# Patient Record
Sex: FEMALE | Race: WHITE | NOT HISPANIC OR LATINO | Employment: OTHER
[De-identification: names, ages, dates, MRNs, and addresses within clinical notes are randomized per-mention and may not be internally consistent; named-entity substitution may affect disease eponyms.]

---

## 2017-02-28 DIAGNOSIS — I10 ESSENTIAL HYPERTENSION WITH GOAL BLOOD PRESSURE LESS THAN 140/90: ICD-10-CM

## 2017-02-28 DIAGNOSIS — N18.30 CKD (CHRONIC KIDNEY DISEASE) STAGE 3, GFR 30-59 ML/MIN (H): ICD-10-CM

## 2017-03-01 RX ORDER — LISINOPRIL 10 MG/1
TABLET ORAL
Qty: 90 TABLET | Refills: 1 | OUTPATIENT
Start: 2017-03-01

## 2017-03-01 NOTE — TELEPHONE ENCOUNTER
PATIENT HAS REFILLS ON FILE - PLEASE CONTACT PHARMACY AND CANCEL REQUEST    lisinopril (PRINIVIL,ZESTRIL) 10 MG tablet      Last Written Prescription Date: 11/15/2016  Last Fill Quantity: 180, # refills: 3  Last Office Visit with G, P or [facility] prescribing provider: 11/15/2016       Potassium   Date Value Ref Range Status   11/15/2016 3.8 3.4 - 5.3 mmol/L Final     Creatinine   Date Value Ref Range Status   11/15/2016 1.14 (H) 0.52 - 1.04 mg/dL Final     BP Readings from Last 3 Encounters:   11/15/16 138/82   11/13/15 (!) 140/93   03/10/15 146/84     BRO ZURITA RN

## 2017-05-27 ENCOUNTER — HEALTH MAINTENANCE LETTER (OUTPATIENT)
Age: 57
End: 2017-05-27

## 2017-10-25 ENCOUNTER — RADIANT APPOINTMENT (OUTPATIENT)
Dept: MAMMOGRAPHY | Facility: CLINIC | Age: 57
End: 2017-10-25
Attending: FAMILY MEDICINE
Payer: COMMERCIAL

## 2017-10-25 DIAGNOSIS — Z12.31 VISIT FOR SCREENING MAMMOGRAM: ICD-10-CM

## 2017-10-25 PROCEDURE — G0202 SCR MAMMO BI INCL CAD: HCPCS

## 2017-11-17 ENCOUNTER — OFFICE VISIT (OUTPATIENT)
Dept: FAMILY MEDICINE | Facility: CLINIC | Age: 57
End: 2017-11-17
Payer: COMMERCIAL

## 2017-11-17 VITALS
RESPIRATION RATE: 12 BRPM | HEART RATE: 70 BPM | BODY MASS INDEX: 28.64 KG/M2 | DIASTOLIC BLOOD PRESSURE: 82 MMHG | TEMPERATURE: 99 F | HEIGHT: 64 IN | SYSTOLIC BLOOD PRESSURE: 136 MMHG | OXYGEN SATURATION: 98 % | WEIGHT: 167.75 LBS

## 2017-11-17 DIAGNOSIS — Z83.719 FAMILY HISTORY OF COLONIC POLYPS: ICD-10-CM

## 2017-11-17 DIAGNOSIS — Z71.89 COUNSELING REGARDING ADVANCED DIRECTIVES: ICD-10-CM

## 2017-11-17 DIAGNOSIS — D12.6 TUBULAR ADENOMA OF COLON: ICD-10-CM

## 2017-11-17 DIAGNOSIS — R73.01 IMPAIRED FASTING GLUCOSE: ICD-10-CM

## 2017-11-17 DIAGNOSIS — I10 ESSENTIAL HYPERTENSION WITH GOAL BLOOD PRESSURE LESS THAN 140/90: ICD-10-CM

## 2017-11-17 DIAGNOSIS — N20.0 CALCULUS OF KIDNEY: ICD-10-CM

## 2017-11-17 DIAGNOSIS — Z11.59 NEED FOR HEPATITIS C SCREENING TEST: ICD-10-CM

## 2017-11-17 DIAGNOSIS — N18.30 CKD (CHRONIC KIDNEY DISEASE) STAGE 3, GFR 30-59 ML/MIN (H): ICD-10-CM

## 2017-11-17 DIAGNOSIS — Z01.419 WELL WOMAN EXAM WITH ROUTINE GYNECOLOGICAL EXAM: Primary | ICD-10-CM

## 2017-11-17 DIAGNOSIS — F33.8 SEASONAL AFFECTIVE DISORDER (H): ICD-10-CM

## 2017-11-17 DIAGNOSIS — Z13.6 ENCOUNTER FOR SCREENING FOR CARDIOVASCULAR DISORDERS: ICD-10-CM

## 2017-11-17 LAB
BASOPHILS # BLD AUTO: 0 10E9/L (ref 0–0.2)
BASOPHILS NFR BLD AUTO: 0.5 %
DIFFERENTIAL METHOD BLD: NORMAL
EOSINOPHIL # BLD AUTO: 0.1 10E9/L (ref 0–0.7)
EOSINOPHIL NFR BLD AUTO: 1.4 %
ERYTHROCYTE [DISTWIDTH] IN BLOOD BY AUTOMATED COUNT: 12.5 % (ref 10–15)
HBA1C MFR BLD: 5.6 % (ref 4.3–6)
HCT VFR BLD AUTO: 43.7 % (ref 35–47)
HCV AB SERPL QL IA: NONREACTIVE
HGB BLD-MCNC: 15 G/DL (ref 11.7–15.7)
LYMPHOCYTES # BLD AUTO: 1.3 10E9/L (ref 0.8–5.3)
LYMPHOCYTES NFR BLD AUTO: 30.6 %
MCH RBC QN AUTO: 31.8 PG (ref 26.5–33)
MCHC RBC AUTO-ENTMCNC: 34.3 G/DL (ref 31.5–36.5)
MCV RBC AUTO: 93 FL (ref 78–100)
MONOCYTES # BLD AUTO: 0.3 10E9/L (ref 0–1.3)
MONOCYTES NFR BLD AUTO: 7.7 %
NEUTROPHILS # BLD AUTO: 2.6 10E9/L (ref 1.6–8.3)
NEUTROPHILS NFR BLD AUTO: 59.8 %
PLATELET # BLD AUTO: 227 10E9/L (ref 150–450)
RBC # BLD AUTO: 4.72 10E12/L (ref 3.8–5.2)
WBC # BLD AUTO: 4.3 10E9/L (ref 4–11)

## 2017-11-17 PROCEDURE — G0145 SCR C/V CYTO,THINLAYER,RESCR: HCPCS | Performed by: FAMILY MEDICINE

## 2017-11-17 PROCEDURE — 36415 COLL VENOUS BLD VENIPUNCTURE: CPT | Performed by: FAMILY MEDICINE

## 2017-11-17 PROCEDURE — 87624 HPV HI-RISK TYP POOLED RSLT: CPT | Performed by: FAMILY MEDICINE

## 2017-11-17 PROCEDURE — 80050 GENERAL HEALTH PANEL: CPT | Performed by: FAMILY MEDICINE

## 2017-11-17 PROCEDURE — 86803 HEPATITIS C AB TEST: CPT | Performed by: FAMILY MEDICINE

## 2017-11-17 PROCEDURE — 80061 LIPID PANEL: CPT | Performed by: FAMILY MEDICINE

## 2017-11-17 PROCEDURE — 83036 HEMOGLOBIN GLYCOSYLATED A1C: CPT | Performed by: FAMILY MEDICINE

## 2017-11-17 PROCEDURE — 82043 UR ALBUMIN QUANTITATIVE: CPT | Performed by: FAMILY MEDICINE

## 2017-11-17 PROCEDURE — 99396 PREV VISIT EST AGE 40-64: CPT | Performed by: FAMILY MEDICINE

## 2017-11-17 RX ORDER — LISINOPRIL 10 MG/1
10 TABLET ORAL 2 TIMES DAILY
Qty: 180 TABLET | Refills: 3 | Status: SHIPPED | OUTPATIENT
Start: 2017-11-17 | End: 2018-11-26

## 2017-11-17 NOTE — LETTER
November 20, 2017      Franchesca Hyatt  [address]  [facility] [address]        Dear ,    We are writing to inform you of your test results.    Results are as follows   -Liver tests (ALT,AST, Alk phos) are normal but gall bladder tests ( total bilirubin ) is  elevated. ADVISE: recheck bilirubin non fasting to assess this further. Can make a lab only apt for this. further recommendations will follow based on this result. I will leave the order in the computer   -Kidney function (GFR) is decreased.  ADVISE: stable to prior  -Sodium is normal.  -Potassium is normal.  -Glucose is slight elevated and may be sign of early diabetes (prediabetes). ADVISE:: low carbohydrate diet, exercise, try to lose weight (if necessary) and recheck glucose in 12 months. (GLU,A1C, DX: prediabetes)  -LDL(bad) cholesterol level is elevated,  A diet high in fat and simple carbohydrates, genetics and being overweight can contribute to this. ADVISE: Exercise, a low fat, low carbohydrate diet and weight control are helpful to improve this.  Rechecking your fasting cholesterol panel in 12 months is recommended (Lipid w/ LDL reflex, DX:hyperlipidemia)  The 10-year ASCVD risk score (Rush DC Jr, et al., 2013) is: 3.4%    Values used to calculate the score:      Age: 56 years      Sex: Female      Is Non- : No      Diabetic: No      Tobacco smoker: No      Systolic Blood Pressure: 136 mmHg      Is BP treated: Yes      HDL Cholesterol: 53 mg/dL      Total Cholesterol: 219 mg/dL  -TSH (thyroid stimulating hormone) level is normal which indicates normal thyroid function..    Resulted Orders   Albumin Random Urine Quantitative with Creat Ratio   Result Value Ref Range    Creatinine Urine 133 mg/dL    Albumin Urine mg/L 27 mg/L    Albumin Urine mg/g Cr 20.08 0 - 25 mg/g Cr   Hemoglobin A1c   Result Value Ref Range    Hemoglobin A1C 5.6 4.3 - 6.0 %   CBC with platelets differential   Result Value Ref Range    WBC  4.3 4.0 - 11.0 10e9/L    RBC Count 4.72 3.8 - 5.2 10e12/L    Hemoglobin 15.0 11.7 - 15.7 g/dL    Hematocrit 43.7 35.0 - 47.0 %    MCV 93 78 - 100 fl    MCH 31.8 26.5 - 33.0 pg    MCHC 34.3 31.5 - 36.5 g/dL    RDW 12.5 10.0 - 15.0 %    Platelet Count 227 150 - 450 10e9/L    Diff Method Automated Method     % Neutrophils 59.8 %    % Lymphocytes 30.6 %    % Monocytes 7.7 %    % Eosinophils 1.4 %    % Basophils 0.5 %    Absolute Neutrophil 2.6 1.6 - 8.3 10e9/L    Absolute Lymphocytes 1.3 0.8 - 5.3 10e9/L    Absolute Monocytes 0.3 0.0 - 1.3 10e9/L    Absolute Eosinophils 0.1 0.0 - 0.7 10e9/L    Absolute Basophils 0.0 0.0 - 0.2 10e9/L   Comprehensive metabolic panel   Result Value Ref Range    Sodium 139 133 - 144 mmol/L    Potassium 3.9 3.4 - 5.3 mmol/L    Chloride 106 94 - 109 mmol/L    Carbon Dioxide 22 20 - 32 mmol/L    Anion Gap 11 3 - 14 mmol/L    Glucose 106 (H) 70 - 99 mg/dL      Comment:      Fasting specimen    Urea Nitrogen 22 7 - 30 mg/dL    Creatinine 1.11 (H) 0.52 - 1.04 mg/dL    GFR Estimate 51 (L) >60 mL/min/1.7m2      Comment:      Non  GFR Calc    GFR Estimate If Black 62 >60 mL/min/1.7m2      Comment:       GFR Calc    Calcium 9.2 8.5 - 10.1 mg/dL    Bilirubin Total 1.9 (H) 0.2 - 1.3 mg/dL    Albumin 4.4 3.4 - 5.0 g/dL    Protein Total 7.6 6.8 - 8.8 g/dL    Alkaline Phosphatase 50 40 - 150 U/L    ALT 24 0 - 50 U/L    AST 16 0 - 45 U/L   Lipid panel reflex to direct LDL Fasting   Result Value Ref Range    Cholesterol 219 (H) <200 mg/dL      Comment:      Desirable:       <200 mg/dl    Triglycerides 84 <150 mg/dL      Comment:      Fasting specimen    HDL Cholesterol 53 >49 mg/dL    LDL Cholesterol Calculated 149 (H) <100 mg/dL      Comment:      Above desirable:  100-129 mg/dl  Borderline High:  130-159 mg/dL  High:             160-189 mg/dL  Very high:       >189 mg/dl      Non HDL Cholesterol 166 (H) <130 mg/dL      Comment:      Above Desirable:  130-159  mg/dl  Borderline high:  160-189 mg/dl  High:             190-219 mg/dl  Very high:       >219 mg/dl     TSH with free T4 reflex   Result Value Ref Range    TSH 1.91 0.40 - 4.00 mU/L   Hepatitis C Screen Reflex to HCV RNA Quant and Genotype   Result Value Ref Range    Hepatitis C Antibody Nonreactive NR^Nonreactive      Comment:      Assay performance characteristics have not been established for newborns,   infants, and children         If you have any questions or concerns, please call the clinic at the number listed above.       Sincerely,        Deidre Mcqueen MD/nr

## 2017-11-17 NOTE — LETTER
December 1, 2017    Franchesca Hyatt  [address]  [facility] [address]    Dear Franchesca,  We are happy to inform you that your PAP smear result from 11/17/17 is normal.  We are now able to do a follow up test on PAP smears. The DNA test is for HPV (Human Papilloma Virus). Cervical cancer is closely linked with certain types of HPV. Your result showed no evidence of high risk HPV.  Therefore we recommend you return in 5 years for your next pap smear and HPV test.  You will still need to return to the clinic every year for an annual exam and other preventive tests.  Please contact the clinic at [phone] with any questions.  Sincerely,    Deidre Mcqueen MD/zara

## 2017-11-17 NOTE — LETTER
November 17, 2017      Franchesca BOO Edmar  [address]  [facility] [address]        Dear ,    We are writing to inform you of your test results.    Results within acceptable limits.  -Normal red blood cell (hgb) levels, normal white blood cell count and normal platelet levels.  -A1C (diabetic test) is normal and indicates that your blood sugar has been in a normal range the last 3 months.  Rest of tests not back yet    Resulted Orders   Hemoglobin A1c   Result Value Ref Range    Hemoglobin A1C 5.6 4.3 - 6.0 %   CBC with platelets differential   Result Value Ref Range    WBC 4.3 4.0 - 11.0 10e9/L    RBC Count 4.72 3.8 - 5.2 10e12/L    Hemoglobin 15.0 11.7 - 15.7 g/dL    Hematocrit 43.7 35.0 - 47.0 %    MCV 93 78 - 100 fl    MCH 31.8 26.5 - 33.0 pg    MCHC 34.3 31.5 - 36.5 g/dL    RDW 12.5 10.0 - 15.0 %    Platelet Count 227 150 - 450 10e9/L    Diff Method Automated Method     % Neutrophils 59.8 %    % Lymphocytes 30.6 %    % Monocytes 7.7 %    % Eosinophils 1.4 %    % Basophils 0.5 %    Absolute Neutrophil 2.6 1.6 - 8.3 10e9/L    Absolute Lymphocytes 1.3 0.8 - 5.3 10e9/L    Absolute Monocytes 0.3 0.0 - 1.3 10e9/L    Absolute Eosinophils 0.1 0.0 - 0.7 10e9/L    Absolute Basophils 0.0 0.0 - 0.2 10e9/L       If you have any questions or concerns, please call the clinic at the number listed above.       Sincerely,        Deidre Mcqueen MD/nr

## 2017-11-17 NOTE — PROGRESS NOTES
Results within acceptable limits.  -Normal red blood cell (hgb) levels, normal white blood cell count and normal platelet levels.  -A1C (diabetic test) is normal and indicates that your blood sugar has been in a normal range the last 3 months.  Rest of tests not back yet

## 2017-11-17 NOTE — MR AVS SNAPSHOT
After Visit Summary   11/17/2017    Franchesca Hyatt    MRN: [id number]           Patient Information     Date Of Birth          1960        Visit Information        Provider Department      11/17/2017 8:20 AM Deidre Mcqueen MD [facility] [place]        Today's Diagnoses     Well woman exam with routine gynecological exam    -  1    Essential hypertension with goal blood pressure less than 140/90        Impaired fasting glucose        CKD (chronic kidney disease) stage 3, GFR 30-59 ml/min        Calculus of kidney        Tubular adenoma of colon        Family history of colonic polyps        Counseling regarding advanced directives        Need for hepatitis C screening test        Encounter for screening for cardiovascular disorders        Seasonal affective disorder (H)          Care Instructions    Blood pressure   Breast exam monthly   Mammogram due every 2 yrs  Pelvic/pap/hpv done  Labs today   Work on advance directives  Recommend flu shot yearly   Colonoscopy due 2017 to 2019 given hx of tubular adenoma in 2014  Continue routine care with Primary Dr Lemus   Preventive Health Recommendations  Female Ages 50 - 64    Yearly exam: See your health care provider every year in order to  o Review health changes.   o Discuss preventive care.    o Review your medicines if your doctor has prescribed any.      Get a Pap test every three years (unless you have an abnormal result and your provider advises testing more often).    If you get Pap tests with HPV test, you only need to test every 5 years, unless you have an abnormal result.     You do not need a Pap test if your uterus was removed (hysterectomy) and you have not had cancer.    You should be tested each year for STDs (sexually transmitted diseases) if you're at risk.     Have a mammogram every 1 to 2 years.    Have a colonoscopy at age 50, or have a yearly FIT test (stool test). These exams screen for colon cancer.      Have a cholesterol  test every 5 years, or more often if advised.    Have a diabetes test (fasting glucose) every three years. If you are at risk for diabetes, you should have this test more often.     If you are at risk for osteoporosis (brittle bone disease), think about having a bone density scan (DEXA).    Shots: Get a flu shot each year. Get a tetanus shot every 10 years.    Nutrition:     Eat at least 5 servings of fruits and vegetables each day.    Eat whole-grain bread, whole-wheat pasta and brown rice instead of white grains and rice.    Talk to your provider about Calcium and Vitamin D.     Lifestyle    Exercise at least 150 minutes a week (30 minutes a day, 5 days a week). This will help you control your weight and prevent disease.    Limit alcohol to one drink per day.    No smoking.     Wear sunscreen to prevent skin cancer.     See your dentist every six months for an exam and cleaning.    See your eye doctor every 1 to 2 years.            Follow-ups after your visit        Who to contact     If you have questions or need follow up information about today's clinic visit or your schedule please contact [facility] directly at [phone].  Normal or non-critical lab and imaging results will be communicated to you by MyChart, letter or phone within 4 business days after the clinic has received the results. If you do not hear from us within 7 days, please contact the clinic through Margin[employer] or phone. If you have a critical or abnormal lab result, we will notify you by phone as soon as possible.  Submit refill requests through N[employer]al or call your pharmacy and they will forward the refill request to us. Please allow 3 business days for your refill to be completed.          Additional Information About Your Visit        My[employer] Information     Nautal lets you send messages to your doctor, view your test results, renew your prescriptions, schedule appointments and more. To sign up, go to [email or web address]  [email or web address] Click on \"Log in\" on the left side of the screen, which will take you to the Welcome page. Then click on \"Sign up Now\" on the right side of the page.     You will be asked to enter the access code listed below, as well as some personal information. Please follow the directions to create your username and password.     Your access code is: [email or web address]  Expires: 2/15/2018  9:06 AM     Your access code will  in 90 days. If you need help or a new code, please call your [place] clinic or [phone].        Care EveryWhere ID     This is your Care EveryWhere ID. This could be used by other organizations to access your [place] medical records  [id number]        Your Vitals Were     Pulse Temperature Respirations Height Pulse Oximetry BMI (Body Mass Index)    70 99  F (37.2  C) (Tympanic) 12 5' 4\" (1.626 m) 98% 28.79 kg/m2       Blood Pressure from Last 3 Encounters:   17 136/82   11/15/16 138/82   11/13/15 (!) 140/93    Weight from Last 3 Encounters:   17 167 lb 12 oz (76.1 kg)   11/15/16 171 lb (77.6 kg)   11/13/15 165 lb 5 oz (75 kg)              We Performed the Following     Albumin Random Urine Quantitative with Creat Ratio     CBC with platelets differential     Comprehensive metabolic panel     Hemoglobin A1c     Hepatitis C Screen Reflex to HCV RNA Quant and Genotype     HPV High Risk Types DNA Cervical     Lipid panel reflex to direct LDL Fasting     Pap imaged thin layer screen with HPV - recommended age 30 - 65 years (select HPV order below)     TSH with free T4 reflex          Today's Medication Changes          These changes are accurate as of: 17  9:06 AM.  If you have any questions, ask your nurse or doctor.               Start taking these medicines.        Dose/Directions    order for DME   Used for:  Seasonal affective disorder (H)   Started by:  Deidre Mcqueen MD        Equipment being ordered: SAD lite box   Quantity:  1 each   Refills:  0            Where to get your " medicines      These medications were sent to [facility] [place], [place] - [address]  [address], [facility] [address]     Phone:  [phone]     lisinopril 10 MG tablet         Some of these will need a paper prescription and others can be bought over the counter.  Ask your nurse if you have questions.     Bring a paper prescription for each of these medications     order for DME                Primary Care Provider Office Phone # Fax #    Monica Lemus [phone]       [address]  [facility] [address]        Equal Access to Services     [facility] : Hadii attila ku hadasho Soomaali, waaxda luqadaha, qaybta kaalmada adeegyada, estrella thomas . So [facility] [phone].    ATENCIÓN: Si habla español, tiene a delvlale disposición servicios gratuitos de asistencia lingüística. Llame al [phone].    We comply with applicable federal civil rights laws and [place] laws. We do not discriminate on the basis of race, color, national origin, age, disability, sex, sexual orientation, or gender identity.            Thank you!     Thank you for choosing [facility]  for your care. Our goal is always to provide you with excellent care. Hearing back from our patients is one way we can continue to improve our services. Please take a few minutes to complete the written survey that you may receive in the mail after your visit with us. Thank you!             Your Updated Medication List - Protect others around you: Learn how to safely use, store and throw away your medicines at www.disposemymeds.org.          This list is accurate as of: 11/17/17  9:06 AM.  Always use your most recent med list.                   Brand Name Dispense Instructions for use Diagnosis    ACETAMINOPHEN PO      Take 500 mg by mouth.        acidophilus Caps      2 caps. daily        calcipotriene 0.005 % cream    DOVONOX    60 g    Apply to affected area bid as  needed    Psoriasis       Digest Enzymes-Anticholinergic Caps      2 capsules daily        FISH OIL      None Entered        hydrOXYzine 25 MG tablet    ATARAX    30 tablet    Take 1/2-2 tabs po HS prn itching    Psoriasis       lisinopril 10 MG tablet    PRINIVIL/ZESTRIL    180 tablet    Take 1 tablet (10 mg) by mouth 2 times daily    Essential hypertension with goal blood pressure less than 140/90, CKD (chronic kidney disease) stage 3, GFR 30-59 ml/min       Magnesium 300 MG Caps      None Entered        MINERAL COMPLEX PO      None Entered        NETTLE LEAF PO           order for DME     1 each    Equipment being ordered: SAD lite box    Seasonal affective disorder (H)       VITAMIN D PO      1 cap daily in the winter

## 2017-11-17 NOTE — LETTER
November 20, 2017      Franchesca Hyatt  [address]  [facility] [address]        Dear ,    We are writing to inform you of your test results.    Results within acceptable limits.  -Microalbumin (urine protein) test is normal.  ADVISE: recheck annually.    Resulted Orders   Albumin Random Urine Quantitative with Creat Ratio   Result Value Ref Range    Creatinine Urine 133 mg/dL    Albumin Urine mg/L 27 mg/L    Albumin Urine mg/g Cr 20.08 0 - 25 mg/g Cr   Hemoglobin A1c   Result Value Ref Range    Hemoglobin A1C 5.6 4.3 - 6.0 %   CBC with platelets differential   Result Value Ref Range    WBC 4.3 4.0 - 11.0 10e9/L    RBC Count 4.72 3.8 - 5.2 10e12/L    Hemoglobin 15.0 11.7 - 15.7 g/dL    Hematocrit 43.7 35.0 - 47.0 %    MCV 93 78 - 100 fl    MCH 31.8 26.5 - 33.0 pg    MCHC 34.3 31.5 - 36.5 g/dL    RDW 12.5 10.0 - 15.0 %    Platelet Count 227 150 - 450 10e9/L    Diff Method Automated Method     % Neutrophils 59.8 %    % Lymphocytes 30.6 %    % Monocytes 7.7 %    % Eosinophils 1.4 %    % Basophils 0.5 %    Absolute Neutrophil 2.6 1.6 - 8.3 10e9/L    Absolute Lymphocytes 1.3 0.8 - 5.3 10e9/L    Absolute Monocytes 0.3 0.0 - 1.3 10e9/L    Absolute Eosinophils 0.1 0.0 - 0.7 10e9/L    Absolute Basophils 0.0 0.0 - 0.2 10e9/L   Comprehensive metabolic panel   Result Value Ref Range    Sodium 139 133 - 144 mmol/L    Potassium 3.9 3.4 - 5.3 mmol/L    Chloride 106 94 - 109 mmol/L    Carbon Dioxide 22 20 - 32 mmol/L    Anion Gap 11 3 - 14 mmol/L    Glucose 106 (H) 70 - 99 mg/dL      Comment:      Fasting specimen    Urea Nitrogen 22 7 - 30 mg/dL    Creatinine 1.11 (H) 0.52 - 1.04 mg/dL    GFR Estimate 51 (L) >60 mL/min/1.7m2      Comment:      Non  GFR Calc    GFR Estimate If Black 62 >60 mL/min/1.7m2      Comment:       GFR Calc    Calcium 9.2 8.5 - 10.1 mg/dL    Bilirubin Total 1.9 (H) 0.2 - 1.3 mg/dL    Albumin 4.4 3.4 - 5.0 g/dL    Protein Total 7.6 6.8 - 8.8 g/dL    Alkaline  Phosphatase 50 40 - 150 U/L    ALT 24 0 - 50 U/L    AST 16 0 - 45 U/L   Lipid panel reflex to direct LDL Fasting   Result Value Ref Range    Cholesterol 219 (H) <200 mg/dL      Comment:      Desirable:       <200 mg/dl    Triglycerides 84 <150 mg/dL      Comment:      Fasting specimen    HDL Cholesterol 53 >49 mg/dL    LDL Cholesterol Calculated 149 (H) <100 mg/dL      Comment:      Above desirable:  100-129 mg/dl  Borderline High:  130-159 mg/dL  High:             160-189 mg/dL  Very high:       >189 mg/dl      Non HDL Cholesterol 166 (H) <130 mg/dL      Comment:      Above Desirable:  130-159 mg/dl  Borderline high:  160-189 mg/dl  High:             190-219 mg/dl  Very high:       >219 mg/dl     TSH with free T4 reflex   Result Value Ref Range    TSH 1.91 0.40 - 4.00 mU/L   Hepatitis C Screen Reflex to HCV RNA Quant and Genotype   Result Value Ref Range    Hepatitis C Antibody Nonreactive NR^Nonreactive      Comment:      Assay performance characteristics have not been established for newborns,   infants, and children         If you have any questions or concerns, please call the clinic at the number listed above.       Sincerely,        Deidre Mcqueen MD/nr

## 2017-11-17 NOTE — LETTER
November 20, 2017      Franchesca Hyatt  [address]  [facility] [address]        Dear ,    We are writing to inform you of your test results.    Results within acceptable limits.  -Hepatitis C antibody screen test shows no signs of a previous hepatitis C infection..    Resulted Orders   Albumin Random Urine Quantitative with Creat Ratio   Result Value Ref Range    Creatinine Urine 133 mg/dL    Albumin Urine mg/L 27 mg/L    Albumin Urine mg/g Cr 20.08 0 - 25 mg/g Cr   Hemoglobin A1c   Result Value Ref Range    Hemoglobin A1C 5.6 4.3 - 6.0 %   CBC with platelets differential   Result Value Ref Range    WBC 4.3 4.0 - 11.0 10e9/L    RBC Count 4.72 3.8 - 5.2 10e12/L    Hemoglobin 15.0 11.7 - 15.7 g/dL    Hematocrit 43.7 35.0 - 47.0 %    MCV 93 78 - 100 fl    MCH 31.8 26.5 - 33.0 pg    MCHC 34.3 31.5 - 36.5 g/dL    RDW 12.5 10.0 - 15.0 %    Platelet Count 227 150 - 450 10e9/L    Diff Method Automated Method     % Neutrophils 59.8 %    % Lymphocytes 30.6 %    % Monocytes 7.7 %    % Eosinophils 1.4 %    % Basophils 0.5 %    Absolute Neutrophil 2.6 1.6 - 8.3 10e9/L    Absolute Lymphocytes 1.3 0.8 - 5.3 10e9/L    Absolute Monocytes 0.3 0.0 - 1.3 10e9/L    Absolute Eosinophils 0.1 0.0 - 0.7 10e9/L    Absolute Basophils 0.0 0.0 - 0.2 10e9/L   Comprehensive metabolic panel   Result Value Ref Range    Sodium 139 133 - 144 mmol/L    Potassium 3.9 3.4 - 5.3 mmol/L    Chloride 106 94 - 109 mmol/L    Carbon Dioxide 22 20 - 32 mmol/L    Anion Gap 11 3 - 14 mmol/L    Glucose 106 (H) 70 - 99 mg/dL      Comment:      Fasting specimen    Urea Nitrogen 22 7 - 30 mg/dL    Creatinine 1.11 (H) 0.52 - 1.04 mg/dL    GFR Estimate 51 (L) >60 mL/min/1.7m2      Comment:      Non  GFR Calc    GFR Estimate If Black 62 >60 mL/min/1.7m2      Comment:       GFR Calc    Calcium 9.2 8.5 - 10.1 mg/dL    Bilirubin Total 1.9 (H) 0.2 - 1.3 mg/dL    Albumin 4.4 3.4 - 5.0 g/dL    Protein Total 7.6 6.8 - 8.8 g/dL     Alkaline Phosphatase 50 40 - 150 U/L    ALT 24 0 - 50 U/L    AST 16 0 - 45 U/L   Lipid panel reflex to direct LDL Fasting   Result Value Ref Range    Cholesterol 219 (H) <200 mg/dL      Comment:      Desirable:       <200 mg/dl    Triglycerides 84 <150 mg/dL      Comment:      Fasting specimen    HDL Cholesterol 53 >49 mg/dL    LDL Cholesterol Calculated 149 (H) <100 mg/dL      Comment:      Above desirable:  100-129 mg/dl  Borderline High:  130-159 mg/dL  High:             160-189 mg/dL  Very high:       >189 mg/dl      Non HDL Cholesterol 166 (H) <130 mg/dL      Comment:      Above Desirable:  130-159 mg/dl  Borderline high:  160-189 mg/dl  High:             190-219 mg/dl  Very high:       >219 mg/dl     TSH with free T4 reflex   Result Value Ref Range    TSH 1.91 0.40 - 4.00 mU/L   Hepatitis C Screen Reflex to HCV RNA Quant and Genotype   Result Value Ref Range    Hepatitis C Antibody Nonreactive NR^Nonreactive      Comment:      Assay performance characteristics have not been established for newborns,   infants, and children         If you have any questions or concerns, please call the clinic at the number listed above.       Sincerely,        Deidre Mcqueen MD/nr

## 2017-11-17 NOTE — PROGRESS NOTES
SUBJECTIVE:   CC: Franchesca Hyatt is an 56 year old woman who presents for preventive health visit.     Healthy Habits:  Answers for HPI/ROS submitted by the patient on 11/17/2017   Annual Exam:  Getting at least 3 servings of Calcium per day:: Yes  Bi-annual eye exam:: Yes  Dental care twice a year:: Yes  Sleep apnea or symptoms of sleep apnea:: None  Diet:: Carbohydrate counting, Gluten-free/reduced, Other  Frequency of exercise:: 4-5 days/week  Taking medications regularly:: Yes  Medication side effects:: None  Additional concerns today:: No  PHQ-2 Score: 1  Duration of exercise:: 15-30 minutes    Fasting hungry, fasting, takes lisinopril twice a day, BP at home   Came down in office in am , used to think white coat syndrome  Fasting brings it up as feeling shaky from being hungry.  Has had home BP machine checked before and reports accurate     BP at home 95 to 103 to 128 / 57 to 85   Twice 143-146 / 89- 96   Has had brought machine in   Home machine at home read higher today for some reason    Yoga    Seasonal allergies to rag wed and a cold in sept , usual 1 to 2 a yr. Has a bit of throat clearing.    Heart burn depending on what she eats.     Prefers to do as much holistically as possible    mammogram 10/2017 normal    Derm skin check normal    Has few hydroxyzine in past itchy phase of menopause, not taking much kolb snot need refill currently    mood always Down in nov. Has SAD     Not sexually active. LMp 2014     Declines flu shot    Hypertension Follow-up      Outpatient blood pressures are being checked at home.  Results are as above.    Low Salt Diet: no added salt    Today's PHQ-2 Score: PHQ-2 ( 1999 Pfizer) 11/17/2017 11/17/2017   Q1: Little interest or pleasure in doing things 1 0   Q2: Feeling down, depressed or hopeless 1 1   PHQ-2 Score 2 1   Q1: Little interest or pleasure in doing things Not at all -   Q2: Feeling down, depressed or hopeless Several days -   PHQ-2 Score 1 -     Abuse: Current  or Past(Physical, Sexual or Emotional)- No  Do you feel safe in your environment - Yes    Social History   Substance Use Topics     Smoking status: Never Smoker     Smokeless tobacco: Never Used     Alcohol use Yes      Comment: rare - one glass per week of wine     The patient does not drink >3 drinks per day nor >7 drinks per week.    Reviewed orders with patient.  Reviewed health maintenance and updated orders accordingly - Yes  Labs reviewed in EPIC  BP Readings from Last 3 Encounters:   11/17/17 136/82   11/15/16 138/82   11/13/15 (!) 140/93    Wt Readings from Last 3 Encounters:   11/17/17 167 lb 12 oz (76.1 kg)   11/15/16 171 lb (77.6 kg)   11/13/15 165 lb 5 oz (75 kg)                  Patient Active Problem List   Diagnosis     Family history of colonic polyps     Calculus of kidney     CKD (chronic kidney disease) stage 3, GFR 30-59 ml/min     Perimenopause     Tubular adenoma of colon     White coat hypertension     Impaired fasting glucose     Essential hypertension with goal blood pressure less than 140/90     Past Surgical History:   Procedure Laterality Date     BIOPSY BREAST      X 2     COLONOSCOPY  6/15/2009    Polyp - Due in 5 years     CYSTOSCOPY, RETROGRADES, MANIPULATE STONE, INSERT STENT, COMBINED  2007     EXCISE PILONIDAL CYST, EXTENSIVE       LASER REVOLIX LITHOTRIPSY URETER(S), INSERT STENT, COMBINED  2007     Sebaceous Cyst Removal  2008    Back       Social History   Substance Use Topics     Smoking status: Never Smoker     Smokeless tobacco: Never Used     Alcohol use Yes      Comment: rare - one glass per week of wine     Family History   Problem Relation Age of Onset     Hypertension Mother      CEREBROVASCULAR DISEASE Mother      at age 50     Allergies Mother      Arthritis Mother      osteo     Depression Mother      Cancer - colorectal Mother      BENIGN polyps 68 (2007)     Hypertension Father      Allergies Father      Depression Father      CEREBROVASCULAR DISEASE Father       HEART DISEASE Father      had heart surgery and a stroke      Cancer - colorectal Maternal Grandmother      at age 50     Alzheimer Disease Maternal Grandmother      diag at age 80     Eye Disorder Maternal Grandmother      cateracts     OSTEOPOROSIS Maternal Grandmother      CANCER Maternal Grandfather      smoker     CANCER Paternal Grandfather      smoker     Hypertension Brother      Depression Sister      Gynecology Sister      cervical cancer at age 30     CANCER Sister      cervical         Current Outpatient Prescriptions   Medication Sig Dispense Refill     lisinopril (PRINIVIL/ZESTRIL) 10 MG tablet Take 1 tablet (10 mg) by mouth 2 times daily 180 tablet 3     order for DME Equipment being ordered: SAD lite box 1 each 0     hydrOXYzine (ATARAX) 25 MG tablet Take 1/2-2 tabs po HS prn itching 30 tablet 6     Nettle, Urtica Dioica, (NETTLE LEAF PO)        calcipotriene (DOVONOX) 0.005 % cream Apply to affected area bid as needed 60 g 3     ACETAMINOPHEN PO Take 500 mg by mouth.       VITAMIN D OR 1 cap daily in the winter       FISH OIL None Entered       MAGNESIUM 300 MG OR CAPS None Entered       MINERAL COMPLEX OR None Entered       DIGEST ENZYMES-ANTICHOLINERGIC OR CAPS 2 capsules daily       ACIDOPHILUS OR CAPS 2 caps. daily  0     Allergies   Allergen Reactions     Seasonal Allergies      Vicodin [Hydrocodone-Acetaminophen]      Recent Labs   Lab Test  11/17/17   0907  11/15/16   0853  11/13/15   0854  11/07/14   0903  11/06/13   1001   03/28/12   1805  11/02/11   0931   10/29/10   1021   A1C  5.6  5.6   --    --    --    --    --    --    --    --    LDL   --   136*  156*  123  138*   < >   --   136*   --   121   HDL   --   46*  58  49*  52   < >   --   57   --   46*   TRIG   --   84  112  162*  99   < >   --   69   --   71   ALT   --    --    --    --   29   --   12   --    --   21   CR   --   1.14*  1.03  1.10*  1.03   < >  1.10*   --    < >  1.08*   GFRESTIMATED   --   49*  56*  52*  56*   < >  53*    --    < >  54*   GFRESTBLACK   --   60*  68  63  68   < >  64   --    < >  66   POTASSIUM   --   3.8  3.8  3.8  4.1   < >  3.5   --    < >  4.3   TSH   --    --    --    --    --    --    --   1.11   --    --     < > = values in this interval not displayed.              Patient over age 50, mutual decision to screen reflected in health maintenance.      Pertinent mammograms are reviewed under the imaging tab.  History of abnormal Pap smear:   NO - age 30-65 PAP every 5 years with negative HPV co-testing recommended  Last 3 Pap Results:   PAP (no units)   Date Value   2013 NIL   2012 ASC-US (A)   10/28/2009 NIL       Reviewed and updated as needed this visit by clinical staffTobacco  Allergies  Meds  Med Hx  Surg Hx  Fam Hx  Soc Hx        Reviewed and updated as needed this visit by Provider        Past Medical History:   Diagnosis Date     Calculus of kidney      CKD (chronic kidney disease) stage 3, GFR 30-59 ml/min      Hypertension goal BP (blood pressure) < 140/90 2012     Seasonal allergic rhinitis     seasonal allergies, cats     Tubular adenoma of colon 10/7/2014    Colonoscopy 10/1/2014, repeat 2019     Urinary tract infection, site not specified  to early     Past history of       Past Surgical History:   Procedure Laterality Date     BIOPSY BREAST      X 2     COLONOSCOPY  6/15/2009    Polyp - Due in 5 years     CYSTOSCOPY, RETROGRADES, MANIPULATE STONE, INSERT STENT, COMBINED       EXCISE PILONIDAL CYST, EXTENSIVE       LASER REVOLIX LITHOTRIPSY URETER(S), INSERT STENT, COMBINED       Sebaceous Cyst Removal      Back     Obstetric History       T0      L0     SAB0   TAB4   Ectopic0   Multiple0   Live Births0       # Outcome Date GA Lbr Jeffery/2nd Weight Sex Delivery Anes PTL Lv   4 TAB            3 TAB            2 TAB            1 TAB                   ROS:  C: NEGATIVE for fever, chills, change in weight  I: NEGATIVE for worrisome rashes, moles or  "lesions  E: NEGATIVE for vision changes or irritation  ENT: NEGATIVE for ear, mouth and throat problems  R: NEGATIVE for significant cough or SOB  B: NEGATIVE for masses, tenderness or discharge  CV: NEGATIVE for chest pain, palpitations or peripheral edema  GI: NEGATIVE for nausea, abdominal pain, heartburn, or change in bowel habits  : NEGATIVE for unusual urinary or vaginal symptoms. No vaginal bleeding.  M: NEGATIVE for significant arthralgias or myalgia  N: NEGATIVE for weakness, dizziness or paresthesias  E: NEGATIVE for temperature intolerance, skin/hair changes  H: NEGATIVE for bleeding problems  P: NEGATIVE for changes in mood or affect     OBJECTIVE:   /82 (BP Location: Right arm, Patient Position: Chair, Cuff Size: Adult Regular)  Pulse 70  Temp 99  F (37.2  C) (Tympanic)  Resp 12  Ht 5' 4\" (1.626 m)  Wt 167 lb 12 oz (76.1 kg)  SpO2 98%  BMI 28.79 kg/m2  EXAM:  GENERAL: healthy, alert and no distress  EYES: Eyes grossly normal to inspection, PERRL and conjunctivae and sclerae normal  HENT: ear canals and TM's normal, nose and mouth without ulcers or lesions  NECK: no adenopathy, no asymmetry, masses, or scars and thyroid normal to palpation  RESP: lungs clear to auscultation - no rales, rhonchi or wheezes  BREAST: normal without masses, tenderness or nipple discharge and no palpable axillary masses or adenopathy  CV: regular rate and rhythm, normal S1 S2, no S3 or S4, no murmur, click or rub, no peripheral edema and peripheral pulses strong  ABDOMEN: soft, non tender, no hepatosplenomegaly, no masses and bowel sounds normal   (female): normal female external genitalia, normal urethral meatus , vaginal mucosa pink, moist, well rugated, vaginal mucosal atrophy and normal cervix, adnexae, and uterus without masses. PAP & HPV done  MS: no gross musculoskeletal defects noted, no edema  SKIN: no suspicious lesions or rashes  NEURO: Normal strength and tone, mentation intact and speech " normal  PSYCH: mentation appears normal, affect normal/bright  LYMPH: no cervical, supraclavicular, axillary, or inguinal adenopathy    ASSESSMENT/PLAN:   1. Well woman exam with routine gynecological exam   in 2015 stressful past few years, also income has dropped, and she's self-employed, Raising step son (has since birth), Will only come in once a year; current health insurance very high deductible, office visits $200 +, Uses complementary medication to treat any illness. Reports normal home BP 96/71 to 126/82, hx of HTN on Lisinopril 10 mg twice a day, white coat hypertension, impaired glucose, CKD 3, hx of tubular adenoma noted in 2014 colonoscopy, FH of colonic polyps, hx of kidney stones , prior lithotripsy , cystoscopy and stents, seasonal allergic rhinitis, prior UTI S, prior breast biopsy, sebaceous cyst removed from back in 08 and prior pilonidal cyst removal, has albuterol prn not sure why, no asthma or COPD hx on chart review,  hx allergic to Vicodin, seen by Dr Lemus PCP 1 yr ago. Noted then BP very elevated when initially roomed (189/100), patient very stressed about heart surgery of her father that day, family stress, recent election, cost of health care for her, normalized when rechecked. PCP thought since significant stress from financial cost of visits, reasonable to have once annual visits for her. [place]  negative. Here for well woman physical  Blood pressure looks ok. Breast exam monthly. Mammogram due every 2 yrs. Pelvic/pap/HPV done. Labs today. Work on advance directives. Recommend flu shot yearly. Colonoscopy due 2017 to 2019 given hx of tubular adenoma in 2014. Plans to do 2019 per her conversation with GI who did colonoscopy. Continue routine care with Primary Dr Lemus   - Lipid panel reflex to direct LDL Fasting  - Hepatitis C Screen Reflex to HCV RNA Quant and Genotype  - Pap imaged thin layer screen with HPV - recommended age 30 - 65 years (select HPV order below)  - HPV High  "Risk Types DNA Cervical    2. Essential hypertension with goal blood pressure less than 140/90  - Albumin Random Urine Quantitative with Creat Ratio  - TSH with free T4 reflex  - lisinopril (PRINIVIL/ZESTRIL) 10 MG tablet; Take 1 tablet (10 mg) by mouth 2 times daily  Dispense: 180 tablet; Refill: 3    3. Impaired fasting glucose  - Hemoglobin A1c  - Comprehensive metabolic panel  - TSH with free T4 reflex    4. CKD (chronic kidney disease) stage 3, GFR 30-59 ml/min  - CBC with platelets differential  - Comprehensive metabolic panel  - lisinopril (PRINIVIL/ZESTRIL) 10 MG tablet; Take 1 tablet (10 mg) by mouth 2 times daily  Dispense: 180 tablet; Refill: 3    5. Calculus of kidney  asymptomatic    6. Tubular adenoma of colon  Colonoscopy 2019    7. Family history of colonic polyps  Colonoscopy as noted    8. Counseling regarding advanced directives  Honoring choices form given    9. Need for hepatitis C screening test  - Hepatitis C Screen Reflex to HCV RNA Quant and Genotype    10. Encounter for screening for cardiovascular disorders  - Lipid panel reflex to direct LDL Fasting    11. Seasonal affective disorder (H)  - order for DME; Equipment being ordered: SAD lite box  Dispense: 1 each; Refill: 0    COUNSELING:   Reviewed preventive health counseling, as reflected in patient instructions       Regular exercise       Healthy diet/nutrition       Vision screening       Immunizations    Declined: Influenza              Colon cancer screening           reports that she has never smoked. She has never used smokeless tobacco.    Estimated body mass index is 28.79 kg/(m^2) as calculated from the following:    Height as of this encounter: 5' 4\" (1.626 m).    Weight as of this encounter: 167 lb 12 oz (76.1 kg).   Weight management plan: Discussed healthy diet and exercise guidelines and patient will follow up in 12 months in clinic to re-evaluate.    Counseling Resources:  ATP IV Guidelines  Pooled Cohorts Equation " Calculator  Breast Cancer Risk Calculator  FRAX Risk Assessment  ICSI Preventive Guidelines  Dietary Guidelines for Americans, 2010  [employer]'s MyPlate  ASA Prophylaxis  Lung CA Screening    Deidre Mcqueen MD  [facility]

## 2017-11-17 NOTE — PATIENT INSTRUCTIONS
Blood pressure   Breast exam monthly   Mammogram due every 2 yrs  Pelvic/pap/hpv done  Labs today   Work on advance directives  Recommend flu shot yearly   Colonoscopy due 2017 to 2019 given hx of tubular adenoma in 2014  Continue routine care with Primary Dr Lemus   Preventive Health Recommendations  Female Ages 50 - 64    Yearly exam: See your health care provider every year in order to  o Review health changes.   o Discuss preventive care.    o Review your medicines if your doctor has prescribed any.      Get a Pap test every three years (unless you have an abnormal result and your provider advises testing more often).    If you get Pap tests with HPV test, you only need to test every 5 years, unless you have an abnormal result.     You do not need a Pap test if your uterus was removed (hysterectomy) and you have not had cancer.    You should be tested each year for STDs (sexually transmitted diseases) if you're at risk.     Have a mammogram every 1 to 2 years.    Have a colonoscopy at age 50, or have a yearly FIT test (stool test). These exams screen for colon cancer.      Have a cholesterol test every 5 years, or more often if advised.    Have a diabetes test (fasting glucose) every three years. If you are at risk for diabetes, you should have this test more often.     If you are at risk for osteoporosis (brittle bone disease), think about having a bone density scan (DEXA).    Shots: Get a flu shot each year. Get a tetanus shot every 10 years.    Nutrition:     Eat at least 5 servings of fruits and vegetables each day.    Eat whole-grain bread, whole-wheat pasta and brown rice instead of white grains and rice.    Talk to your provider about Calcium and Vitamin D.     Lifestyle    Exercise at least 150 minutes a week (30 minutes a day, 5 days a week). This will help you control your weight and prevent disease.    Limit alcohol to one drink per day.    No smoking.     Wear sunscreen to prevent skin cancer.      See your dentist every six months for an exam and cleaning.    See your eye doctor every 1 to 2 years.

## 2017-11-17 NOTE — NURSING NOTE
"Chief Complaint   Patient presents with     Physical       Initial /82 (BP Location: Right arm, Patient Position: Chair, Cuff Size: Adult Regular)  Pulse 70  Temp 99  F (37.2  C) (Tympanic)  Resp 12  Ht 5' 4\" (1.626 m)  Wt 167 lb 12 oz (76.1 kg)  SpO2 98%  BMI 28.79 kg/m2 Estimated body mass index is 28.79 kg/(m^2) as calculated from the following:    Height as of this encounter: 5' 4\" (1.626 m).    Weight as of this encounter: 167 lb 12 oz (76.1 kg).  Medication Reconciliation: complete Khalif Garcia MA      "

## 2017-11-18 LAB
ALBUMIN SERPL-MCNC: 4.4 G/DL (ref 3.4–5)
ALP SERPL-CCNC: 50 U/L (ref 40–150)
ALT SERPL W P-5'-P-CCNC: 24 U/L (ref 0–50)
ANION GAP SERPL CALCULATED.3IONS-SCNC: 11 MMOL/L (ref 3–14)
AST SERPL W P-5'-P-CCNC: 16 U/L (ref 0–45)
BILIRUB SERPL-MCNC: 1.9 MG/DL (ref 0.2–1.3)
BUN SERPL-MCNC: 22 MG/DL (ref 7–30)
CALCIUM SERPL-MCNC: 9.2 MG/DL (ref 8.5–10.1)
CHLORIDE SERPL-SCNC: 106 MMOL/L (ref 94–109)
CHOLEST SERPL-MCNC: 219 MG/DL
CO2 SERPL-SCNC: 22 MMOL/L (ref 20–32)
CREAT SERPL-MCNC: 1.11 MG/DL (ref 0.52–1.04)
GFR SERPL CREATININE-BSD FRML MDRD: 51 ML/MIN/1.7M2
GLUCOSE SERPL-MCNC: 106 MG/DL (ref 70–99)
HDLC SERPL-MCNC: 53 MG/DL
LDLC SERPL CALC-MCNC: 149 MG/DL
NONHDLC SERPL-MCNC: 166 MG/DL
POTASSIUM SERPL-SCNC: 3.9 MMOL/L (ref 3.4–5.3)
PROT SERPL-MCNC: 7.6 G/DL (ref 6.8–8.8)
SODIUM SERPL-SCNC: 139 MMOL/L (ref 133–144)
TRIGL SERPL-MCNC: 84 MG/DL
TSH SERPL DL<=0.005 MIU/L-ACNC: 1.91 MU/L (ref 0.4–4)

## 2017-11-18 NOTE — PROGRESS NOTES
Results within acceptable limits.  -Hepatitis C antibody screen test shows no signs of a previous hepatitis C infection..

## 2017-11-19 ENCOUNTER — TELEPHONE (OUTPATIENT)
Dept: FAMILY MEDICINE | Facility: CLINIC | Age: 57
End: 2017-11-19

## 2017-11-19 DIAGNOSIS — R17 TOTAL BILIRUBIN, ELEVATED: Primary | ICD-10-CM

## 2017-11-19 LAB
CREAT UR-MCNC: 133 MG/DL
MICROALBUMIN UR-MCNC: 27 MG/L
MICROALBUMIN/CREAT UR: 20.08 MG/G CR (ref 0–25)

## 2017-11-21 LAB
COPATH REPORT: NORMAL
PAP: NORMAL

## 2017-11-22 LAB
FINAL DIAGNOSIS: NORMAL
HPV HR 12 DNA CVX QL NAA+PROBE: NEGATIVE
HPV16 DNA SPEC QL NAA+PROBE: NEGATIVE
HPV18 DNA SPEC QL NAA+PROBE: NEGATIVE
SPECIMEN DESCRIPTION: NORMAL

## 2018-03-24 ENCOUNTER — OFFICE VISIT (OUTPATIENT)
Dept: URGENT CARE | Facility: URGENT CARE | Age: 58
End: 2018-03-24
Payer: COMMERCIAL

## 2018-03-24 VITALS
BODY MASS INDEX: 28.17 KG/M2 | OXYGEN SATURATION: 99 % | TEMPERATURE: 98.2 F | RESPIRATION RATE: 16 BRPM | WEIGHT: 165 LBS | SYSTOLIC BLOOD PRESSURE: 124 MMHG | HEART RATE: 75 BPM | HEIGHT: 64 IN | DIASTOLIC BLOOD PRESSURE: 80 MMHG

## 2018-03-24 DIAGNOSIS — R31.0 GROSS HEMATURIA: Primary | ICD-10-CM

## 2018-03-24 LAB
ALBUMIN UR-MCNC: 30 MG/DL
APPEARANCE UR: CLEAR
BILIRUB UR QL STRIP: NEGATIVE
COLOR UR AUTO: ABNORMAL
GLUCOSE UR STRIP-MCNC: NEGATIVE MG/DL
HGB UR QL STRIP: ABNORMAL
KETONES UR STRIP-MCNC: NEGATIVE MG/DL
LEUKOCYTE ESTERASE UR QL STRIP: NEGATIVE
NITRATE UR QL: NEGATIVE
PH UR STRIP: 5.5 PH (ref 5–7)
RBC #/AREA URNS AUTO: ABNORMAL /HPF
SOURCE: ABNORMAL
SP GR UR STRIP: <=1.005 (ref 1–1.03)
UROBILINOGEN UR STRIP-ACNC: 0.2 EU/DL (ref 0.2–1)
WBC #/AREA URNS AUTO: ABNORMAL /HPF

## 2018-03-24 PROCEDURE — 99213 OFFICE O/P EST LOW 20 MIN: CPT | Performed by: PHYSICIAN ASSISTANT

## 2018-03-24 PROCEDURE — 81001 URINALYSIS AUTO W/SCOPE: CPT | Performed by: PHYSICIAN ASSISTANT

## 2018-03-24 NOTE — MR AVS SNAPSHOT
"              After Visit Summary   3/24/2018    Franchesca Hyatt    MRN: [id number]           Patient Information     Date Of Birth          1960        Visit Information        Provider Department      3/24/2018 7:45 PM Tico Schwartz PA-C [facility]        Today's Diagnoses     Gross hematuria    -  1       Follow-ups after your visit        Your next 10 appointments already scheduled     Mar 26, 2018  8:00 AM CDT   SHORT with Marcos Mayen MD   [facility] ([facility])    [address] [place] [id number] [phone]              Who to contact     If you have questions or need follow up information about today's clinic visit or your schedule please contact [facility] directly at [phone].  Normal or non-critical lab and imaging results will be communicated to you by MyChart, letter or phone within 4 business days after the clinic has received the results. If you do not hear from us within 7 days, please contact the clinic through MyChart or phone. If you have a critical or abnormal lab result, we will notify you by phone as soon as possible.  Submit refill requests through [facility] or call your pharmacy and they will forward the refill request to us. Please allow 3 business days for your refill to be completed.          Additional Information About Your Visit        MyChart Information     [facility] lets you send messages to your doctor, view your test results, renew your prescriptions, schedule appointments and more. To sign up, go to [email or web address] . Click on \"Log in\" on the left side of the screen, which will take you to the Welcome page. Then click on \"Sign up Now\" on the right side of the page.     You will be asked to enter the access code listed below, as well as some personal information. Please follow the directions to create your username and password.     Your access code is: " "[email or web address]  Expires: 2018  8:25 PM     Your access code will  in 90 days. If you need help or a new code, please call your [facility] clinic or [phone].        Care EveryWhere ID     This is your Care EveryWhere ID. This could be used by other organizations to access your [facility] medical records  [id number]        Your Vitals Were     Pulse Temperature Respirations Height Pulse Oximetry Breastfeeding?    75 98.2  F (36.8  C) (Oral) 16 5' 4\" (1.626 m) 99% No    BMI (Body Mass Index)                   28.32 kg/m2            Blood Pressure from Last 3 Encounters:   18 124/80   17 136/82   11/15/16 138/82    Weight from Last 3 Encounters:   18 165 lb (74.8 kg)   17 167 lb 12 oz (76.1 kg)   11/15/16 171 lb (77.6 kg)              We Performed the Following     *UA reflex to Microscopic and Culture ([place] and [facility] (except [place] and [place])     Urine Microscopic        Primary Care Provider Office Phone # Fax #    Monica Lemus [phone] [address] [place] [id number]        Equal Access to Services     GAIL BRANDON : Hadii attila ku hadasho Soomaali, waaxda luqadaha, qaybta kaalmada adeegyada, waxay idiin hayreinan keyla mireles. So [facility] [phone].    ATENCIÓN: Si habla español, tiene a delvalle disposición servicios gratuitos de asistencia lingüística. Llame al [phone].    We comply with applicable federal civil rights laws and [place] laws. We do not discriminate on the basis of race, color, national origin, age, disability, sex, sexual orientation, or gender identity.            Thank you!     Thank you for choosing [facility]  for your care. Our goal is always to provide you with excellent care. Hearing back from our patients is one way we can continue to improve our services. Please take a few minutes to complete the written survey that you may receive in the mail after your visit with " us. Thank you!             Your Updated Medication List - Protect others around you: Learn how to safely use, store and throw away your medicines at www.disposemymeds.org.          This list is accurate as of 3/24/18  8:25 PM.  Always use your most recent med list.                   Brand Name Dispense Instructions for use Diagnosis    ACETAMINOPHEN PO      Take 500 mg by mouth.        acidophilus Caps      2 caps. daily        calcipotriene 0.005 % cream    DOVONOX    60 g    Apply to affected area bid as needed    Psoriasis       Digest Enzymes-Anticholinergic Caps      2 capsules daily        FISH OIL      None Entered        hydrOXYzine 25 MG tablet    ATARAX    30 tablet    Take 1/2-2 tabs po HS prn itching    Psoriasis       lisinopril 10 MG tablet    PRINIVIL/ZESTRIL    180 tablet    Take 1 tablet (10 mg) by mouth 2 times daily    Essential hypertension with goal blood pressure less than 140/90, CKD (chronic kidney disease) stage 3, GFR 30-59 ml/min       Magnesium 300 MG Caps      None Entered        MINERAL COMPLEX PO      None Entered        NETTLE LEAF PO           order for DME     1 each    Equipment being ordered: SAD lite box    Seasonal affective disorder (H)       VITAMIN D PO      1 cap daily in the winter

## 2018-03-25 NOTE — PROGRESS NOTES
"SUBJECTIVE:   Franchesca Hyatt is a 57 year old female with history of kidney stones and CKD stage 3 who presents today for a possible UTI. Symptoms of low back pain and blood in urine have been going on for 1 day(s).  Hematuria yes .  sudden onset and mild.  There is no history of fever, chills, nausea, or vomiting.  No history of vaginal  discharge. This patient does not have a history of urinary tract infections. Patient denies long duration, rigors, flank pain and temperature > 101 degrees F. or vaginal discharge, vaginal odor and vaginal itching. She denies abdominal pain. No fatigue or weight loss. She says \"this is not a kidney stone\".     Past Medical History:   Diagnosis Date     Calculus of kidney      CKD (chronic kidney disease) stage 3, GFR 30-59 ml/min      Hypertension goal BP (blood pressure) < 140/90 11/5/2012     Seasonal allergic rhinitis     seasonal allergies, cats     Tubular adenoma of colon 10/7/2014    Colonoscopy 10/1/2014, repeat 2019     Urinary tract infection, site not specified 1980 to early '90's    Past history of      Current Outpatient Prescriptions   Medication Sig Dispense Refill     lisinopril (PRINIVIL/ZESTRIL) 10 MG tablet Take 1 tablet (10 mg) by mouth 2 times daily 180 tablet 3     VITAMIN D OR 1 cap daily in the winter       FISH OIL None Entered       MAGNESIUM 300 MG OR CAPS None Entered       DIGEST ENZYMES-ANTICHOLINERGIC OR CAPS 2 capsules daily       ACIDOPHILUS OR CAPS 2 caps. daily  0     order for DME Equipment being ordered: SAD lite box (Patient not taking: Reported on 3/24/2018) 1 each 0     hydrOXYzine (ATARAX) 25 MG tablet Take 1/2-2 tabs po HS prn itching 30 tablet 6     Nettle, Urtica Dioica, (NETTLE LEAF PO)        calcipotriene (DOVONOX) 0.005 % cream Apply to affected area bid as needed 60 g 3     ACETAMINOPHEN PO Take 500 mg by mouth.       MINERAL COMPLEX OR None Entered       Social History   Substance Use Topics     Smoking status: Never Smoker     " "Smokeless tobacco: Never Used     Alcohol use Yes      Comment: rare - one glass per week of wine       ROS:   CONSTITUTIONAL:NEGATIVE for fever, chills, change in weight  : hematuria    OBJECTIVE:  /80  Pulse 75  Temp 98.2  F (36.8  C) (Oral)  Resp 16  Ht 5' 4\" (1.626 m)  Wt 165 lb (74.8 kg)  SpO2 99%  Breastfeeding? No  BMI 28.32 kg/m2  GENERAL APPEARANCE: healthy, alert and no distress  RESP: lungs clear to auscultation - no rales, rhonchi or wheezes  CV: regular rates and rhythm, normal S1 S2, no murmur noted  ABDOMEN:  soft, nontender, no HSM or masses and bowel sounds normal  BACK: No CVA tenderness  SKIN: no suspicious lesions or rashes    UA: blood with 25-50 RBC's microscopic.     ASSESSMENT:     1. Gross hematuria    - *UA reflex to Microscopic and Culture ([place] and [facility] (except [place] and [place])  - Urine Microscopic        PLAN:  As per ordered above  Drink plenty of fluids.  Prevention and treatment of UTI's discussed. Signs and symptoms of pyelonephritis mentioned.  Follow up with primary care physician next week if hematuria persists. If it resolves still follow up in 2 weeks for repeat UA.   "

## 2018-03-25 NOTE — NURSING NOTE
"Chief Complaint   Patient presents with     Urgent Care     UTI     concern of kidney or bladder infection. Has had some back issues and went to chiro. Has been eating a lot of beets so urine is red, Hasn't been eating beets since yesterday and urine is red again. Has compromized kidneys.        Initial /80  Pulse 75  Temp 98.2  F (36.8  C) (Oral)  Resp 16  Ht 5' 4\" (1.626 m)  Wt 165 lb (74.8 kg)  SpO2 99%  Breastfeeding? No  BMI 28.32 kg/m2 Estimated body mass index is 28.32 kg/(m^2) as calculated from the following:    Height as of this encounter: 5' 4\" (1.626 m).    Weight as of this encounter: 165 lb (74.8 kg).  Medication Reconciliation: complete  "

## 2018-04-02 ENCOUNTER — TELEPHONE (OUTPATIENT)
Dept: LAB | Facility: CLINIC | Age: 58
End: 2018-04-02

## 2018-11-20 ENCOUNTER — OFFICE VISIT (OUTPATIENT)
Dept: FAMILY MEDICINE | Facility: CLINIC | Age: 58
End: 2018-11-20
Payer: COMMERCIAL

## 2018-11-20 VITALS
RESPIRATION RATE: 16 BRPM | HEART RATE: 65 BPM | OXYGEN SATURATION: 98 % | DIASTOLIC BLOOD PRESSURE: 90 MMHG | WEIGHT: 183.31 LBS | SYSTOLIC BLOOD PRESSURE: 139 MMHG | BODY MASS INDEX: 31.47 KG/M2 | TEMPERATURE: 98.3 F

## 2018-11-20 DIAGNOSIS — F33.8 SEASONAL AFFECTIVE DISORDER (H): ICD-10-CM

## 2018-11-20 DIAGNOSIS — I10 ESSENTIAL HYPERTENSION WITH GOAL BLOOD PRESSURE LESS THAN 140/90: ICD-10-CM

## 2018-11-20 DIAGNOSIS — R63.5 ABNORMAL WEIGHT GAIN: ICD-10-CM

## 2018-11-20 DIAGNOSIS — E66.811 OBESITY, CLASS I, BMI 30-34.9: ICD-10-CM

## 2018-11-20 DIAGNOSIS — Z00.00 WELL WOMAN EXAM (NO GYNECOLOGICAL EXAM): Primary | ICD-10-CM

## 2018-11-20 DIAGNOSIS — D12.6 TUBULAR ADENOMA OF COLON: ICD-10-CM

## 2018-11-20 DIAGNOSIS — N18.30 CKD (CHRONIC KIDNEY DISEASE) STAGE 3, GFR 30-59 ML/MIN (H): ICD-10-CM

## 2018-11-20 DIAGNOSIS — R73.01 IMPAIRED FASTING GLUCOSE: ICD-10-CM

## 2018-11-20 DIAGNOSIS — R17 ELEVATED BILIRUBIN: ICD-10-CM

## 2018-11-20 LAB
BILIRUB DIRECT SERPL-MCNC: 0.2 MG/DL (ref 0–0.2)
BILIRUB SERPL-MCNC: 1.1 MG/DL (ref 0.2–1.3)
HBA1C MFR BLD: 5.5 % (ref 0–5.6)

## 2018-11-20 PROCEDURE — 84443 ASSAY THYROID STIM HORMONE: CPT | Performed by: FAMILY MEDICINE

## 2018-11-20 PROCEDURE — 80053 COMPREHEN METABOLIC PANEL: CPT | Performed by: FAMILY MEDICINE

## 2018-11-20 PROCEDURE — 80061 LIPID PANEL: CPT | Performed by: FAMILY MEDICINE

## 2018-11-20 PROCEDURE — 36415 COLL VENOUS BLD VENIPUNCTURE: CPT | Performed by: FAMILY MEDICINE

## 2018-11-20 PROCEDURE — 82248 BILIRUBIN DIRECT: CPT | Performed by: FAMILY MEDICINE

## 2018-11-20 PROCEDURE — 99396 PREV VISIT EST AGE 40-64: CPT | Performed by: FAMILY MEDICINE

## 2018-11-20 PROCEDURE — 82043 UR ALBUMIN QUANTITATIVE: CPT | Performed by: FAMILY MEDICINE

## 2018-11-20 PROCEDURE — 83036 HEMOGLOBIN GLYCOSYLATED A1C: CPT | Performed by: FAMILY MEDICINE

## 2018-11-20 ASSESSMENT — ENCOUNTER SYMPTOMS
DIZZINESS: 0
CHILLS: 0
PARESTHESIAS: 0
MYALGIAS: 0
DYSURIA: 0
ABDOMINAL PAIN: 0
SORE THROAT: 0
HEMATOCHEZIA: 0
JOINT SWELLING: 0
NAUSEA: 0
WEAKNESS: 0
PALPITATIONS: 0
FREQUENCY: 0
NERVOUS/ANXIOUS: 0
DIARRHEA: 0
COUGH: 0
EYE PAIN: 0
HEADACHES: 0
HEMATURIA: 0
SHORTNESS OF BREATH: 0
FEVER: 0
HEARTBURN: 0
ARTHRALGIAS: 0
CONSTIPATION: 0
BREAST MASS: 0

## 2018-11-20 NOTE — PROGRESS NOTES
"   SUBJECTIVE:   CC: Franchesca Hyatt is an 57 year old woman who presents for preventive health visit.     Physical   Annual:     Getting at least 3 servings of Calcium per day:  Yes    Bi-annual eye exam:  Yes    Dental care twice a year:  Yes    Sleep apnea or symptoms of sleep apnea:  None    Diet:  Carbohydrate counting, Gluten-free/reduced and Other    Frequency of exercise:  4-5 days/week    Duration of exercise:  15-30 minutes    Taking medications regularly:  Yes    Medication side effects:  None    Additional concerns today:  No      Wt Readings from Last 4 Encounters:   11/20/18 183 lb 5 oz (83.2 kg)   03/24/18 165 lb (74.8 kg)   11/17/17 167 lb 12 oz (76.1 kg)   11/15/16 171 lb (77.6 kg)     Estimated body mass index is 31.47 kg/(m^2) as calculated from the following:    Height as of 3/24/18: 5' 4\" (1.626 m).    Weight as of this encounter: 183 lb 5 oz (83.2 kg).    Weight concerns  She'd like to weight 20 - 30 lbs less, she feels her metabolism has really changed, she gains weight very easily over the past few months. She knows she emotionally eats. She exercises regularly (walks, yoga)    Impaired fasting glucose Follow-up      Patient is checking blood sugars: not at all    Diabetic concerns: None     Symptoms of hypoglycemia (low blood sugar): none     Paresthesias (numbness or burning in feet) or sores: No       BP Readings from Last 2 Encounters:   11/20/18 139/90   03/24/18 124/80     Hemoglobin A1C (%)   Date Value   11/20/2018 5.5   11/17/2017 5.6     LDL Cholesterol Calculated (mg/dL)   Date Value   11/17/2017 149 (H)   11/15/2016 136 (H)       Diabetes Management Resources  Hypertension Follow-up      Outpatient blood pressures are being checked at home.  Results are 110's/80's.    Blood pressure is higher when she's fasting    Low Salt Diet: no added salt  BP Readings from Last 3 Encounters:   11/20/18 139/90   03/24/18 124/80   11/17/17 136/82          Chronic Kidney Disease " Follow-up      Current NSAID use?  No  GFR Estimate   Date Value Ref Range Status   11/17/2017 51 (L) >60 mL/min/1.7m2 Final     Comment:     Non  GFR Calc   11/15/2016 49 (L) >60 mL/min/1.7m2 Final     Comment:     Non  GFR Calc   11/13/2015 56 (L) >60 mL/min/1.7m2 Final     Comment:     Non  GFR Calc         Today's PHQ-2 Score:   PHQ-2 ( 1999 [employer]) 11/20/2018   Q1: Little interest or pleasure in doing things 0   Q2: Feeling down, depressed or hopeless 0   PHQ-2 Score 0   Q1: Little interest or pleasure in doing things Not at all   Q2: Feeling down, depressed or hopeless Not at all   PHQ-2 Score 0     Abuse: Current or Past(Physical, Sexual or Emotional)- No  Do you feel safe in your environment - Yes    Social History   Substance Use Topics     Smoking status: Never Smoker     Smokeless tobacco: Never Used     Alcohol use Yes      Comment: rare - one glass per week of wine     Alcohol Use 11/20/2018   If you drink alcohol do you typically have greater than 3 drinks per day OR greater than 7 drinks per week? No     Reviewed orders with patient.  Reviewed health maintenance and updated orders accordingly - Yes  BP Readings from Last 3 Encounters:   11/20/18 139/90   03/24/18 124/80   11/17/17 136/82    Wt Readings from Last 3 Encounters:   11/20/18 183 lb 5 oz (83.2 kg)   03/24/18 165 lb (74.8 kg)   11/17/17 167 lb 12 oz (76.1 kg)                  Patient Active Problem List   Diagnosis     Family history of colonic polyps     CKD (chronic kidney disease) stage 3, GFR 30-59 ml/min (H)     Perimenopause     Tubular adenoma of colon     Impaired fasting glucose     Essential hypertension with goal blood pressure less than 140/90     Elevated bilirubin     Seasonal affective disorder (H)     Abnormal weight gain     Past Surgical History:   Procedure Laterality Date     BIOPSY BREAST      X 2     COLONOSCOPY  6/15/2009    Polyp - Due in 5 years     CYSTOSCOPY,  RETROGRADES, MANIPULATE STONE, INSERT STENT, COMBINED  2007     EXCISE PILONIDAL CYST, EXTENSIVE       LASER REVOLIX LITHOTRIPSY URETER(S), INSERT STENT, COMBINED  2007     Sebaceous Cyst Removal  2008    Back       Social History   Substance Use Topics     Smoking status: Never Smoker     Smokeless tobacco: Never Used     Alcohol use Yes      Comment: rare - one glass per week of wine     Family History   Problem Relation Age of Onset     Hypertension Mother      Cerebrovascular Disease Mother      at age 50     Allergies Mother      Arthritis Mother      osteo     Depression Mother      Cancer - colorectal Mother      BENIGN polyps 68 (2007)     Hypertension Father      Allergies Father      Depression Father      Cerebrovascular Disease Father      HEART DISEASE Father      had heart surgery and a stroke      Cancer - colorectal Maternal Grandmother      at age 50     Alzheimer Disease Maternal Grandmother      diag at age 80     Eye Disorder Maternal Grandmother      cateracts     Osteoporosis Maternal Grandmother      Cancer Maternal Grandfather      smoker     Cancer Paternal Grandfather      smoker     Hypertension Brother      Depression Sister      Gynecology Sister      cervical cancer at age 30     Cancer Sister      cervical         Current Outpatient Prescriptions   Medication Sig Dispense Refill     ACETAMINOPHEN PO Take 500 mg by mouth.       ACIDOPHILUS OR CAPS 2 caps. daily  0     DIGEST ENZYMES-ANTICHOLINERGIC OR CAPS 2 capsules daily       FISH OIL None Entered       hydrOXYzine (ATARAX) 25 MG tablet Take 1/2-2 tabs po HS prn itching 30 tablet 6     lisinopril (PRINIVIL/ZESTRIL) 10 MG tablet Take 1 tablet (10 mg) by mouth 2 times daily 180 tablet 3     MAGNESIUM 300 MG OR CAPS None Entered       MINERAL COMPLEX OR None Entered       Magdalena Haddad, (NETTLE LEAF PO)        order for DME Equipment being ordered: SAD lite box 1 each 0     VITAMIN D OR 1 cap daily in the winter        calcipotriene (DOVONOX) 0.005 % cream Apply to affected area bid as needed (Patient not taking: Reported on 11/20/2018) 60 g 3     Allergies   Allergen Reactions     Seasonal Allergies      Vicodin [Hydrocodone-Acetaminophen]      Recent Labs   Lab Test  11/20/18   0927  11/17/17   0907  11/15/16   0853  11/13/15   0854   11/06/13   1001   03/28/12   1805  11/02/11   0931   A1C  5.5  5.6  5.6   --    --    --    --    --    --    LDL   --   149*  136*  156*   < >  138*   < >   --   136*   HDL   --   53  46*  58   < >  52   < >   --   57   TRIG   --   84  84  112   < >  99   < >   --   69   ALT   --   24   --    --    --   29   --   12   --    CR   --   1.11*  1.14*  1.03   < >  1.03   < >  1.10*   --    GFRESTIMATED   --   51*  49*  56*   < >  56*   < >  53*   --    GFRESTBLACK   --   62  60*  68   < >  68   < >  64   --    POTASSIUM   --   3.9  3.8  3.8   < >  4.1   < >  3.5   --    TSH   --   1.91   --    --    --    --    --    --   1.11    < > = values in this interval not displayed.        Patient over age 50, mutual decision to screen reflected in health maintenance.    Pertinent mammograms are reviewed under the imaging tab.  History of abnormal Pap smear:   NO - age 30-65 PAP every 5 years with negative HPV co-testing recommended  Last 3 Pap and HPV Results:   PAP / HPV Latest Ref Rng & Units 11/17/2017 11/6/2013 11/5/2012   PAP - NIL NIL ASC-US(A)   HPV 16 DNA NEG:Negative Negative - -   HPV 18 DNA NEG:Negative Negative - -   OTHER HR HPV NEG:Negative Negative - -     PAP / HPV Latest Ref Rng & Units 11/17/2017 11/6/2013 11/5/2012   PAP - NIL NIL ASC-US(A)   HPV 16 DNA NEG:Negative Negative - -   HPV 18 DNA NEG:Negative Negative - -   OTHER HR HPV NEG:Negative Negative - -     Reviewed and updated as needed this visit by clinical staff  Tobacco  Meds  Med Hx  Surg Hx  Fam Hx  Soc Hx        Reviewed and updated as needed this visit by Provider  Med Hx        Past Medical History:   Diagnosis Date      Calculus of kidney      Calculus, kidney      CKD (chronic kidney disease) stage 3, GFR 30-59 ml/min (H)      Hypertension goal BP (blood pressure) < 140/90 2012     Seasonal allergic rhinitis     seasonal allergies, cats     Tubular adenoma of colon 10/7/2014    Colonoscopy 10/1/2014, repeat 2019     Urinary tract infection, site not specified  to early     Past history of       Past Surgical History:   Procedure Laterality Date     BIOPSY BREAST      X 2     COLONOSCOPY  6/15/2009    Polyp - Due in 5 years     CYSTOSCOPY, RETROGRADES, MANIPULATE STONE, INSERT STENT, COMBINED       EXCISE PILONIDAL CYST, EXTENSIVE       LASER REVOLIX LITHOTRIPSY URETER(S), INSERT STENT, COMBINED       Sebaceous Cyst Removal      Back     Obstetric History       T0      L0     SAB0   TAB4   Ectopic0   Multiple0   Live Births0       # Outcome Date GA Lbr Jeffery/2nd Weight Sex Delivery Anes PTL Lv   4 TAB            3 TAB            2 TAB            1 TAB                   Review of Systems   Constitutional: Negative for chills and fever.   HENT: Positive for congestion. Negative for ear pain, hearing loss and sore throat.    Eyes: Negative for pain and visual disturbance.   Respiratory: Negative for cough and shortness of breath.    Cardiovascular: Negative for chest pain, palpitations and peripheral edema.   Gastrointestinal: Negative for abdominal pain, constipation, diarrhea, heartburn, hematochezia and nausea.   Breasts:  Negative for breast mass.   Genitourinary: Negative for dysuria, frequency, genital sores, hematuria, pelvic pain, urgency and vaginal discharge.   Musculoskeletal: Negative for arthralgias, joint swelling and myalgias.   Skin: Negative for rash.   Neurological: Negative for dizziness, weakness, headaches and paresthesias.   Psychiatric/Behavioral: Negative for mood changes. The patient is not nervous/anxious.      Family History   Problem Relation Age of Onset      Hypertension Mother      Cerebrovascular Disease Mother      at age 50     Allergies Mother      Arthritis Mother      osteo     Depression Mother      Cancer - colorectal Mother      BENIGN polyps 68 (2007)     Hypertension Father      Allergies Father      Depression Father      Cerebrovascular Disease Father      HEART DISEASE Father      had heart surgery and a stroke      Cancer - colorectal Maternal Grandmother      at age 50     Alzheimer Disease Maternal Grandmother      diag at age 80     Eye Disorder Maternal Grandmother      cateracts     Osteoporosis Maternal Grandmother      Cancer Maternal Grandfather      smoker     Cancer Paternal Grandfather      smoker     Hypertension Brother      Depression Sister      Gynecology Sister      cervical cancer at age 30     Cancer Sister      cervical      OBJECTIVE:   /90  Pulse 65  Temp 98.3  F (36.8  C) (Oral)  Resp 16  Wt 183 lb 5 oz (83.2 kg)  SpO2 98%  Breastfeeding? No  BMI 31.47 kg/m2  Physical Exam  GENERAL: healthy, alert and no distress  EYES: Eyes grossly normal to inspection, PERRL and conjunctivae and sclerae normal  HENT: ear canals and TM's normal, nose and mouth without ulcers or lesions  NECK: no adenopathy, no asymmetry, masses, or scars and thyroid normal to palpation  RESP: lungs clear to auscultation - no rales, rhonchi or wheezes  BREAST: normal without masses, tenderness or nipple discharge and no palpable axillary masses or adenopathy  CV: regular rate and rhythm, normal S1 S2, no S3 or S4, no murmur, click or rub, no peripheral edema and peripheral pulses strong  ABDOMEN: soft, nontender, no hepatosplenomegaly, no masses and bowel sounds normal  MS: no gross musculoskeletal defects noted, no edema  SKIN: no suspicious lesions or rashes  NEURO: Normal strength and tone, mentation intact and speech normal  PSYCH: mentation appears normal, affect normal/bright    Results for orders placed or performed in visit on 11/20/18    Hemoglobin A1c   Result Value Ref Range    Hemoglobin A1C 5.5 0 - 5.6 %      ASSESSMENT/PLAN:   1. Well woman exam (no gynecological exam)  routine  - DEPRESSION ACTION PLAN (DAP)  - Lipid panel reflex to direct LDL Fasting    2. Seasonal affective disorder (H)  No flowsheet data found.   Letter written for SAD light, see letter  - DEPRESSION ACTION PLAN (DAP)    3. Essential hypertension with goal blood pressure less than 140/90  BP Readings from Last 3 Encounters:   11/20/18 139/90   03/24/18 124/80   11/17/17 136/82    not at goal, she wants to try to lose weight, reduce sodium, she prefers to not be on medication at all, does not want to increase meds at this time  Continue to follow, if unable to reduce BP, would recommend increasing medication  - Comprehensive metabolic panel    4. CKD (chronic kidney disease) stage 3, GFR 30-59 ml/min (H)  GFR Estimate   Date Value Ref Range Status   11/17/2017 51 (L) >60 mL/min/1.7m2 Final     Comment:     Non  GFR Calc   11/15/2016 49 (L) >60 mL/min/1.7m2 Final     Comment:     Non  GFR Calc   11/13/2015 56 (L) >60 mL/min/1.7m2 Final     Comment:     Non  GFR Calc   stable, probably hypertension related  - Comprehensive metabolic panel  - Lipid panel reflex to direct LDL Fasting  - Albumin Random Urine Quantitative with Creat Ratio    5. Tubular adenoma of colon  She gets regular colonoscopies, next due 9/2019    6. Impaired fasting glucose  Recheck today; normal!  - Hemoglobin A1c    7. Elevated bilirubin  Previously noted with otherwise normal LFTs, recheck today, encourage minimal to no alcohol use    8. Abnormal weight gain  Suspect perimenopausal related, but will screen for hypothyroidism  - TSH with free T4 reflex    COUNSELING:  Reviewed preventive health counseling, as reflected in patient instructions    BP Readings from Last 1 Encounters:   11/20/18 139/90     Estimated body mass index is 31.47 kg/(m^2) as  "calculated from the following:    Height as of 3/24/18: 5' 4\" (1.626 m).    Weight as of this encounter: 183 lb 5 oz (83.2 kg).    Weight management plan: Discussed healthy diet and exercise guidelines and patient will follow up in 12 months in clinic to re-evaluate. see AVS     reports that she has never smoked. She has never used smokeless tobacco.    Counseling Resources:  ATP IV Guidelines  Pooled Cohorts Equation Calculator  Breast Cancer Risk Calculator  FRAX Risk Assessment  ICSI Preventive Guidelines  Dietary Guidelines for Americans, 2010  USDA's MyPlate  ASA Prophylaxis  Lung CA Screening    Monica Lemus MD  [facility]  "

## 2018-11-20 NOTE — LETTER
My Depression Action Plan  Name: Franchesca Hyatt   Date of Birth 1960  Date: 11/20/2018    My doctor: Monica Lemus   My clinic: [address] [address] [phone]          GREEN    ZONE   Good Control    What it looks like:     Things are going generally well. You have normal up s and down s. You may even feel depressed from time to time, but bad moods usually last less than a day.   What you need to do:  1. Continue to care for yourself (see self care plan)  2. Check your depression survival kit and update it as needed  3. Follow your physician s recommendations including any medication.  4. Do not stop taking medication unless you consult with your physician first.           YELLOW         ZONE Getting Worse    What it looks like:     Depression is starting to interfere with your life.     It may be hard to get out of bed; you may be starting to isolate yourself from others.    Symptoms of depression are starting to last most all day and this has happened for several days.     You may have suicidal thoughts but they are not constant.   What you need to do:     1. Call your care team, your response to treatment will improve if you keep your care team informed of your progress. Yellow periods are signs an adjustment may need to be made.     2. Continue your self-care, even if you have to fake it!    3. Talk to someone in your support network    4. Open up your depression survival kit           RED    ZONE Medical Alert - Get Help    What it looks like:     Depression is seriously interfering with your life.     You may experience these or other symptoms: You can t get out of bed most days, can t work or engage in other necessary activities, you have trouble taking care of basic hygiene, or basic responsibilities, thoughts of suicide or death that will not go away, self-injurious behavior.     What you need to do:  1. Call your care  team and request a same-day appointment. If they are not available (weekends or after hours) call your local crisis line, emergency room or 911.            Depression Self Care Plan / Survival Kit    Self-Care for Depression  Here s the deal. Your body and mind are really not as separate as most people think.  What you do and think affects how you feel and how you feel influences what you do and think. This means if you do things that people who feel good do, it will help you feel better.  Sometimes this is all it takes.  There is also a place for medication and therapy depending on how severe your depression is, so be sure to consult with your medical provider and/ or Behavioral Health Consultant if your symptoms are worsening or not improving.     In order to better manage my stress, I will:    Exercise  Get some form of exercise, every day. This will help reduce pain and release endorphins, the  feel good  chemicals in your brain. This is almost as good as taking antidepressants!  This is not the same as joining a gym and then never going! (they count on that by the way ) It can be as simple as just going for a walk or doing some gardening, anything that will get you moving.      Hygiene   Maintain good hygiene (Get out of bed in the morning, Make your bed, Brush your teeth, Take a shower, and Get dressed like you were going to work, even if you are unemployed).  If your clothes don't fit try to get ones that do.    Diet  I will strive to eat foods that are good for me, drink plenty of water, and avoid excessive sugar, caffeine, alcohol, and other mood-altering substances.  Some foods that are helpful in depression are: complex carbohydrates, B vitamins, flaxseed, fish or fish oil, fresh fruits and vegetables.    Psychotherapy  I agree to participate in Individual Therapy (if recommended).    Medication  If prescribed medications, I agree to take them.  Missing doses can result in serious side effects.  I  understand that drinking alcohol, or other illicit drug use, may cause potential side effects.  I will not stop my medication abruptly without first discussing it with my provider.    Staying Connected With Others  I will stay in touch with my friends, family members, and my primary care provider/team.    Use your imagination  Be creative.  We all have a creative side; it doesn t matter if it s oil painting, sand castles, or mud pies! This will also kick up the endorphins.    Witness Beauty  (AKA stop and smell the roses) Take a look outside, even in mid-winter. Notice colors, textures. Watch the squirrels and birds.     Service to others  Be of service to others.  There is always someone else in need.  By helping others we can  get out of ourselves  and remember the really important things.  This also provides opportunities for practicing all the other parts of the program.    Humor  Laugh and be silly!  Adjust your TV habits for less news and crime-drama and more comedy.    Control your stress  Try breathing deep, massage therapy, biofeedback, and meditation. Find time to relax each day.     My support system    Clinic Contact:  Phone number:    Contact 1:  Phone number:    Contact 2:  Phone number:    Christian/:  Phone number:    Therapist:  Phone number:    Local crisis center:    Phone number:    Other community support:  Phone number:

## 2018-11-20 NOTE — PATIENT INSTRUCTIONS
Preventive Health Recommendations  Female Ages 50 - 64    Yearly exam: See your health care provider every year in order to  o Review health changes.   o Discuss preventive care.    o Review your medicines if your doctor has prescribed any.      Get a Pap test every three years (unless you have an abnormal result and your provider advises testing more often).    If you get Pap tests with HPV test, you only need to test every 5 years, unless you have an abnormal result.     You do not need a Pap test if your uterus was removed (hysterectomy) and you have not had cancer.    You should be tested each year for STDs (sexually transmitted diseases) if you're at risk.     Have a mammogram every 1 to 2 years.    Have a colonoscopy at age 50, or have a yearly FIT test (stool test). These exams screen for colon cancer.      Have a cholesterol test every 5 years, or more often if advised.    Have a diabetes test (fasting glucose) every three years. If you are at risk for diabetes, you should have this test more often.     If you are at risk for osteoporosis (brittle bone disease), think about having a bone density scan (DEXA).    Shots: Get a flu shot each year. Get a tetanus shot every 10 years.    Nutrition:     Eat at least 5 servings of fruits and vegetables each day.    Eat whole-grain bread, whole-wheat pasta and brown rice instead of white grains and rice.    Get adequate Calcium and Vitamin D.     Lifestyle    Exercise at least 150 minutes a week (30 minutes a day, 5 days a week). This will help you control your weight and prevent disease.    Limit alcohol to one drink per day.    No smoking.     Wear sunscreen to prevent skin cancer.     See your dentist every six months for an exam and cleaning.    See your eye doctor every 1 to 2 years.    Results for orders placed or performed in visit on 11/20/18   Hemoglobin A1c   Result Value Ref Range    Hemoglobin A1C 5.5 0 - 5.6 %    Liver Function Studies -   Recent Labs    Lab Test  11/17/17   0907   PROTTOTAL  7.6   ALBUMIN  4.4   BILITOTAL  1.9*   ALKPHOS  50   AST  16   ALT  24     Weight Loss  Exercise for at least 30 min 5 times per week, and lift weights 2-3 times per week.     Eat all your meals in a 12 hour window, and load your calories towards the morning. Make sure your breakfast has protein, fat and carbohydrates. The less processed your food the better.    Reduce salt in your diet.    Reduce or eliminate alcohol.

## 2018-11-20 NOTE — MR AVS SNAPSHOT
After Visit Summary   11/20/2018    Franchesca Hyatt    MRN: [id number]           Patient Information     Date Of Birth          1960        Visit Information        Provider Department      11/20/2018 9:20 AM Monica Lemus MD [facility]        Today's Diagnoses     Well woman exam (no gynecological exam)    -  1    Seasonal affective disorder (H)        Essential hypertension with goal blood pressure less than 140/90        CKD (chronic kidney disease) stage 3, GFR 30-59 ml/min (H)        Tubular adenoma of colon        Impaired fasting glucose        Elevated bilirubin        Abnormal weight gain          Care Instructions      Preventive Health Recommendations  Female Ages 50 - 64    Yearly exam: See your health care provider every year in order to  o Review health changes.   o Discuss preventive care.    o Review your medicines if your doctor has prescribed any.      Get a Pap test every three years (unless you have an abnormal result and your provider advises testing more often).    If you get Pap tests with HPV test, you only need to test every 5 years, unless you have an abnormal result.     You do not need a Pap test if your uterus was removed (hysterectomy) and you have not had cancer.    You should be tested each year for STDs (sexually transmitted diseases) if you're at risk.     Have a mammogram every 1 to 2 years.    Have a colonoscopy at age 50, or have a yearly FIT test (stool test). These exams screen for colon cancer.      Have a cholesterol test every 5 years, or more often if advised.    Have a diabetes test (fasting glucose) every three years. If you are at risk for diabetes, you should have this test more often.     If you are at risk for osteoporosis (brittle bone disease), think about having a bone density scan (DEXA).    Shots: Get a flu shot each year. Get a tetanus shot every 10 years.    Nutrition:     Eat at least 5 servings of fruits and  vegetables each day.    Eat whole-grain bread, whole-wheat pasta and brown rice instead of white grains and rice.    Get adequate Calcium and Vitamin D.     Lifestyle    Exercise at least 150 minutes a week (30 minutes a day, 5 days a week). This will help you control your weight and prevent disease.    Limit alcohol to one drink per day.    No smoking.     Wear sunscreen to prevent skin cancer.     See your dentist every six months for an exam and cleaning.    See your eye doctor every 1 to 2 years.    Results for orders placed or performed in visit on 11/20/18   Hemoglobin A1c   Result Value Ref Range    Hemoglobin A1C 5.5 0 - 5.6 %    Liver Function Studies -   Recent Labs   Lab Test  11/17/17   0907   PROTTOTAL  7.6   ALBUMIN  4.4   BILITOTAL  1.9*   ALKPHOS  50   AST  16   ALT  24     Weight Loss  Exercise for at least 30 min 5 times per week, and lift weights 2-3 times per week.     Eat all your meals in a 12 hour window, and load your calories towards the morning. Make sure your breakfast has protein, fat and carbohydrates. The less processed your food the better.    Reduce salt in your diet.    Reduce or eliminate alcohol.          Follow-ups after your visit        Follow-up notes from your care team     Return in about 1 year (around 11/20/2019) for Physical Exam, Hypertension follow up.      Who to contact     If you have questions or need follow up information about today's clinic visit or your schedule please contact [facility] directly at [phone].  Normal or non-critical lab and imaging results will be communicated to you by MyChart, letter or phone within 4 business days after the clinic has received the results. If you do not hear from us within 7 days, please contact the clinic through MyChart or phone. If you have a critical or abnormal lab result, we will notify you by phone as soon as possible.  Submit refill requests through [employer] or call your pharmacy and they will forward  the refill request to us. Please allow 3 business days for your refill to be completed.          Additional Information About Your Visit        Care EveryWhere ID     This is your Care EveryWhere ID. This could be used by other organizations to access your [facility] medical records  [id number]        Your Vitals Were     Pulse Temperature Respirations Pulse Oximetry Breastfeeding? BMI (Body Mass Index)    65 98.3  F (36.8  C) (Oral) 16 98% No 31.47 kg/m2       Blood Pressure from Last 3 Encounters:   11/20/18 139/90   03/24/18 124/80   11/17/17 136/82    Weight from Last 3 Encounters:   11/20/18 183 lb 5 oz (83.2 kg)   03/24/18 165 lb (74.8 kg)   11/17/17 167 lb 12 oz (76.1 kg)              We Performed the Following     Albumin Random Urine Quantitative with Creat Ratio     Bilirubin Direct and Total     Comprehensive metabolic panel     DEPRESSION ACTION PLAN (DAP)     Hemoglobin A1c     Lipid panel reflex to direct LDL Fasting     TSH with free T4 reflex        Primary Care Provider Office Phone # Fax #    Monica Lemus [phone] [address] [id number]        Equal Access to Services     [facility] : Hadii attila Varma, waabdida sandra, qaybta kaalmada carlos, estrella thomas . So [facility] [phone].    ATENCIÓN: Si habla español, tiene a delvalle disposición servicios gratuitos de asistencia lingüística. [facility] [phone].    We comply with applicable federal civil rights laws and [place] laws. We do not discriminate on the basis of race, color, national origin, age, disability, sex, sexual orientation, or gender identity.            Thank you!     Thank you for choosing [facility]  for your care. Our goal is always to provide you with excellent care. Hearing back from our patients is one way we can continue to improve our services. Please take a few minutes to complete the written survey that you may receive  in the mail after your visit with us. Thank you!             Your Updated Medication List - Protect others around you: Learn how to safely use, store and throw away your medicines at www.disposemymeds.org.          This list is accurate as of 11/20/18 10:12 AM.  Always use your most recent med list.                   Brand Name Dispense Instructions for use Diagnosis    ACETAMINOPHEN PO      Take 500 mg by mouth.        acidophilus Caps      2 caps. daily        calcipotriene 0.005 % cream    DOVONOX    60 g    Apply to affected area bid as needed    Psoriasis       Digest Enzymes-Anticholinergic Caps      2 capsules daily        FISH OIL      None Entered        hydrOXYzine 25 MG tablet    ATARAX    30 tablet    Take 1/2-2 tabs po HS prn itching    Psoriasis       lisinopril 10 MG tablet    PRINIVIL/ZESTRIL    180 tablet    Take 1 tablet (10 mg) by mouth 2 times daily    Essential hypertension with goal blood pressure less than 140/90, CKD (chronic kidney disease) stage 3, GFR 30-59 ml/min (H)       Magnesium 300 MG Caps      None Entered        MINERAL COMPLEX PO      None Entered        NETTLE LEAF PO           order for DME     1 each    Equipment being ordered: SAD lite box    Seasonal affective disorder (H)       VITAMIN D PO      1 cap daily in the winter

## 2018-11-20 NOTE — LETTER
"November 20, 2018    Re: Franchesca Hyatt    To whom it may concern,     This letter is written to document that Ms. Franchesca Hyatt has been under the medical care of Dr. Monica Lemus and I recommend that she purchase and use full spectrum light (\"SAD\" light) for 2 hours every morning and night to help improve mood to help treat a medical condition.    Please contact me with any questions or concerns.    Sincerely,    Monica Lemus MD    "

## 2018-11-20 NOTE — LETTER
November 29, 2018      Franchesca Orrwood  [address]  [facility] [address]        Dear ,    We are writing to inform you of your test results.    Hello!  It was a pleasure to see you in clinic!  Thank you for getting labs done.     Your microalbumen is normal, which is great news. This means you do not have protein in the urine, and that your kidneys are currently functioning well. Keeping blood pressure and blood fats low is the best way to preserve your kidney function over your lifetime.    Your thyroid function is normal, which is good news.  This means that you do not have hyperthyroidism or hypothyroidism.     Your HgA1C, also called glycosylated hemoglobin, which measures the level of sugar in your blood over the past few months, is normal (and lower than last year!), you do not have diabetes or pre-diabetes.    The testing of your liver function (including bilirubin) and electrolytes was normal.  Kidney function is assessed by blood work that measures creatinine and calculates estimated GFR (glomerular filtration rate).  By current criteria you do have stage 3 chronic kidney disease as your eGFR is < 60.  This is not something that is urgent as your value has not changed much with time.    However, it does mean will monitor your labs (urine and blood tests) every 6-12 months and we will keep trying to make sure your blood pressure and cholesterol are at goal to keep your kidneys as healthy as possible.     Your cholesterol is abnormal. Your total cholesterol, triglycerides and LDL are too high and your HDL is too low. This does elevated your risk of heart disease, but you do not need medication at this time to lower your cholesterol.    Desired or goal levels are:  CHOLESTEROL: Desirable is less than 200.  HDL (Good Cholesterol): Desirable is greater than 40 in men and greater than 50 in women.  LDL (Bad Cholesterol): Desirable is less than 130 or less than 100 in patients with diabetes or  vascular disease. For some patients with diabetes or vascular disease, the desireable LDL is less that 70.  TRIGLYCERIDES: Desirable is less than 150.    Please follow the recommendations below and schedule a lab only appointment ([phone]) in 3 months for a repeat fasting test. Please don't have anything to eat or drink (except water) for 8 hours prior to the test.  I recommend that you take Omega-3 fatty acids (available in capsules) to improve your triglycerides. You need 2000 - 4000 mg daily of EPA + DHA. This usually means 4 - 8 capsules a day, depending on the strength you buy.  To keep triglycerides in check, limit sugars in your diet including juice, excess fruit, bread, pasta, rice and  cereal.     As you may know, abnormal cholesterol is one factor that increases your risk for heart disease and stroke. You can improve your cholesterol by controlling the amount and type of fat you eat and by increasing your daily activity level.    Here are some ways to improve your nutrition (adapted from the American Academy of Family Practice handout):  Eat less fat (especially butter, Crisco and other saturated fats)  Buy lean cuts of meat; reduce your portions of red meat or substitute poultry or fish  Use skim milk and low-fat dairy products  Eat no more than 4 egg yolks per week  Avoid fried or fast foods that are high in fat  Eat more fruits and vegetables    Also consider starting or increasing your aerobic activity; this is the best way to improve HDL (good) cholesterol. Exercise for at least 30 min 5 times per week, and lift weights 2-3 times per week.     If you have any questions, please contact the clinic or schedule an appointment with me, thank you!    Resulted Orders   Hemoglobin A1c   Result Value Ref Range    Hemoglobin A1C 5.5 0 - 5.6 %      Comment:      Normal <5.7% Prediabetes 5.7-6.4%  Diabetes 6.5% or higher - adopted from ADA   consensus guidelines.     Comprehensive metabolic panel   Result Value  Ref Range    Sodium 138 133 - 144 mmol/L    Potassium 3.9 3.4 - 5.3 mmol/L    Chloride 105 94 - 109 mmol/L    Carbon Dioxide 21 20 - 32 mmol/L    Anion Gap 12 3 - 14 mmol/L    Glucose 103 (H) 70 - 99 mg/dL      Comment:      Fasting specimen    Urea Nitrogen 23 7 - 30 mg/dL    Creatinine 1.05 (H) 0.52 - 1.04 mg/dL    GFR Estimate 54 (L) >60 mL/min/1.7m2      Comment:      Non  GFR Calc    GFR Estimate If Black 65 >60 mL/min/1.7m2      Comment:       GFR Calc    Calcium 9.3 8.5 - 10.1 mg/dL    Bilirubin Total 1.1 0.2 - 1.3 mg/dL    Albumin 4.2 3.4 - 5.0 g/dL    Protein Total 7.2 6.8 - 8.8 g/dL    Alkaline Phosphatase 48 40 - 150 U/L    ALT 25 0 - 50 U/L    AST 11 0 - 45 U/L   Lipid panel reflex to direct LDL Fasting   Result Value Ref Range    Cholesterol 244 (H) <200 mg/dL      Comment:      Desirable:       <200 mg/dl    Triglycerides 153 (H) <150 mg/dL      Comment:      Borderline high:  150-199 mg/dl  High:             200-499 mg/dl  Very high:       >499 mg/dl  Fasting specimen      HDL Cholesterol 41 (L) >49 mg/dL    LDL Cholesterol Calculated 172 (H) <100 mg/dL      Comment:      Above desirable:  100-129 mg/dl  Borderline High:  130-159 mg/dL  High:             160-189 mg/dL  Very high:       >189 mg/dl      Non HDL Cholesterol 203 (H) <130 mg/dL      Comment:      Above Desirable:  130-159 mg/dl  Borderline high:  160-189 mg/dl  High:             190-219 mg/dl  Very high:       >219 mg/dl     Albumin Random Urine Quantitative with Creat Ratio   Result Value Ref Range    Creatinine Urine 93 mg/dL    Albumin Urine mg/L 6 mg/L    Albumin Urine mg/g Cr 6.14 0 - 25 mg/g Cr   Bilirubin Direct and Total   Result Value Ref Range    Bilirubin Direct 0.2 0.0 - 0.2 mg/dL    Bilirubin Total 1.1 0.2 - 1.3 mg/dL   TSH with free T4 reflex   Result Value Ref Range    TSH 1.76 0.40 - 4.00 mU/L       If you have any questions or concerns, please call the clinic at the number listed above.        Sincerely,        Monica Lemus MD/nr

## 2018-11-21 LAB
ALBUMIN SERPL-MCNC: 4.2 G/DL (ref 3.4–5)
ALP SERPL-CCNC: 48 U/L (ref 40–150)
ALT SERPL W P-5'-P-CCNC: 25 U/L (ref 0–50)
ANION GAP SERPL CALCULATED.3IONS-SCNC: 12 MMOL/L (ref 3–14)
AST SERPL W P-5'-P-CCNC: 11 U/L (ref 0–45)
BILIRUB SERPL-MCNC: 1.1 MG/DL (ref 0.2–1.3)
BUN SERPL-MCNC: 23 MG/DL (ref 7–30)
CALCIUM SERPL-MCNC: 9.3 MG/DL (ref 8.5–10.1)
CHLORIDE SERPL-SCNC: 105 MMOL/L (ref 94–109)
CHOLEST SERPL-MCNC: 244 MG/DL
CO2 SERPL-SCNC: 21 MMOL/L (ref 20–32)
CREAT SERPL-MCNC: 1.05 MG/DL (ref 0.52–1.04)
CREAT UR-MCNC: 93 MG/DL
GFR SERPL CREATININE-BSD FRML MDRD: 54 ML/MIN/1.7M2
GLUCOSE SERPL-MCNC: 103 MG/DL (ref 70–99)
HDLC SERPL-MCNC: 41 MG/DL
LDLC SERPL CALC-MCNC: 172 MG/DL
MICROALBUMIN UR-MCNC: 6 MG/L
MICROALBUMIN/CREAT UR: 6.14 MG/G CR (ref 0–25)
NONHDLC SERPL-MCNC: 203 MG/DL
POTASSIUM SERPL-SCNC: 3.9 MMOL/L (ref 3.4–5.3)
PROT SERPL-MCNC: 7.2 G/DL (ref 6.8–8.8)
SODIUM SERPL-SCNC: 138 MMOL/L (ref 133–144)
TRIGL SERPL-MCNC: 153 MG/DL
TSH SERPL DL<=0.005 MIU/L-ACNC: 1.76 MU/L (ref 0.4–4)

## 2018-11-29 PROBLEM — E78.00 HYPERCHOLESTEREMIA: Status: ACTIVE | Noted: 2018-11-29

## 2018-11-29 NOTE — PROGRESS NOTES
Hello!  It was a pleasure to see you in clinic!  Thank you for getting labs done.     Your microalbumen is normal, which is great news. This means you do not have protein in the urine, and that your kidneys are currently functioning well. Keeping blood pressure and blood fats low is the best way to preserve your kidney function over your lifetime.    Your thyroid function is normal, which is good news.  This means that you do not have hyperthyroidism or hypothyroidism.     Your HgA1C, also called glycosylated hemoglobin, which measures the level of sugar in your blood over the past few months, is normal (and lower than last year!), you do not have diabetes or pre-diabetes.    The testing of your liver function (including bilirubin) and electrolytes was normal.  Kidney function is assessed by blood work that measures creatinine and calculates estimated GFR (glomerular filtration rate).  By current criteria you do have stage 3 chronic kidney disease as your eGFR is < 60.  This is not something that is urgent as your value has not changed much with time.    However, it does mean will monitor your labs (urine and blood tests) every 6-12 months and we will keep trying to make sure your blood pressure and cholesterol are at goal to keep your kidneys as healthy as possible.     Your cholesterol is abnormal. Your total cholesterol, triglycerides and LDL are too high and your HDL is too low. This does elevated your risk of heart disease, but you do not need medication at this time to lower your cholesterol.    Desired or goal levels are:  CHOLESTEROL: Desirable is less than 200.  HDL (Good Cholesterol): Desirable is greater than 40 in men and greater than 50 in women.  LDL (Bad Cholesterol): Desirable is less than 130 or less than 100 in patients with diabetes or vascular disease. For some patients with diabetes or vascular disease, the desireable LDL is less that 70.  TRIGLYCERIDES: Desirable is less than 150.    Please  follow the recommendations below and schedule a lab only appointment ([phone]) in 3 months for a repeat fasting test. Please don't have anything to eat or drink (except water) for 8 hours prior to the test.  I recommend that you take Omega-3 fatty acids (available in capsules) to improve your triglycerides. You need 2000 - 4000 mg daily of EPA + DHA. This usually means 4 - 8 capsules a day, depending on the strength you buy.  To keep triglycerides in check, limit sugars in your diet including juice, excess fruit, bread, pasta, rice and  cereal.     As you may know, abnormal cholesterol is one factor that increases your risk for heart disease and stroke. You can improve your cholesterol by controlling the amount and type of fat you eat and by increasing your daily activity level.    Here are some ways to improve your nutrition (adapted from the American Academy of Family Practice handout):  Eat less fat (especially butter, Crisco and other saturated fats)  Buy lean cuts of meat; reduce your portions of red meat or substitute poultry or fish  Use skim milk and low-fat dairy products  Eat no more than 4 egg yolks per week  Avoid fried or fast foods that are high in fat  Eat more fruits and vegetables    Also consider starting or increasing your aerobic activity; this is the best way to improve HDL (good) cholesterol. Exercise for at least 30 min 5 times per week, and lift weights 2-3 times per week.     If you have any questions, please contact the clinic or schedule an appointment with me, thank you!    Sincerely,  Dr. Monica Lemus MD  11/29/2018

## 2019-02-12 ENCOUNTER — TELEPHONE (OUTPATIENT)
Dept: FAMILY MEDICINE | Facility: CLINIC | Age: 59
End: 2019-02-12

## 2019-03-21 ENCOUNTER — TELEPHONE (OUTPATIENT)
Dept: FAMILY MEDICINE | Facility: CLINIC | Age: 59
End: 2019-03-21

## 2019-03-21 NOTE — TELEPHONE ENCOUNTER
1 st attempt lmv to call clinic to get message that she needs to be seen for her BP with Dr Lemus not at goal

## 2019-10-31 ENCOUNTER — TELEPHONE (OUTPATIENT)
Dept: FAMILY MEDICINE | Facility: CLINIC | Age: 59
End: 2019-10-31

## 2019-10-31 NOTE — TELEPHONE ENCOUNTER
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      Phone Number Patient can be reached at: Cell number on file:    Telephone Information:   Mobile [phone]       Best Time: ASAP    Can we leave a detailed message on this number? YES    Call taken on 10/31/2019 at 3:43 PM by Nakita King CNA

## 2019-10-31 NOTE — TELEPHONE ENCOUNTER
I personally haven't seen any recommendations about using different colon prep for people with CKD stage III, she's been stable with GFRs in the 50's, so whatever their standard prep is should be fine.    Sincerely,  Dr. Monica Lemus MD  10/31/2019

## 2019-10-31 NOTE — TELEPHONE ENCOUNTER
Patient was instructed by GI team to ask PCP if patient should use Golytley as opposed to regular colon prep due to CKD 3. Last CMP was 11/8/2018 1.05 (stable) for upcoming colonoscopy in 2 weeks.      Please advise    Thanks! Emiliana Woodward RN

## 2019-10-31 NOTE — TELEPHONE ENCOUNTER
Writer called patient back and left detailed message from Dr. Lemus--this was ok'd by patient in previous conversation.    Thanks! Emiliana Woodward RN

## 2019-11-17 DIAGNOSIS — N18.30 CKD (CHRONIC KIDNEY DISEASE) STAGE 3, GFR 30-59 ML/MIN (H): ICD-10-CM

## 2019-11-17 DIAGNOSIS — I10 ESSENTIAL HYPERTENSION WITH GOAL BLOOD PRESSURE LESS THAN 140/90: ICD-10-CM

## 2019-11-17 RX ORDER — LISINOPRIL 10 MG/1
TABLET ORAL
Qty: 60 TABLET | Refills: 0 | Status: SHIPPED | OUTPATIENT
Start: 2019-11-17 | End: 2019-11-22

## 2019-11-17 NOTE — TELEPHONE ENCOUNTER
"Requested Prescriptions   Pending Prescriptions Disp Refills     lisinopril (PRINIVIL/ZESTRIL) 10 MG tablet [Pharmacy Med Name: LISINOPRIL 10MG TABS] 180 tablet 3     Sig: TAKE ONE TABLET BY MOUTH TWICE A DAY       ACE Inhibitors (Including Combos) Protocol Failed - 11/17/2019  1:38 PM        Failed - Blood pressure under 140/90 in past 12 months     BP Readings from Last 3 Encounters:   11/20/18 139/90   03/24/18 124/80   11/17/17 136/82                 Failed - Normal serum creatinine on file in past 12 months     Recent Labs   Lab Test 11/20/18  0927   CR 1.05*             Passed - Recent (12 mo) or future (30 days) visit within the authorizing provider's specialty     Patient has had an office visit with the authorizing provider or a provider within the authorizing providers department within the previous 12 mos or has a future within next 30 days. See \"Patient Info\" tab in inbasket, or \"Choose Columns\" in Meds & Orders section of the refill encounter.              Passed - Medication is active on med list        Passed - Patient is age 18 or older        Passed - No active pregnancy on record        Passed - Normal serum potassium on file in past 12 months     Recent Labs   Lab Test 11/20/18  0927   POTASSIUM 3.9             Passed - No positive pregnancy test within past 12 months          HW Reception -Medication is being filled for 1 time refill only due to:  Patient needs to be seen because it has been more than one year since last visit. due after 11/20 - has refills through 11/27     Signed Prescriptions:                        Disp   Refills    lisinopril (PRINIVIL/ZESTRIL) 10 MG tablet 60 tab*0        Sig: TAKE ONE TABLET BY MOUTH TWICE A DAY  Authorizing Provider: JOHNY GARRIDO  Ordering User: BRO ZURITA        "

## 2019-11-18 ENCOUNTER — TRANSFERRED RECORDS (OUTPATIENT)
Dept: HEALTH INFORMATION MANAGEMENT | Facility: CLINIC | Age: 59
End: 2019-11-18

## 2019-11-19 NOTE — PATIENT INSTRUCTIONS
Preventive Health Recommendations    I recommend getting a Hepatitis A vaccine before going to [place]. You'll need #2 vaccines 6 months apart to be completely protected.    GFR Estimate   Date Value Ref Range Status   11/20/2018 54 (L) >60 mL/min/1.7m2 Final     Comment:     Non  GFR Calc   11/17/2017 51 (L) >60 mL/min/1.7m2 Final     Comment:     Non  GFR Calc   11/15/2016 49 (L) >60 mL/min/1.7m2 Final     Comment:     Non  GFR Calc     Female Ages 50 - 64    Yearly exam: See your health care provider every year in order to  o Review health changes.   o Discuss preventive care.    o Review your medicines if your doctor has prescribed any.      Get a Pap test every three years (unless you have an abnormal result and your provider advises testing more often).    If you get Pap tests with HPV test, you only need to test every 5 years, unless you have an abnormal result.     You do not need a Pap test if your uterus was removed (hysterectomy) and you have not had cancer.    You should be tested each year for STDs (sexually transmitted diseases) if you're at risk.     Have a mammogram every 1 to 2 years.    Have a colonoscopy at age 50, or have a yearly FIT test (stool test). These exams screen for colon cancer.      Have a cholesterol test every 5 years, or more often if advised.    Have a diabetes test (fasting glucose) every three years. If you are at risk for diabetes, you should have this test more often.     If you are at risk for osteoporosis (brittle bone disease), think about having a bone density scan (DEXA).    Shots: Get a flu shot each year. Get a tetanus shot every 10 years.    Nutrition:     Eat at least 5 servings of fruits and vegetables each day.    Eat whole-grain bread, whole-wheat pasta and brown rice instead of white grains and rice.    Get adequate Calcium and Vitamin D.     Lifestyle    Exercise at least 150 minutes a week (30 minutes a day, 5  days a week). This will help you control your weight and prevent disease.    Limit alcohol to one drink per day.    No smoking.     Wear sunscreen to prevent skin cancer.     See your dentist every six months for an exam and cleaning.    See your eye doctor every 1 to 2 years.    Preventive Health Recommendations  Female Ages 50 - 64    Yearly exam: See your health care provider every year in order to  o Review health changes.   o Discuss preventive care.    o Review your medicines if your doctor has prescribed any.      Get a Pap test every three years (unless you have an abnormal result and your provider advises testing more often).    If you get Pap tests with HPV test, you only need to test every 5 years, unless you have an abnormal result.     You do not need a Pap test if your uterus was removed (hysterectomy) and you have not had cancer.    You should be tested each year for STDs (sexually transmitted diseases) if you're at risk.     Have a mammogram every 1 to 2 years.    Have a colonoscopy at age 50, or have a yearly FIT test (stool test). These exams screen for colon cancer.      Have a cholesterol test every 5 years, or more often if advised.    Have a diabetes test (fasting glucose) every three years. If you are at risk for diabetes, you should have this test more often.     If you are at risk for osteoporosis (brittle bone disease), think about having a bone density scan (DEXA).    Shots: Get a flu shot each year. Get a tetanus shot every 10 years.    Nutrition:     Eat at least 5 servings of fruits and vegetables each day.    Eat whole-grain bread, whole-wheat pasta and brown rice instead of white grains and rice.    Get adequate Calcium and Vitamin D.     Lifestyle    Exercise at least 150 minutes a week (30 minutes a day, 5 days a week). This will help you control your weight and prevent disease.    Limit alcohol to one drink per day.    No smoking.     Wear sunscreen to prevent skin cancer.     See  your dentist every six months for an exam and cleaning.    See your eye doctor every 1 to 2 years.    Results for orders placed or performed in visit on 11/22/19   Hemoglobin A1c     Status: None   Result Value Ref Range    Hemoglobin A1C 5.5 0 - 5.6 %      BP Readings from Last 3 Encounters:   11/22/19 128/82   11/20/18 139/90   03/24/18 124/80      Recent Labs   Lab Test 11/20/18  0927 11/17/17  0907  11/13/15  0854 11/07/14  0903   CHOL 244* 219*   < > 236* 204*   HDL 41* 53   < > 58 49*   * 149*   < > 156* 123   TRIG 153* 84   < > 112 162*   CHOLHDLRATIO  --   --   --  4.1 4.2    < > = values in this interval not displayed.

## 2019-11-19 NOTE — PROGRESS NOTES
SUBJECTIVE:   CC: Franchesca Hyatt is an 58 year old woman who presents for preventive health visit and chronic disease management.    Healthy Habits:     Getting at least 3 servings of Calcium per day:  Yes    Bi-annual eye exam:  NO    Dental care twice a year:  Yes    Sleep apnea or symptoms of sleep apnea:  None    Diet:  Carbohydrate counting    Duration of exercise:  15-30 minutes    Taking medications regularly:  Yes    Barriers to taking medications:  Not applicable    Medication side effects:  None    PHQ-2 Total Score: 0    Additional concerns today:  Yes    Ability to successfully perform activities of daily living: No, needs assistance with:   Home safety:  none identified   Hearing impairment: None    Impaired fasting glucose Follow-up      What concerns do you have today about your blood sugar? None     Do you have any of these symptoms? (Select all that apply)  No numbness or tingling in feet.  No redness, sores or blisters on feet.  No complaints of excessive thirst.  No reports of blurry vision.  No significant changes to weight.     Hyperlipidemia Follow-Up      Are you having any of the following symptoms? (Select all that apply)  No complaints of shortness of breath, chest pain or pressure.  No increased sweating or nausea with activity.  No left-sided neck or arm pain.  No complaints of pain in calves when walking 1-2 blocks.    Are you regularly taking any medication or supplement to lower your cholesterol?   Yes- fish oil    Are you having muscle aches or other side effects that you think could be caused by your cholesterol lowering medication?  No    Hypertension Follow-up      Do you check your blood pressure regularly outside of the clinic? Yes     Are you following a low salt diet? Yes    Are your blood pressures ever more than 140 on the top number (systolic) OR more   than 90 on the bottom number (diastolic), for example 140/90? No    BP Readings from Last 2 Encounters:   11/22/19  128/82   11/20/18 139/90     Hemoglobin A1C (%)   Date Value   11/22/2019 5.5   11/20/2018 5.5     LDL Cholesterol Calculated (mg/dL)   Date Value   11/20/2018 172 (H)   11/17/2017 149 (H)     Wt Readings from Last 4 Encounters:   11/22/19 81.9 kg (180 lb 8 oz)   11/20/18 83.2 kg (183 lb 5 oz)   03/24/18 74.8 kg (165 lb)   11/17/17 76.1 kg (167 lb 12 oz)     SAD Followup    How are you doing with your depression since your last visit? This is a hard month for her    Are you having other symptoms that might be associated with depression? No    Have you had a significant life event?  No     Are you feeling anxious or having panic attacks?   No    Do you have any concerns with your use of alcohol or other drugs? No   No flowsheet data found.        Social History     Tobacco Use     Smoking status: Never Smoker     Smokeless tobacco: Never Used   Substance Use Topics     Alcohol use: Yes     Comment: rare - one glass per week of wine     Drug use: No   No flowsheet data found.      Today's PHQ-2 Score:   PHQ-2 ( 1999 Pfizer) 11/22/2019   Q1: Little interest or pleasure in doing things 0   Q2: Feeling down, depressed or hopeless 0   PHQ-2 Score 0   Q1: Little interest or pleasure in doing things Not at all   Q2: Feeling down, depressed or hopeless Not at all   PHQ-2 Score 0     Abuse: Current or Past(Physical, Sexual or Emotional)- No  Do you feel safe in your environment? Yes    Social History     Tobacco Use     Smoking status: Never Smoker     Smokeless tobacco: Never Used   Substance Use Topics     Alcohol use: Yes     Comment: rare - one glass per week of wine     If you drink alcohol do you typically have >3 drinks per day or >7 drinks per week? No    Alcohol Use 11/22/2019   Prescreen: >3 drinks/day or >7 drinks/week? No   Prescreen: >3 drinks/day or >7 drinks/week? -   No flowsheet data found.    Reviewed orders with patient.  Reviewed health maintenance and updated orders accordingly - Yes  BP Readings from  Last 3 Encounters:   11/22/19 128/82   11/20/18 139/90   03/24/18 124/80    Wt Readings from Last 3 Encounters:   11/22/19 81.9 kg (180 lb 8 oz)   11/20/18 83.2 kg (183 lb 5 oz)   03/24/18 74.8 kg (165 lb)                  Patient Active Problem List   Diagnosis     Family history of colonic polyps     CKD (chronic kidney disease) stage 3, GFR 30-59 ml/min (H)     Perimenopause     Tubular adenoma of colon     Impaired fasting glucose     Essential hypertension with goal blood pressure less than 140/90     Elevated bilirubin     Seasonal affective disorder (H)     Abnormal weight gain     Obesity, Class I, BMI 30-34.9     Hypercholesteremia     Past Surgical History:   Procedure Laterality Date     BIOPSY BREAST      X 2     COLONOSCOPY  6/15/2009    Polyp - Due in 5 years     CYSTOSCOPY, RETROGRADES, MANIPULATE STONE, INSERT STENT, COMBINED  2007     EXCISE PILONIDAL CYST, EXTENSIVE       LASER REVOLIX LITHOTRIPSY URETER(S), INSERT STENT, COMBINED  2007     Sebaceous Cyst Removal  2008    Back       Social History     Tobacco Use     Smoking status: Never Smoker     Smokeless tobacco: Never Used   Substance Use Topics     Alcohol use: Yes     Comment: rare - one glass per week of wine     Family History   Problem Relation Age of Onset     Hypertension Mother      Cerebrovascular Disease Mother         at age 50     Allergies Mother      Arthritis Mother         osteo     Depression Mother      Cancer - colorectal Mother         BENIGN polyps 68 (2007)     Hypertension Father      Allergies Father      Depression Father      Cerebrovascular Disease Father      Heart Disease Father         had heart surgery and a stroke      Cancer - colorectal Maternal Grandmother         at age 50     Alzheimer Disease Maternal Grandmother         diag at age 80     Eye Disorder Maternal Grandmother         cateracts     Osteoporosis Maternal Grandmother      Cancer Maternal Grandfather         smoker     Cancer Paternal  Grandfather         smoker     Hypertension Brother      Depression Sister      Gynecology Sister         cervical cancer at age 30     Cancer Sister         cervical         Current Outpatient Medications   Medication Sig Dispense Refill     ACETAMINOPHEN PO Take 500 mg by mouth.       ACIDOPHILUS OR CAPS 2 caps. daily  0     ciprofloxacin (CIPRO) 500 MG tablet Take 1 tablet (500 mg) by mouth 2 times daily for 3 days 6 tablet 0     DIGEST ENZYMES-ANTICHOLINERGIC OR CAPS 2 capsules daily       FISH OIL None Entered       hydrOXYzine (ATARAX) 25 MG tablet Take 1/2-2 tabs po HS prn itching 30 tablet 6     lisinopril (PRINIVIL/ZESTRIL) 10 MG tablet Take 1 tablet (10 mg) by mouth 2 times daily 180 tablet 3     MAGNESIUM 300 MG OR CAPS None Entered       MINERAL COMPLEX OR None Entered       Nettle, Urtica Dioica, (NETTLE LEAF PO)        order for DME Equipment being ordered: SAD lite box 1 each 0     VITAMIN D OR 1 cap daily in the winter       Allergies   Allergen Reactions     Seasonal Allergies      Vicodin [Hydrocodone-Acetaminophen]      Recent Labs   Lab Test 11/22/19  0817 11/20/18  0927 11/17/17  0907 11/15/16  0853  11/06/13  1001   A1C 5.5 5.5 5.6 5.6   < >  --    LDL  --  172* 149* 136*   < > 138*   HDL  --  41* 53 46*   < > 52   TRIG  --  153* 84 84   < > 99   ALT  --  25 24  --   --  29   CR  --  1.05* 1.11* 1.14*   < > 1.03   GFRESTIMATED  --  54* 51* 49*   < > 56*   GFRESTBLACK  --  65 62 60*   < > 68   POTASSIUM  --  3.9 3.9 3.8   < > 4.1   TSH  --  1.76 1.91  --   --   --     < > = values in this interval not displayed.        Mammogram Screening: Patient over age 50, mutual decision to screen reflected in health maintenance.    Pertinent mammograms are reviewed under the imaging tab.  History of abnormal Pap smear: NO - age 30-65 PAP every 5 years with negative HPV co-testing recommended  PAP / HPV Latest Ref Rng & Units 11/17/2017 11/6/2013 11/5/2012   PAP - NIL NIL ASC-US(A)   HPV 16 DNA NEG:Negative  Negative - -   HPV 18 DNA NEG:Negative Negative - -   OTHER HR HPV NEG:Negative Negative - -     Reviewed and updated as needed this visit by clinical staff  Tobacco  Allergies  Meds  Med Hx  Surg Hx  Fam Hx  Soc Hx        Reviewed and updated as needed this visit by Provider        Past Medical History:   Diagnosis Date     Calculus of kidney      Calculus, kidney      CKD (chronic kidney disease) stage 3, GFR 30-59 ml/min (H)      Hypercholesteremia 2018     Hypertension goal BP (blood pressure) < 140/90 2012     Seasonal allergic rhinitis     seasonal allergies, cats     Tubular adenoma of colon 10/7/2014    Colonoscopy 10/1/2014, repeat 2019     Urinary tract infection, site not specified  to early     Past history of       Past Surgical History:   Procedure Laterality Date     BIOPSY BREAST      X 2     COLONOSCOPY  6/15/2009    Polyp - Due in 5 years     CYSTOSCOPY, RETROGRADES, MANIPULATE STONE, INSERT STENT, COMBINED       EXCISE PILONIDAL CYST, EXTENSIVE       LASER REVOLIX LITHOTRIPSY URETER(S), INSERT STENT, COMBINED       Sebaceous Cyst Removal      Back     OB History    Para Term  AB Living   4 0 0 0 4 0   SAB TAB Ectopic Multiple Live Births   0 4 0 0 0      # Outcome Date GA Lbr Jeffery/2nd Weight Sex Delivery Anes PTL Lv   4 TAB            3 TAB            2 TAB            1 TAB                Review of Systems   Constitutional: Negative for chills and fever.   HENT: Positive for congestion. Negative for ear pain.    Eyes: Negative for pain.   Respiratory: Positive for cough.    Cardiovascular: Negative for chest pain.   Gastrointestinal: Negative for abdominal pain, constipation, diarrhea and hematochezia.   Genitourinary: Negative for hematuria.   Neurological: Negative for dizziness.   Psychiatric/Behavioral: The patient is not nervous/anxious.      Family History   Problem Relation Age of Onset     Hypertension Mother      Cerebrovascular  "Disease Mother         at age 50     Allergies Mother      Arthritis Mother         osteo     Depression Mother      Cancer - colorectal Mother         BENIGN polyps 68 (2007)     Hypertension Father      Allergies Father      Depression Father      Cerebrovascular Disease Father      Heart Disease Father         had heart surgery and a stroke      Cancer - colorectal Maternal Grandmother         at age 50     Alzheimer Disease Maternal Grandmother         diag at age 80     Eye Disorder Maternal Grandmother         cateracts     Osteoporosis Maternal Grandmother      Cancer Maternal Grandfather         smoker     Cancer Paternal Grandfather         smoker     Hypertension Brother      Depression Sister      Gynecology Sister         cervical cancer at age 30     Cancer Sister         cervical        OBJECTIVE:   /82 (BP Location: Left arm, Patient Position: Sitting, Cuff Size: Adult Regular)   Pulse 73   Temp 98.7  F (37.1  C) (Oral)   Resp 16   Ht 1.626 m (5' 4\")   Wt 81.9 kg (180 lb 8 oz)   SpO2 98%   Breastfeeding No   BMI 30.98 kg/m    Physical Exam  GENERAL: healthy, alert and no distress  EYES: Eyes grossly normal to inspection, PERRL and conjunctivae and sclerae normal  HENT: ear canals and TM's normal, nose and mouth without ulcers or lesions  NECK: no adenopathy, no asymmetry, masses, or scars and thyroid normal to palpation  RESP: lungs clear to auscultation - no rales, rhonchi or wheezes  CV: regular rate and rhythm, normal S1 S2, no S3 or S4, no murmur, click or rub, no peripheral edema and peripheral pulses strong  ABDOMEN: soft, nontender, no hepatosplenomegaly, no masses and bowel sounds normal  MS: no gross musculoskeletal defects noted, no edema  SKIN: no suspicious lesions or rashes  NEURO: Normal strength and tone, mentation intact and speech normal  PSYCH: mentation appears normal, affect normal/bright  LYMPH: normal ant/post cervical, supraclavicular nodes    Results for orders " placed or performed in visit on 11/22/19   Hemoglobin A1c     Status: None   Result Value Ref Range    Hemoglobin A1C 5.5 0 - 5.6 %         ASSESSMENT/PLAN:   1. Routine general medical examination at a health care facility  routine  - C RIV4 (FLUBLOK) VACCINE RECOMBINANT DNA PRSRV ANTIBIO FREE, IM [42518]    2. Impaired fasting glucose  stable  - Hemoglobin A1c    3. Essential hypertension with goal blood pressure less than 140/90  BP Readings from Last 3 Encounters:   11/22/19 128/82   11/20/18 139/90   03/24/18 124/80    At goal  The current medical regimen is effective;  continue present plan and medications.    - Comprehensive metabolic panel  - Albumin Random Urine Quantitative with Creat Ratio  - lisinopril (PRINIVIL/ZESTRIL) 10 MG tablet; Take 1 tablet (10 mg) by mouth 2 times daily  Dispense: 180 tablet; Refill: 3    4. CKD (chronic kidney disease) stage 3, GFR 30-59 ml/min (H)  GFR Estimate   Date Value Ref Range Status   11/20/2018 54 (L) >60 mL/min/1.7m2 Final     Comment:     Non  GFR Calc   11/17/2017 51 (L) >60 mL/min/1.7m2 Final     Comment:     Non  GFR Calc   11/15/2016 49 (L) >60 mL/min/1.7m2 Final     Comment:     Non  GFR Calc   stable. Monitor annually. Continue ACE as below.  - lisinopril (PRINIVIL/ZESTRIL) 10 MG tablet; Take 1 tablet (10 mg) by mouth 2 times daily  Dispense: 180 tablet; Refill: 3    5.  Hyperlipidemia LDL goal <130  LDL Cholesterol Calculated   Date Value Ref Range Status   11/20/2018 172 (H) <100 mg/dL Final     Comment:     Above desirable:  100-129 mg/dl  Borderline High:  130-159 mg/dL  High:             160-189 mg/dL  Very high:       >189 mg/dl      not at goal, she does feel she's improved her diet, recheck today and will fu as indicated.   - Comprehensive metabolic panel  - Lipid panel reflex to direct LDL Fasting    6. Hypercholesteremia    7. Seasonal affective disorder (H)  She's using light box, will see if it  "helps    8. Visit for screening mammogram  Reminded to schedule mammog  - Mammo Screening digital (bilat); Future    9. Traveler's diarrhea  She'll be going to [place] for a retreat in March, wants abx to take just in case  I recommended Hep A vaccine, she'll return to clinic for this.  - ciprofloxacin (CIPRO) 500 MG tablet; Take 1 tablet (500 mg) by mouth 2 times daily for 3 days  Dispense: 6 tablet; Refill: 0    COUNSELING:  Reviewed preventive health counseling, as reflected in patient instructions    Estimated body mass index is 30.98 kg/m  as calculated from the following:    Height as of this encounter: 1.626 m (5' 4\").    Weight as of this encounter: 81.9 kg (180 lb 8 oz).    Weight management plan: Discussed healthy diet and exercise guidelines     reports that she has never smoked. She has never used smokeless tobacco.    Counseling Resources:  ATP IV Guidelines  Pooled Cohorts Equation Calculator  Breast Cancer Risk Calculator  FRAX Risk Assessment  ICSI Preventive Guidelines  Dietary Guidelines for Americans, 2010  USDA's MyPlate  ASA Prophylaxis  Lung CA Screening    Monica Lemus MD  [facility]  "

## 2019-11-22 ENCOUNTER — OFFICE VISIT (OUTPATIENT)
Dept: FAMILY MEDICINE | Facility: CLINIC | Age: 59
End: 2019-11-22
Payer: COMMERCIAL

## 2019-11-22 VITALS
HEIGHT: 64 IN | DIASTOLIC BLOOD PRESSURE: 82 MMHG | HEART RATE: 73 BPM | WEIGHT: 180.5 LBS | OXYGEN SATURATION: 98 % | RESPIRATION RATE: 16 BRPM | BODY MASS INDEX: 30.81 KG/M2 | TEMPERATURE: 98.7 F | SYSTOLIC BLOOD PRESSURE: 128 MMHG

## 2019-11-22 DIAGNOSIS — N18.30 CKD (CHRONIC KIDNEY DISEASE) STAGE 3, GFR 30-59 ML/MIN (H): ICD-10-CM

## 2019-11-22 DIAGNOSIS — Z23 NEED FOR PROPHYLACTIC VACCINATION AND INOCULATION AGAINST INFLUENZA: ICD-10-CM

## 2019-11-22 DIAGNOSIS — E78.00 HYPERCHOLESTEREMIA: ICD-10-CM

## 2019-11-22 DIAGNOSIS — A09 TRAVELER'S DIARRHEA: ICD-10-CM

## 2019-11-22 DIAGNOSIS — F33.8 SEASONAL AFFECTIVE DISORDER (H): ICD-10-CM

## 2019-11-22 DIAGNOSIS — E78.5 HYPERLIPIDEMIA LDL GOAL <130: ICD-10-CM

## 2019-11-22 DIAGNOSIS — I10 ESSENTIAL HYPERTENSION WITH GOAL BLOOD PRESSURE LESS THAN 140/90: ICD-10-CM

## 2019-11-22 DIAGNOSIS — R73.01 IMPAIRED FASTING GLUCOSE: ICD-10-CM

## 2019-11-22 DIAGNOSIS — Z00.00 ROUTINE GENERAL MEDICAL EXAMINATION AT A HEALTH CARE FACILITY: Primary | ICD-10-CM

## 2019-11-22 DIAGNOSIS — Z12.31 VISIT FOR SCREENING MAMMOGRAM: ICD-10-CM

## 2019-11-22 LAB
ALBUMIN SERPL-MCNC: 4.3 G/DL (ref 3.4–5)
ALP SERPL-CCNC: 51 U/L (ref 40–150)
ALT SERPL W P-5'-P-CCNC: 28 U/L (ref 0–50)
ANION GAP SERPL CALCULATED.3IONS-SCNC: 6 MMOL/L (ref 3–14)
AST SERPL W P-5'-P-CCNC: 10 U/L (ref 0–45)
BILIRUB SERPL-MCNC: 1.5 MG/DL (ref 0.2–1.3)
BUN SERPL-MCNC: 18 MG/DL (ref 7–30)
CALCIUM SERPL-MCNC: 9.4 MG/DL (ref 8.5–10.1)
CHLORIDE SERPL-SCNC: 106 MMOL/L (ref 94–109)
CHOLEST SERPL-MCNC: 211 MG/DL
CO2 SERPL-SCNC: 25 MMOL/L (ref 20–32)
CREAT SERPL-MCNC: 1.15 MG/DL (ref 0.52–1.04)
CREAT UR-MCNC: 103 MG/DL
GFR SERPL CREATININE-BSD FRML MDRD: 52 ML/MIN/{1.73_M2}
GLUCOSE SERPL-MCNC: 104 MG/DL (ref 70–99)
HBA1C MFR BLD: 5.5 % (ref 0–5.6)
HDLC SERPL-MCNC: 38 MG/DL
LDLC SERPL CALC-MCNC: 145 MG/DL
MICROALBUMIN UR-MCNC: 9 MG/L
MICROALBUMIN/CREAT UR: 8.85 MG/G CR (ref 0–25)
NONHDLC SERPL-MCNC: 173 MG/DL
POTASSIUM SERPL-SCNC: 4.1 MMOL/L (ref 3.4–5.3)
PROT SERPL-MCNC: 7 G/DL (ref 6.8–8.8)
SODIUM SERPL-SCNC: 137 MMOL/L (ref 133–144)
TRIGL SERPL-MCNC: 142 MG/DL

## 2019-11-22 PROCEDURE — 99396 PREV VISIT EST AGE 40-64: CPT | Performed by: FAMILY MEDICINE

## 2019-11-22 PROCEDURE — 82043 UR ALBUMIN QUANTITATIVE: CPT | Performed by: FAMILY MEDICINE

## 2019-11-22 PROCEDURE — 83036 HEMOGLOBIN GLYCOSYLATED A1C: CPT | Performed by: FAMILY MEDICINE

## 2019-11-22 PROCEDURE — 80061 LIPID PANEL: CPT | Performed by: FAMILY MEDICINE

## 2019-11-22 PROCEDURE — 99214 OFFICE O/P EST MOD 30 MIN: CPT | Mod: 25 | Performed by: FAMILY MEDICINE

## 2019-11-22 PROCEDURE — 80053 COMPREHEN METABOLIC PANEL: CPT | Performed by: FAMILY MEDICINE

## 2019-11-22 PROCEDURE — 36415 COLL VENOUS BLD VENIPUNCTURE: CPT | Performed by: FAMILY MEDICINE

## 2019-11-22 RX ORDER — LISINOPRIL 10 MG/1
10 TABLET ORAL 2 TIMES DAILY
Qty: 180 TABLET | Refills: 3 | Status: SHIPPED | OUTPATIENT
Start: 2019-11-22 | End: 2020-11-16

## 2019-11-22 RX ORDER — CIPROFLOXACIN 500 MG/1
500 TABLET, FILM COATED ORAL 2 TIMES DAILY
Qty: 6 TABLET | Refills: 0 | Status: SHIPPED | OUTPATIENT
Start: 2019-11-22 | End: 2022-10-13

## 2019-11-22 ASSESSMENT — ENCOUNTER SYMPTOMS
CONSTIPATION: 0
NERVOUS/ANXIOUS: 0
ABDOMINAL PAIN: 0
DIARRHEA: 0
CHILLS: 0
DIZZINESS: 0
HEMATOCHEZIA: 0
FEVER: 0
COUGH: 1
EYE PAIN: 0
HEMATURIA: 0

## 2019-11-22 ASSESSMENT — MIFFLIN-ST. JEOR: SCORE: 1388.74

## 2019-11-22 ASSESSMENT — PATIENT HEALTH QUESTIONNAIRE - PHQ9: SUM OF ALL RESPONSES TO PHQ QUESTIONS 1-9: 0

## 2019-11-22 NOTE — RESULT ENCOUNTER NOTE
Patient was seen today in clinic.  I discussed results in clinic, please see clinic progress note.    Monica Lemus MD 11/22/2019

## 2019-11-26 NOTE — RESULT ENCOUNTER NOTE
Hello!  It was a pleasure to see you in clinic!  Thank you for getting labs done.     Your microalbumen is normal, which is great news. This means you do not have protein in the urine, and that your kidneys are currently functioning well. Keeping blood pressure and blood fats low is the best way to preserve your kidney function over your lifetime.     The testing of your blood sugar, liver function and electrolytes was normal.  Kidney function is assessed by blood work that measures creatinine and calculates estimated GFR (glomerular filtration rate).  By current criteria you do have stage 3 chronic kidney disease as your eGFR is < 60.  This is not something that is urgent as your value has not changed much with time.    However, it does mean will monitor your labs (urine and blood tests) every 6-12 months and we will keep trying to make sure your blood pressure and cholesterol are at goal to keep your kidneys as healthy as possible.     Your HgA1C, also called glycosylated hemoglobin, which measures the level of sugar in your blood over the past few months, is normal, you do not have diabetes or prediabetes.    Thank you for getting your cholesterol checked!  Desired or goal levels are:  CHOLESTEROL: Desirable is less than 200.  HDL (Good Cholesterol): Desirable is greater than 40 in men and greater than 50 in women.  LDL (Bad Cholesterol): Desirable is less than 130  TRIGLYCERIDES: Desirable is less than 150.      Consider starting or increasing your aerobic activity; this is the best way to improve HDL (good) cholesterol. Exercise for at least 30 min 5 times per week, and lift weights 2-3 times per week.    As you may know, abnormal cholesterol is one factor that increases your risk for heart disease and stroke. You can improve your cholesterol by controlling the amount and type of fat you eat and by increasing your daily activity level.    Here are some ways to improve your nutrition (adapted from the American  [employer] handout):  Eat less fat (especially butter, Crisco and other saturated fats)  Buy lean cuts of meat; reduce your portions of red meat or substitute poultry or fish, or avoid meat altogether.  Use skim milk and low-fat dairy products  Eat no more than 4 egg yolks per week  Avoid fried or fast foods that are high in fat  Eat more fruits and vegetables, trying to make your plate of food at least half non-starchy vegetables.    If you have any questions, please contact the clinic or schedule an appointment with me, thank you!    Sincerely,  Dr. Monica Lemus MD  11/26/2019

## 2019-12-10 ENCOUNTER — ANCILLARY PROCEDURE (OUTPATIENT)
Dept: MAMMOGRAPHY | Facility: CLINIC | Age: 59
End: 2019-12-10
Attending: FAMILY MEDICINE
Payer: COMMERCIAL

## 2019-12-10 DIAGNOSIS — Z12.31 VISIT FOR SCREENING MAMMOGRAM: ICD-10-CM

## 2019-12-10 PROCEDURE — 77067 SCR MAMMO BI INCL CAD: CPT

## 2020-11-16 ENCOUNTER — TELEPHONE (OUTPATIENT)
Dept: FAMILY MEDICINE | Facility: CLINIC | Age: 60
End: 2020-11-16

## 2020-11-16 DIAGNOSIS — N18.30 CKD (CHRONIC KIDNEY DISEASE) STAGE 3, GFR 30-59 ML/MIN (H): ICD-10-CM

## 2020-11-16 DIAGNOSIS — I10 ESSENTIAL HYPERTENSION WITH GOAL BLOOD PRESSURE LESS THAN 140/90: ICD-10-CM

## 2020-11-16 RX ORDER — LISINOPRIL 10 MG/1
10 TABLET ORAL 2 TIMES DAILY
Qty: 60 TABLET | Refills: 0 | Status: SHIPPED | OUTPATIENT
Start: 2020-11-16 | End: 2020-12-15

## 2020-11-16 NOTE — TELEPHONE ENCOUNTER
Reason for Call:  Other prescription    Detailed comments: patient needs a refill for:    lisinopril (PRINIVIL/ZESTRIL) 10 MG tablet    [facility] [place], [place]    Patient has a scheduled Physical with Dr. Lemus on 4-, so please refill today ASAP.    Patient has home BP kit and her pressure is fine.    Call patient when completed, thank you.      Phone Number Patient can be reached at: Home number on file [phone] (home)    Best Time: asap    Can we leave a detailed message on this number? YES    Call taken on 11/16/2020 at 11:35 AM by Shawna James

## 2020-11-16 NOTE — TELEPHONE ENCOUNTER
Medication is being filled for 1 time refill only due to:  Patient needs to be seen because it has been more than one year since last visit. Please assist with an appointment-Can be virtual. Bia Draper RN

## 2020-12-15 ENCOUNTER — VIRTUAL VISIT (OUTPATIENT)
Dept: FAMILY MEDICINE | Facility: CLINIC | Age: 60
End: 2020-12-15
Payer: COMMERCIAL

## 2020-12-15 DIAGNOSIS — I10 ESSENTIAL HYPERTENSION WITH GOAL BLOOD PRESSURE LESS THAN 140/90: ICD-10-CM

## 2020-12-15 DIAGNOSIS — R73.01 IMPAIRED FASTING GLUCOSE: ICD-10-CM

## 2020-12-15 DIAGNOSIS — N18.31 STAGE 3A CHRONIC KIDNEY DISEASE (H): Primary | ICD-10-CM

## 2020-12-15 DIAGNOSIS — E78.00 HYPERCHOLESTEREMIA: ICD-10-CM

## 2020-12-15 DIAGNOSIS — E78.5 HYPERLIPIDEMIA LDL GOAL <130: ICD-10-CM

## 2020-12-15 DIAGNOSIS — Z67.10 BLOOD TYPE A+: ICD-10-CM

## 2020-12-15 PROCEDURE — 99214 OFFICE O/P EST MOD 30 MIN: CPT | Mod: 95 | Performed by: FAMILY MEDICINE

## 2020-12-15 RX ORDER — LISINOPRIL 10 MG/1
10 TABLET ORAL 2 TIMES DAILY
Qty: 180 TABLET | Refills: 3 | Status: SHIPPED | OUTPATIENT
Start: 2020-12-15 | End: 2021-04-05

## 2020-12-15 NOTE — PROGRESS NOTES
"Franchesca Hyatt is a 60 year old female who is being evaluated via a billable telephone visit.      The patient has been notified of following:     \"This telephone visit will be conducted via a call between you and your physician/provider. We have found that certain health care needs can be provided without the need for a physical exam.  This service lets us provide the care you need with a short phone conversation.  If a prescription is necessary we can send it directly to your pharmacy.  If lab work is needed we can place an order for that and you can then stop by our lab to have the test done at a later time.    Telephone visits are billed at different rates depending on your insurance coverage. During this emergency period, for some insurers they may be billed the same as an in-person visit.  Please reach out to your insurance provider with any questions.    If during the course of the call the physician/provider feels a telephone visit is not appropriate, you will not be charged for this service.\"    Patient has given verbal consent for Telephone visit?  Yes    What phone number would you like to be contacted at? [phone] (H)     How would you like to obtain your AVS? Nata Piña     Franchesca Hyatt is a 60 year old female who presents via phone visit today for the following health issues:    HPI      Hypertension Follow-up      Do you check your blood pressure regularly outside of the clinic? Yes     Are you following a low salt diet? Yes    Are your blood pressures ever more than 140 on the top number (systolic) OR more   than 90 on the bottom number (diastolic), for example 140/90? No      How many servings of fruits and vegetables do you eat daily?  4 or more    On average, how many sweetened beverages do you drink each day (Examples: soda, juice, sweet tea, etc.  Do NOT count diet or artificially sweetened beverages)?   0    How many days per week do you exercise enough to make your heart " beat faster? 7    How many minutes a day do you exercise enough to make your heart beat faster? 20 - 29    How many days per week do you miss taking your medication? 0  PHQ-2 Score:     PHQ-2 ( 1999 Pfizer) 11/22/2019 11/20/2018   Q1: Little interest or pleasure in doing things 0 0   Q2: Feeling down, depressed or hopeless 0 0   PHQ-2 Score 0 0   Q1: Little interest or pleasure in doing things Not at all Not at all   Q2: Feeling down, depressed or hopeless Not at all Not at all   PHQ-2 Score 0 0        Allergies   Allergen Reactions     Seasonal Allergies      Vicodin [Hydrocodone-Acetaminophen]      Past Medical History:   Diagnosis Date     Calculus of kidney      Calculus, kidney 2007     CKD (chronic kidney disease) stage 3, GFR 30-59 ml/min (H)      Hypercholesteremia 11/29/2018     Hypertension goal BP (blood pressure) < 140/90 11/5/2012     Seasonal allergic rhinitis     seasonal allergies, cats     Tubular adenoma of colon 10/7/2014    Colonoscopy 10/1/2014, repeat 2019     Urinary tract infection, site not specified 1980 to early '90's    Past history of      Past Surgical History:   Procedure Laterality Date     BIOPSY BREAST      X 2     COLONOSCOPY  6/15/2009    Polyp - Due in 5 years     CYSTOSCOPY, RETROGRADES, MANIPULATE STONE, INSERT STENT, COMBINED  2007     EXCISE PILONIDAL CYST, EXTENSIVE       LASER REVOLIX LITHOTRIPSY URETER(S), INSERT STENT, COMBINED  2007     Sebaceous Cyst Removal  2008    Back     Review of Systems   Constitutional, HEENT, cardiovascular, pulmonary, GI, , musculoskeletal, neuro, skin, endocrine and psych systems are negative, except as otherwise noted.      PHQ-2 Score:     PHQ-2 ( 1999 Pfizer) 11/22/2019 11/20/2018   Q1: Little interest or pleasure in doing things 0 0   Q2: Feeling down, depressed or hopeless 0 0   PHQ-2 Score 0 0   Q1: Little interest or pleasure in doing things Not at all Not at all   Q2: Feeling down, depressed or hopeless Not at all Not at all   PHQ-2  Score 0 0       Objective          Vitals:  No vitals were obtained today due to virtual visit.    healthy, alert and no distress  PSYCH: Alert and oriented times 3; coherent speech, normal   rate and volume, able to articulate logical thoughts, able   to abstract reason, no tangential thoughts, no hallucinations   or delusions  Her affect is normal  RESP: No cough, no audible wheezing, able to talk in full sentences  Remainder of exam unable to be completed due to telephone visits          Assessment/Plan:    Assessment & Plan     Stage 3a chronic kidney disease  stable  - Albumin Random Urine Quantitative with Creat Ratio; Future  - Comprehensive metabolic panel; Future    Impaired fasting glucose  Annual screen  - Hemoglobin A1c; Future  - Comprehensive metabolic panel; Future    Hyperlipidemia LDL goal <130  LDL Cholesterol Calculated   Date Value Ref Range Status   11/22/2019 145 (H) <100 mg/dL Final     Comment:     Above desirable:  100-129 mg/dl  Borderline High:  130-159 mg/dL  High:             160-189 mg/dL  Very high:       >189 mg/dl         - Lipid panel reflex to direct LDL Fasting; Future  - Comprehensive metabolic panel; Future    Hypercholesteremia  - Lipid panel reflex to direct LDL Fasting; Future    Essential hypertension with goal blood pressure less than 140/90  BP Readings from Last 3 Encounters:   11/22/19 128/82   11/20/18 139/90   03/24/18 124/80       CKD (chronic kidney disease) stage 3, GFR 30-59 ml/min  GFR Estimate   Date Value Ref Range Status   11/22/2019 52 (L) >60 mL/min/[1.73_m2] Final     Comment:     Non  GFR Calc  Starting 12/18/2018, serum creatinine based estimated GFR (eGFR) will be   calculated using the Chronic Kidney Disease Epidemiology Collaboration   (CKD-EPI) equation.     11/20/2018 54 (L) >60 mL/min/1.7m2 Final     Comment:     Non  GFR Calc   11/17/2017 51 (L) >60 mL/min/1.7m2 Final     Comment:     Non  GFR Calc            Return in about 4 months (around 4/15/2021) for preventive visit (wellness/annual physical exam).    Monica Lemus MD  [facility]    Phone call duration:  8 minutes

## 2021-03-29 ASSESSMENT — ENCOUNTER SYMPTOMS
DYSURIA: 0
CONSTIPATION: 0
HEADACHES: 0
EYE PAIN: 0
PARESTHESIAS: 0
JOINT SWELLING: 0
FEVER: 0
COUGH: 0
HEMATURIA: 0
HEMATOCHEZIA: 0
MYALGIAS: 0
ABDOMINAL PAIN: 0
PALPITATIONS: 1
SHORTNESS OF BREATH: 0
HEARTBURN: 0
ARTHRALGIAS: 1
FREQUENCY: 0
WEAKNESS: 0
SORE THROAT: 0
NAUSEA: 0
BREAST MASS: 0
NERVOUS/ANXIOUS: 0
DIZZINESS: 0
CHILLS: 0
DIARRHEA: 0

## 2021-03-31 ENCOUNTER — MYC MEDICAL ADVICE (OUTPATIENT)
Dept: FAMILY MEDICINE | Facility: CLINIC | Age: 61
End: 2021-03-31

## 2021-04-01 ENCOUNTER — OFFICE VISIT (OUTPATIENT)
Dept: FAMILY MEDICINE | Facility: CLINIC | Age: 61
End: 2021-04-01
Payer: COMMERCIAL

## 2021-04-01 VITALS
OXYGEN SATURATION: 98 % | WEIGHT: 193 LBS | BODY MASS INDEX: 32.95 KG/M2 | SYSTOLIC BLOOD PRESSURE: 138 MMHG | DIASTOLIC BLOOD PRESSURE: 86 MMHG | HEART RATE: 85 BPM | RESPIRATION RATE: 18 BRPM | HEIGHT: 64 IN | TEMPERATURE: 98.3 F

## 2021-04-01 DIAGNOSIS — R73.01 IMPAIRED FASTING GLUCOSE: ICD-10-CM

## 2021-04-01 DIAGNOSIS — E78.5 HYPERLIPIDEMIA LDL GOAL <130: ICD-10-CM

## 2021-04-01 DIAGNOSIS — L40.9 PSORIASIS: ICD-10-CM

## 2021-04-01 DIAGNOSIS — R17 ELEVATED BILIRUBIN: ICD-10-CM

## 2021-04-01 DIAGNOSIS — Z00.00 ROUTINE GENERAL MEDICAL EXAMINATION AT A HEALTH CARE FACILITY: Primary | ICD-10-CM

## 2021-04-01 DIAGNOSIS — R63.5 ABNORMAL WEIGHT GAIN: ICD-10-CM

## 2021-04-01 DIAGNOSIS — I10 ESSENTIAL HYPERTENSION WITH GOAL BLOOD PRESSURE LESS THAN 140/90: ICD-10-CM

## 2021-04-01 DIAGNOSIS — F33.8 SEASONAL AFFECTIVE DISORDER (H): ICD-10-CM

## 2021-04-01 DIAGNOSIS — N18.31 STAGE 3A CHRONIC KIDNEY DISEASE (H): ICD-10-CM

## 2021-04-01 PROBLEM — R03.0 ELEVATED BLOOD PRESSURE READING WITHOUT DIAGNOSIS OF HYPERTENSION: Status: ACTIVE | Noted: 2021-04-01

## 2021-04-01 LAB
ALBUMIN SERPL-MCNC: 4 G/DL (ref 3.4–5)
ALP SERPL-CCNC: 51 U/L (ref 40–150)
ALT SERPL W P-5'-P-CCNC: 31 U/L (ref 0–50)
ANION GAP SERPL CALCULATED.3IONS-SCNC: 2 MMOL/L (ref 3–14)
AST SERPL W P-5'-P-CCNC: 16 U/L (ref 0–45)
BILIRUB SERPL-MCNC: 1.1 MG/DL (ref 0.2–1.3)
BUN SERPL-MCNC: 16 MG/DL (ref 7–30)
CALCIUM SERPL-MCNC: 9.6 MG/DL (ref 8.5–10.1)
CHLORIDE SERPL-SCNC: 107 MMOL/L (ref 94–109)
CHOLEST SERPL-MCNC: 215 MG/DL
CO2 SERPL-SCNC: 28 MMOL/L (ref 20–32)
CREAT SERPL-MCNC: 1.09 MG/DL (ref 0.52–1.04)
CREAT UR-MCNC: 107 MG/DL
GFR SERPL CREATININE-BSD FRML MDRD: 55 ML/MIN/{1.73_M2}
GLUCOSE SERPL-MCNC: 106 MG/DL (ref 70–99)
HBA1C MFR BLD: 6 % (ref 0–5.6)
HDLC SERPL-MCNC: 40 MG/DL
LDLC SERPL CALC-MCNC: 146 MG/DL
MICROALBUMIN UR-MCNC: 10 MG/L
MICROALBUMIN/CREAT UR: 9.53 MG/G CR (ref 0–25)
NONHDLC SERPL-MCNC: 175 MG/DL
POTASSIUM SERPL-SCNC: 4 MMOL/L (ref 3.4–5.3)
PROT SERPL-MCNC: 7.2 G/DL (ref 6.8–8.8)
SODIUM SERPL-SCNC: 137 MMOL/L (ref 133–144)
TRIGL SERPL-MCNC: 147 MG/DL
TSH SERPL DL<=0.005 MIU/L-ACNC: 3.08 MU/L (ref 0.4–4)

## 2021-04-01 PROCEDURE — 99396 PREV VISIT EST AGE 40-64: CPT | Performed by: FAMILY MEDICINE

## 2021-04-01 PROCEDURE — 99214 OFFICE O/P EST MOD 30 MIN: CPT | Mod: 25 | Performed by: FAMILY MEDICINE

## 2021-04-01 PROCEDURE — 36415 COLL VENOUS BLD VENIPUNCTURE: CPT | Performed by: FAMILY MEDICINE

## 2021-04-01 PROCEDURE — 84443 ASSAY THYROID STIM HORMONE: CPT | Performed by: FAMILY MEDICINE

## 2021-04-01 PROCEDURE — 82043 UR ALBUMIN QUANTITATIVE: CPT | Performed by: FAMILY MEDICINE

## 2021-04-01 PROCEDURE — 83036 HEMOGLOBIN GLYCOSYLATED A1C: CPT | Performed by: FAMILY MEDICINE

## 2021-04-01 PROCEDURE — 80061 LIPID PANEL: CPT | Performed by: FAMILY MEDICINE

## 2021-04-01 PROCEDURE — 80053 COMPREHEN METABOLIC PANEL: CPT | Performed by: FAMILY MEDICINE

## 2021-04-01 RX ORDER — HYDROXYZINE HYDROCHLORIDE 25 MG/1
TABLET, FILM COATED ORAL
Qty: 30 TABLET | Refills: 6 | Status: SHIPPED | OUTPATIENT
Start: 2021-04-01 | End: 2022-08-24

## 2021-04-01 ASSESSMENT — ENCOUNTER SYMPTOMS
DYSURIA: 0
MYALGIAS: 0
HEARTBURN: 0
DIARRHEA: 0
SHORTNESS OF BREATH: 0
DIZZINESS: 0
NERVOUS/ANXIOUS: 0
FREQUENCY: 0
CHILLS: 0
HEMATOCHEZIA: 0
COUGH: 0
NAUSEA: 0
JOINT SWELLING: 0
PARESTHESIAS: 0
EYE PAIN: 0
BREAST MASS: 0
ARTHRALGIAS: 1
HEADACHES: 0
SORE THROAT: 0
WEAKNESS: 0
FEVER: 0
ABDOMINAL PAIN: 0
CONSTIPATION: 0
HEMATURIA: 0
PALPITATIONS: 1

## 2021-04-01 ASSESSMENT — MIFFLIN-ST. JEOR: SCORE: 1435.44

## 2021-04-01 NOTE — PATIENT INSTRUCTIONS
Weight Loss  Intermittent fasting, allowing at least 12 hours between dinner and breakfast, or even 16 hours or more can also help lower your total and LDL cholesterol as well as your triglycerides. The goal is to try to push all your daily calories into a window between say 8 am and 3 pm, and really try to reduce calories consumed in the afternoon and evening, aiming for not eating at all right before bed.     Try to cut out or reduce alcohol, alcohol is a simple sugar and very calorie dense.    Nuts are healthy but watch the portion size, they do have a lot of calories.    Great job walking regularly!!    Shingles vaccine  I strongly recommend getting the shingles vaccine (Shingrix) if you are over age 50, but please check with your insurance to see how much you might have to pay for this vaccine! If you are on most insurance plans including Medicare, it's covered if you get it at a pharmacy but not in a clinic.    This shot will prevent shingles, a painful skin rash that you are at risk for getting if you have ever had chicken pox. Sometimes the pain will last the rest of your life, even after the rash has healed, and there is not much that we can do to relieve the pain. The vaccine is 98% effective at preventing shingles.    Please consider getting this vaccine! You'll need #2 vaccines 2-4 months apart to be protected.        Preventive Health Recommendations  Female Ages 50 - 64    Yearly exam: See your health care provider every year in order to  o Review health changes.   o Discuss preventive care.    o Review your medicines if your doctor has prescribed any.      Get a Pap test every three years (unless you have an abnormal result and your provider advises testing more often).    If you get Pap tests with HPV test, you only need to test every 5 years, unless you have an abnormal result.     You do not need a Pap test if your uterus was removed (hysterectomy) and you have not had cancer.    You should be  tested each year for STDs (sexually transmitted diseases) if you're at risk.     Have a mammogram every 1 to 2 years.    Have a colonoscopy at age 50, or have a yearly FIT test (stool test). These exams screen for colon cancer.      Have a cholesterol test every 5 years, or more often if advised.    Have a diabetes test (fasting glucose) every three years. If you are at risk for diabetes, you should have this test more often.     If you are at risk for osteoporosis (brittle bone disease), think about having a bone density scan (DEXA).    Shots: Get a flu shot each year. Get a tetanus shot every 10 years.    Nutrition:     Eat at least 5 servings of fruits and vegetables each day.    Eat whole-grain bread, whole-wheat pasta and brown rice instead of white grains and rice.    Get adequate Calcium and Vitamin D.     Lifestyle    Exercise at least 150 minutes a week (30 minutes a day, 5 days a week). This will help you control your weight and prevent disease.    Limit alcohol to one drink per day.    No smoking.     Wear sunscreen to prevent skin cancer.     See your dentist every six months for an exam and cleaning.    See your eye doctor every 1 to 2 years.
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
## 2021-04-02 NOTE — RESULT ENCOUNTER NOTE
Hello!  It was a pleasure to see you in clinic!  Thank you for getting labs done.    Your microalbumen is normal, which is great news. This means you do not have protein in the urine, and that your kidneys are currently functioning well. Keeping blood pressure and blood fats low is the best way to preserve your kidney function over your lifetime.     The testing of your blood sugar, liver function and electrolytes was normal.  Kidney function is assessed by blood work that measures creatinine and calculates estimated GFR (glomerular filtration rate).  By current criteria you do have stage 3 chronic kidney disease as your eGFR is < 60.  This is not something that is urgent as your value has not changed much with time.    However, it does mean will monitor your labs (urine and blood tests) every 6-12 months and we will keep trying to make sure your blood pressure and cholesterol are at goal to keep your kidneys as healthy as possible.     Your lab test to check for diabetes, HgA1C, also called glycosylated hemoglobin, which measures the level of sugar in your blood over the past few months, is slightly higher than normal but less than 6.5. This is good news, it means you don't have diabetes right now!  However, it does mean that you're at risk for developing diabetes in the future. Try to get exercise every day and reduce carbohydrates in your diet, especially simple sugars (fruit juice, alcohol and sweets) to reduce your blood sugar.    Your total cholesterol is a little high and your HDL is a little too low.    Desired or goal levels are:  CHOLESTEROL: Desirable is less than 200.  HDL (Good Cholesterol): Desirable is greater than 40 in men and greater than 50 in women.  LDL (Bad Cholesterol): Desirable is less than 130 or less than 100 in patients with diabetes or vascular disease. For some patients with diabetes or vascular disease, the desireable LDL is less that 70.  TRIGLYCERIDES: Desirable is less than  150.    As you may know, abnormal cholesterol is one factor that increases your risk for heart disease and stroke. You can improve your cholesterol by controlling the amount and type of fat you eat and by increasing your daily activity level.    Here are some ways to improve your nutrition (adapted from the American Academy of Family Practice handout):  Eat less fat (especially butter, Crisco and other saturated fats)  Buy lean cuts of meat; reduce your portions of red meat or substitute poultry or fish  Use skim milk and low-fat dairy products  Eat no more than 4 egg yolks per week  Avoid fried or fast foods that are high in fat  Eat more fruits and vegetables    Also consider starting or increasing your aerobic activity; this is the best way to improve HDL (good) cholesterol. Exercise for at least 30 min 5 times per week, and lift weights 2-3 times per week.     If you have any questions, please contact the clinic or schedule an appointment with me, thank you!    Sincerely,  Dr. Monica Lemus MD  4/1/2021

## 2021-04-05 ENCOUNTER — TELEPHONE (OUTPATIENT)
Dept: FAMILY MEDICINE | Facility: CLINIC | Age: 61
End: 2021-04-05

## 2021-04-05 DIAGNOSIS — N18.31 STAGE 3A CHRONIC KIDNEY DISEASE (H): ICD-10-CM

## 2021-04-05 DIAGNOSIS — I10 ESSENTIAL HYPERTENSION WITH GOAL BLOOD PRESSURE LESS THAN 140/90: ICD-10-CM

## 2021-04-05 RX ORDER — LISINOPRIL 10 MG/1
10 TABLET ORAL 2 TIMES DAILY
Qty: 180 TABLET | Refills: 1 | Status: SHIPPED | OUTPATIENT
Start: 2021-04-05 | End: 2021-04-05

## 2021-04-05 RX ORDER — LISINOPRIL 10 MG/1
10 TABLET ORAL 2 TIMES DAILY
Qty: 180 TABLET | Refills: 3 | Status: SHIPPED | OUTPATIENT
Start: 2021-04-05 | End: 2022-06-09

## 2021-04-05 NOTE — TELEPHONE ENCOUNTER
PT is needing lisinopril refill resent to [facility]'s pharmacy.  If first sent in a 6  Month supply. PT just had physical.  Resent a one year rx per pt request.  AYANNA Vasquez

## 2021-04-06 ENCOUNTER — IMMUNIZATION (OUTPATIENT)
Dept: PEDIATRICS | Facility: CLINIC | Age: 61
End: 2021-04-06
Payer: COMMERCIAL

## 2021-04-06 PROCEDURE — 0001A PR COVID VAC PFIZER DIL RECON 30 MCG/0.3 ML IM: CPT

## 2021-04-06 PROCEDURE — 91300 PR COVID VAC PFIZER DIL RECON 30 MCG/0.3 ML IM: CPT

## 2021-04-27 ENCOUNTER — IMMUNIZATION (OUTPATIENT)
Dept: PEDIATRICS | Facility: CLINIC | Age: 61
End: 2021-04-27
Attending: INTERNAL MEDICINE
Payer: COMMERCIAL

## 2021-04-27 PROCEDURE — 91300 PR COVID VAC PFIZER DIL RECON 30 MCG/0.3 ML IM: CPT

## 2021-04-27 PROCEDURE — 0002A PR COVID VAC PFIZER DIL RECON 30 MCG/0.3 ML IM: CPT

## 2021-05-10 ENCOUNTER — TELEPHONE (OUTPATIENT)
Dept: FAMILY MEDICINE | Facility: CLINIC | Age: 61
End: 2021-05-10

## 2021-05-10 NOTE — TELEPHONE ENCOUNTER
Reason for Call:  Other referral    Detailed comments: Patient is requesting a PT referral. Please assist. For Dayanna HUMPHREYS. Thanks! Please notify patient.    Phone Number Patient can be reached at: Cell number on file:    Telephone Information:   Mobile [phone]     Best Time: Any    Can we leave a detailed message on this number? YES    Call taken on 5/10/2021 at 2:42 PM by Angelica Alamo

## 2021-05-11 NOTE — TELEPHONE ENCOUNTER
ANDERS.  What is the reason for referral?    Pended the JULIANN referral.    Ok to call clinic or send a mychart with the reason.  AYANNA Vasquez

## 2021-05-12 ENCOUNTER — MYC MEDICAL ADVICE (OUTPATIENT)
Dept: FAMILY MEDICINE | Facility: CLINIC | Age: 61
End: 2021-05-12

## 2021-05-12 DIAGNOSIS — M25.579 ANKLE PAIN: ICD-10-CM

## 2021-05-12 DIAGNOSIS — M25.571 PAIN IN JOINT, ANKLE AND FOOT, RIGHT: ICD-10-CM

## 2021-05-12 DIAGNOSIS — M25.562 ACUTE PAIN OF LEFT KNEE: ICD-10-CM

## 2021-05-12 DIAGNOSIS — M25.569 KNEE PAIN: Primary | ICD-10-CM

## 2021-05-14 ENCOUNTER — THERAPY VISIT (OUTPATIENT)
Dept: PHYSICAL THERAPY | Facility: CLINIC | Age: 61
End: 2021-05-14
Attending: FAMILY MEDICINE
Payer: COMMERCIAL

## 2021-05-14 DIAGNOSIS — M25.571 ACUTE RIGHT ANKLE PAIN: ICD-10-CM

## 2021-05-14 PROCEDURE — 97110 THERAPEUTIC EXERCISES: CPT | Mod: GP | Performed by: PHYSICAL THERAPIST

## 2021-05-14 PROCEDURE — 97161 PT EVAL LOW COMPLEX 20 MIN: CPT | Mod: GP | Performed by: PHYSICAL THERAPIST

## 2021-05-14 NOTE — PROGRESS NOTES
Physical Therapy Initial Evaluation  Subjective:  The history is provided by the patient. No  was used.   Patient Health History  Franchesca Hyatt being seen for R ankle, L knee, low back pain.     Date of Onset: 5/14/21.   Problem occurred: fell on a dip i the sidewalk on 4/14/21   Pain is reported as 2/10 on pain scale.  General health as reported by patient is good.  Health conditions: chronic kidney disease.   Red flags:  None as reported by patient.             Current occupation is teaching meditation work .   Primary job tasks include:  Prolonged sitting.                  Therapist Generated HPI Evaluation  Problem details: The pt presents today with left knee, right ankle, and low back pain. She has been trying to lose weight and walking every day since November. She has low back pain and will regularly see a chiropractor. Pt had a fall on 4/14/21 where she rolled her right ankle in a pot hole and hit the front of her left knee. Since then she has not been going on her daily 3 mile walks. The pt started doing some gentle yoga this past week. The pt has been able to bike without pain. Walking tends to make her pain worse on the outside of her right ankle and low back. Her left knee has been clicking/cathcing.     Goals: get back to walking, gardening and exercising without pain.    Activity: cat/cow, downward dog, biking.         Affected Side: outside of right ankle and low back     This is a new condition.  Condition occurred with:  A fall/slip.  Where condition occurred: in the community.  Site of Pain: outside of right ankle low back.  Pain quality: dull. and is intermittent.  Pain is worse in the A.M..  Since onset symptoms are gradually improving.  Associated symptoms:  Catching (left knee). Symptoms are exacerbated by walking and activity (gardening)  and relieved by rest and ice (elevating legs, CBD cream).      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  Barriers include:  None as reported by patient (someone that helps her with gardening).                        Objective:  Standing Alignment:    Cervical/Thoracic:  Forward head                Gait:  Slight decreased stance time on right LE    Gait Type:  Antalgic               Ankle/Foot Evaluation  ROM:  AROM is normal.      Strength:        Inversion:Left: 5/5  Pain:     Right: 5-/5  Pain:  Eversion:Left: 5/5  Pain:  Right: 5-/5  Pain:                  LIGAMENT TESTING:   Anterior Drawer (ATF) Right: pos              PALPATION:     Right ankle tenderness present at:   lateral malleolus           Lumbar/SI Evaluation  ROM:    AROM Lumbar:   Flexion:        Finger tips to ground  Ext:                    70%   Side Bend:        Left:     Right:   Rotation:           Left:     Right:   Side Glide:        Left:     Right:                                                               Hip Evaluation    Hip Strength:    Flexion:   Left: 4+/5   Pain:  Right: 5/5   Pain:                        Adduction:  Left: 5/5    Pain:Right: 5/5   Pain:      Knee Flexion:  Left: 5/5   Pain:Right: 5/5   Pain:  Knee Extension:  Left: 5-/5   Pain:Right: 5/5    Pain:        Hip Special Testing:      Left hip negative for the following special tests:  SLR  Right hip negative for the following special tests:  SLR                 General     ROS    Assessment/Plan:    Patient is a 60 year old female with right side ankle complaints.    Patient has the following significant findings with corresponding treatment plan.                Diagnosis 1:  Acute right ankle sprain  Pain -  hot/cold therapy, US, electric stimulation, mechanical traction, manual therapy, STS, splint/taping/bracing/orthotics, self management, education, directional preference exercise and home program  Decreased strength - therapeutic exercise, therapeutic activities and home program  Impaired gait - gait training, assistive  devices and home program  Impaired muscle performance - biofeedback, electric stimulation, neuro re-education and home program    Therapy Evaluation Codes:   Cumulative Therapy Evaluation is: Low complexity.    Previous and current functional limitations:  (See Goal Flow Sheet for this information)    Short term and Long term goals: (See Goal Flow Sheet for this information)     Communication ability:  Patient appears to be able to clearly communicate and understand verbal and written communication and follow directions correctly.  Treatment Explanation - The following has been discussed with the patient:   RX ordered/plan of care  Anticipated outcomes  Possible risks and side effects  This patient would benefit from PT intervention to resume normal activities.   Rehab potential is good.    Frequency:  2 X a month, once daily  Duration:  for 3 months  Discharge Plan:  Achieve all LTG.  Independent in home treatment program.  Reach maximal therapeutic benefit.    Please refer to the daily flowsheet for treatment today, total treatment time and time spent performing 1:1 timed codes.

## 2021-06-25 ENCOUNTER — THERAPY VISIT (OUTPATIENT)
Dept: PHYSICAL THERAPY | Facility: CLINIC | Age: 61
End: 2021-06-25
Payer: COMMERCIAL

## 2021-06-25 DIAGNOSIS — M25.571 ACUTE RIGHT ANKLE PAIN: ICD-10-CM

## 2021-06-25 PROCEDURE — 97530 THERAPEUTIC ACTIVITIES: CPT | Mod: GP | Performed by: PHYSICAL THERAPIST

## 2021-06-25 PROCEDURE — 97110 THERAPEUTIC EXERCISES: CPT | Mod: GP | Performed by: PHYSICAL THERAPIST

## 2021-08-12 PROBLEM — M25.571 ACUTE RIGHT ANKLE PAIN: Status: RESOLVED | Noted: 2021-05-14 | Resolved: 2021-08-12

## 2021-08-12 NOTE — PROGRESS NOTES
Discharge Note    Progress reporting period is from initial evaluation date (please see noted date below) to Jun 25, 2021.  Linked Episodes   Type: Episode: Status: Noted: Resolved: Last update: Updated by:   PHYSICAL THERAPY R ankle, L knee, low back Active 5/14/2021 6/25/2021  9:24 AM Katherine Ni, PT      Comments:       Franchesca failed to follow up and current status is unknown.  Please see information below for last relevant information on current status.  Patient seen for 2 visits.    SUBJECTIVE  Subjective changes noted by patient:  She has noticed her right ankle will still get swollen with yard work. She was able to go on a hike without pain. She has been having no left knee or back pain.   .  Current pain level is  .     Previous pain level was  2/10.   Changes in function:  Yes (See Goal flowsheet attached for changes in current functional level)  Adverse reaction to treatment or activity: None    OBJECTIVE  Changes noted in objective findings: ttp: great toe adductor on R side, Pt demonstrated good understanding of HEP after cueing, no increase in pain.     ASSESSMENT/PLAN  Diagnosis: acute R ankle pain   Updated problem list and treatment plan:   Pain - HEP  STG/LTGs have been met or progress has been made towards goals:  Yes, please see goal flowsheet for most current information  Assessment of Progress: current status is unknown.    Last current status: Pt is progressing as expected   Self Management Plans:  HEP  I have re-evaluated this patient and find that the nature, scope, duration and intensity of the therapy is appropriate for the medical condition of the patient.  Franchesca continues to require the following intervention to meet STG and LTG's:  HEP.    Recommendations:  Discharge with current home program.  Patient to follow up with MD as needed.    Please refer to the daily flowsheet for treatment today, total treatment time and time spent performing 1:1 timed codes.

## 2021-09-08 ENCOUNTER — MYC MEDICAL ADVICE (OUTPATIENT)
Dept: FAMILY MEDICINE | Facility: CLINIC | Age: 61
End: 2021-09-08

## 2021-10-23 ENCOUNTER — HEALTH MAINTENANCE LETTER (OUTPATIENT)
Age: 61
End: 2021-10-23

## 2021-11-14 ENCOUNTER — OFFICE VISIT (OUTPATIENT)
Dept: URGENT CARE | Facility: URGENT CARE | Age: 61
End: 2021-11-14
Payer: COMMERCIAL

## 2021-11-14 VITALS
WEIGHT: 190 LBS | HEART RATE: 104 BPM | OXYGEN SATURATION: 97 % | DIASTOLIC BLOOD PRESSURE: 84 MMHG | SYSTOLIC BLOOD PRESSURE: 142 MMHG | TEMPERATURE: 99.5 F | BODY MASS INDEX: 31.65 KG/M2 | HEIGHT: 65 IN

## 2021-11-14 DIAGNOSIS — H43.391 FLOATERS IN VISUAL FIELD, RIGHT: Primary | ICD-10-CM

## 2021-11-14 PROCEDURE — 99212 OFFICE O/P EST SF 10 MIN: CPT | Performed by: STUDENT IN AN ORGANIZED HEALTH CARE EDUCATION/TRAINING PROGRAM

## 2021-11-14 ASSESSMENT — MIFFLIN-ST. JEOR: SCORE: 1437.71

## 2021-11-14 NOTE — PROGRESS NOTES
"URGENT CARE - [place]    Assessment & Plan    Franchesca Hyatt is a 60 year old with a significant past medical history of hypertension presenting with the following issues:    Problem List Items Addressed This Visit     None      Visit Diagnoses     Floaters in visual field, right    -  Primary            No follow-ups on file.   Return precautions discussed (persistent visual changes, worsening headache, visual field loss). Reassurance provided.    Lillie Sanchez MD      Subjective     Had some R-sided eye symptoms described as floaters associated with some mild headache; has some R-sided tenderness associated with a crown being out of place in her mouth. Visited the eye doctor in the summer and remembered they said that they were watching something and patient was unable to review this, so she was worried.    Currently has no headache, and the visual changes are not persistent. Feels that she would just like to get checked out and get reassurance.      Objective     BP (!) 142/84 (BP Location: Left arm)   Pulse 104   Temp 99.5  F (37.5  C) (Temporal)   Ht 1.651 m (5' 5\")   Wt 86.2 kg (190 lb)   LMP 06/09/2014   SpO2 97%   BMI 31.62 kg/m     Physical Exam  Constitutional:       General: She is not in acute distress.     Appearance: Normal appearance. She is not ill-appearing.   HENT:      Head: Normocephalic and atraumatic.   Eyes:      General:         Right eye: No discharge.         Left eye: No discharge.      Extraocular Movements: Extraocular movements intact.      Conjunctiva/sclera: Conjunctivae normal.      Pupils: Pupils are equal, round, and reactive to light.   Musculoskeletal:      Cervical back: Neck supple.   Neurological:      General: No focal deficit present.      Mental Status: She is alert. Mental status is at baseline.      Cranial Nerves: No cranial nerve deficit.      Coordination: Coordination normal.      Gait: Gait normal.   Psychiatric:         Mood and Affect: Mood normal.   "       Behavior: Behavior normal.          Labs and imaging notable for: n/a

## 2021-11-30 ENCOUNTER — MYC MEDICAL ADVICE (OUTPATIENT)
Dept: FAMILY MEDICINE | Facility: CLINIC | Age: 61
End: 2021-11-30
Payer: COMMERCIAL

## 2021-11-30 DIAGNOSIS — N18.31 STAGE 3A CHRONIC KIDNEY DISEASE (H): Primary | ICD-10-CM

## 2021-11-30 DIAGNOSIS — R73.01 IMPAIRED FASTING GLUCOSE: ICD-10-CM

## 2021-11-30 DIAGNOSIS — Z12.31 ENCOUNTER FOR SCREENING MAMMOGRAM FOR BREAST CANCER: ICD-10-CM

## 2021-11-30 DIAGNOSIS — E78.5 HYPERLIPIDEMIA LDL GOAL <130: ICD-10-CM

## 2021-11-30 DIAGNOSIS — I10 ESSENTIAL HYPERTENSION WITH GOAL BLOOD PRESSURE LESS THAN 140/90: ICD-10-CM

## 2021-11-30 NOTE — TELEPHONE ENCOUNTER
Health Maintenance Due   Topic Date Due     ZOSTER IMMUNIZATION (1 of 2) Never done     HEMOGLOBIN  11/17/2018     LIPID  07/01/2021     INFLUENZA VACCINE (1) Never done     COVID-19 Vaccine (3 - Booster for Pfizer series) 10/27/2021     MAMMO SCREENING  12/10/2021    see [employer] message to schedule labs, mammogram and blood pressure recheck.  Sincerely,  Dr. Monica Lemus MD  11/30/2021

## 2021-12-16 ENCOUNTER — ANCILLARY PROCEDURE (OUTPATIENT)
Dept: MAMMOGRAPHY | Facility: CLINIC | Age: 61
End: 2021-12-16
Attending: FAMILY MEDICINE
Payer: COMMERCIAL

## 2021-12-16 DIAGNOSIS — N63.0 LUMP OR MASS IN BREAST: ICD-10-CM

## 2021-12-16 PROCEDURE — 77066 DX MAMMO INCL CAD BI: CPT | Performed by: RADIOLOGY

## 2021-12-16 PROCEDURE — G0279 TOMOSYNTHESIS, MAMMO: HCPCS | Performed by: RADIOLOGY

## 2021-12-16 PROCEDURE — 76642 ULTRASOUND BREAST LIMITED: CPT | Mod: LT | Performed by: RADIOLOGY

## 2021-12-17 ENCOUNTER — ALLIED HEALTH/NURSE VISIT (OUTPATIENT)
Dept: FAMILY MEDICINE | Facility: CLINIC | Age: 61
End: 2021-12-17
Payer: COMMERCIAL

## 2021-12-17 ENCOUNTER — LAB (OUTPATIENT)
Dept: LAB | Facility: CLINIC | Age: 61
End: 2021-12-17
Payer: COMMERCIAL

## 2021-12-17 VITALS — OXYGEN SATURATION: 95 % | DIASTOLIC BLOOD PRESSURE: 82 MMHG | SYSTOLIC BLOOD PRESSURE: 118 MMHG | HEART RATE: 74 BPM

## 2021-12-17 DIAGNOSIS — E78.5 HYPERLIPIDEMIA LDL GOAL <130: ICD-10-CM

## 2021-12-17 DIAGNOSIS — Z01.30 BP CHECK: Primary | ICD-10-CM

## 2021-12-17 DIAGNOSIS — N18.31 STAGE 3A CHRONIC KIDNEY DISEASE (H): ICD-10-CM

## 2021-12-17 DIAGNOSIS — R73.01 IMPAIRED FASTING GLUCOSE: ICD-10-CM

## 2021-12-17 LAB
ANION GAP SERPL CALCULATED.3IONS-SCNC: 5 MMOL/L (ref 3–14)
BUN SERPL-MCNC: 17 MG/DL (ref 7–30)
CALCIUM SERPL-MCNC: 9.2 MG/DL (ref 8.5–10.1)
CHLORIDE BLD-SCNC: 106 MMOL/L (ref 94–109)
CO2 SERPL-SCNC: 26 MMOL/L (ref 20–32)
CREAT SERPL-MCNC: 1.05 MG/DL (ref 0.52–1.04)
GFR SERPL CREATININE-BSD FRML MDRD: 58 ML/MIN/1.73M2
GLUCOSE BLD-MCNC: 110 MG/DL (ref 70–99)
HBA1C MFR BLD: 5.9 % (ref 0–5.6)
HGB BLD-MCNC: 14.7 G/DL (ref 11.7–15.7)
POTASSIUM BLD-SCNC: 4.1 MMOL/L (ref 3.4–5.3)
SODIUM SERPL-SCNC: 137 MMOL/L (ref 133–144)

## 2021-12-17 PROCEDURE — 85018 HEMOGLOBIN: CPT

## 2021-12-17 PROCEDURE — 83036 HEMOGLOBIN GLYCOSYLATED A1C: CPT

## 2021-12-17 PROCEDURE — 80048 BASIC METABOLIC PNL TOTAL CA: CPT

## 2021-12-17 PROCEDURE — 36415 COLL VENOUS BLD VENIPUNCTURE: CPT

## 2021-12-17 PROCEDURE — 99207 PR NO CHARGE NURSE ONLY: CPT

## 2021-12-17 PROCEDURE — 80061 LIPID PANEL: CPT

## 2021-12-17 NOTE — RESULT ENCOUNTER NOTE
Thank you for getting labs done. Your lab test to check for diabetes, HgA1C, also called glycosylated hemoglobin, which measures the level of sugar in your blood over the past few months, is slightly higher than normal but less than 6.5. This is good news, it means you don't have diabetes right now!  However, it does mean that you're at risk for developing diabetes in the future, so we'll keep checking this lab every year.    Your hemoglobin was normal, you are not anemic.    The testing of your blood sugar  and electrolytes was normal.  Kidney function is assessed by blood work that measures creatinine and calculates estimated GFR (glomerular filtration rate).  By current criteria you do have stage 3 chronic kidney disease as your eGFR is < 60.  This is not something that is urgent as your value has not changed much with time.    However, it does mean will monitor your labs (urine and blood tests) every 6-12 months and we will keep trying to make sure your blood pressure and cholesterol are at goal to keep your kidneys as healthy as possible.     If you have any questions, please contact the clinic or schedule an appointment with me, thank you!      Sincerely,  Dr. Monica Lemus MD  12/17/2021

## 2021-12-17 NOTE — NURSING NOTE
Franchesca Hyatt is a 61 year old patient who comes in today for a Blood Pressure check.  Initial BP:  /82 (BP Location: Left arm, Patient Position: Sitting, Cuff Size: Adult Regular)   Pulse 74   LMP 06/09/2014   SpO2 95%      74  Disposition: results routed to provider.    Kristie Benedict MA

## 2021-12-21 LAB
CHOLEST SERPL-MCNC: 224 MG/DL
FASTING STATUS PATIENT QL REPORTED: ABNORMAL
HDLC SERPL-MCNC: 45 MG/DL
LDLC SERPL CALC-MCNC: 154 MG/DL
NONHDLC SERPL-MCNC: 179 MG/DL
TRIGL SERPL-MCNC: 127 MG/DL

## 2021-12-22 NOTE — RESULT ENCOUNTER NOTE
Thank you for getting your cholesterol checked!  Desired or goal levels are:  CHOLESTEROL: Desirable is less than 200.  HDL (Good Cholesterol): Desirable is greater than 40 in men and greater than 50 in women.  LDL (Bad Cholesterol): Desirable is less [id number].  TRIGLYCERIDES: Desirable is less than 150.    Please consider starting or increasing your aerobic activity; this is the best way to improve HDL (good) cholesterol. Exercise for at least 30 min 5 times per week, and lift weights 2-3 times per week.    As you may know, abnormal cholesterol is one factor that increases your risk for heart disease and stroke. You can improve your cholesterol by controlling the amount and type of fat you eat and by increasing your daily activity level.    Here are some ways to improve your nutrition (adapted from the American Academy of Family Practice handout):  Eat less fat (especially butter, Crisco and other saturated fats)  Buy lean cuts of meat; reduce your portions of red meat or substitute poultry or fish, or avoid meat altogether.  Use skim milk and low-fat dairy products  Eat no more than 4 egg yolks per week  Avoid fried or fast foods that are high in fat  Eat more fruits and vegetables, trying to make your plate of food at least half non-starchy vegetables.     Sincerely,  Dr. Monica Lemus MD  12/22/2021

## 2022-01-13 ENCOUNTER — IMMUNIZATION (OUTPATIENT)
Dept: NURSING | Facility: CLINIC | Age: 62
End: 2022-01-13
Payer: COMMERCIAL

## 2022-01-13 PROCEDURE — 0054A COVID-19,PF,PFIZER (12+ YRS): CPT

## 2022-01-13 PROCEDURE — 91305 COVID-19,PF,PFIZER (12+ YRS): CPT

## 2022-03-10 ENCOUNTER — NURSE TRIAGE (OUTPATIENT)
Dept: NURSING | Facility: CLINIC | Age: 62
End: 2022-03-10
Payer: COMMERCIAL

## 2022-03-10 ENCOUNTER — OFFICE VISIT (OUTPATIENT)
Dept: URGENT CARE | Facility: URGENT CARE | Age: 62
End: 2022-03-10
Payer: COMMERCIAL

## 2022-03-10 VITALS
TEMPERATURE: 97.6 F | HEIGHT: 65 IN | RESPIRATION RATE: 18 BRPM | BODY MASS INDEX: 30.82 KG/M2 | WEIGHT: 185 LBS | OXYGEN SATURATION: 98 % | HEART RATE: 98 BPM | DIASTOLIC BLOOD PRESSURE: 94 MMHG | SYSTOLIC BLOOD PRESSURE: 151 MMHG

## 2022-03-10 DIAGNOSIS — R31.29 MICROSCOPIC HEMATURIA: ICD-10-CM

## 2022-03-10 DIAGNOSIS — M54.50 ACUTE LEFT-SIDED LOW BACK PAIN WITHOUT SCIATICA: Primary | ICD-10-CM

## 2022-03-10 LAB
ALBUMIN SERPL-MCNC: 4.3 G/DL (ref 3.4–5)
ALBUMIN UR-MCNC: NEGATIVE MG/DL
ALP SERPL-CCNC: 52 U/L (ref 40–150)
ALT SERPL W P-5'-P-CCNC: 33 U/L (ref 0–50)
ANION GAP SERPL CALCULATED.3IONS-SCNC: 5 MMOL/L (ref 3–14)
APPEARANCE UR: CLEAR
AST SERPL W P-5'-P-CCNC: 16 U/L (ref 0–45)
BASOPHILS # BLD AUTO: 0 10E3/UL (ref 0–0.2)
BASOPHILS NFR BLD AUTO: 0 %
BILIRUB SERPL-MCNC: 1.4 MG/DL (ref 0.2–1.3)
BILIRUB UR QL STRIP: NEGATIVE
BUN SERPL-MCNC: 15 MG/DL (ref 7–30)
CALCIUM SERPL-MCNC: 9.6 MG/DL (ref 8.5–10.1)
CHLORIDE BLD-SCNC: 109 MMOL/L (ref 94–109)
CO2 SERPL-SCNC: 26 MMOL/L (ref 20–32)
COLOR UR AUTO: YELLOW
CREAT SERPL-MCNC: 1.11 MG/DL (ref 0.52–1.04)
EOSINOPHIL # BLD AUTO: 0.1 10E3/UL (ref 0–0.7)
EOSINOPHIL NFR BLD AUTO: 1 %
ERYTHROCYTE [DISTWIDTH] IN BLOOD BY AUTOMATED COUNT: 12 % (ref 10–15)
GFR SERPL CREATININE-BSD FRML MDRD: 57 ML/MIN/1.73M2
GLUCOSE BLD-MCNC: 100 MG/DL (ref 70–99)
GLUCOSE UR STRIP-MCNC: NEGATIVE MG/DL
HCT VFR BLD AUTO: 43.3 % (ref 35–47)
HGB BLD-MCNC: 14.6 G/DL (ref 11.7–15.7)
HGB UR QL STRIP: ABNORMAL
IMM GRANULOCYTES # BLD: 0 10E3/UL
IMM GRANULOCYTES NFR BLD: 0 %
KETONES UR STRIP-MCNC: NEGATIVE MG/DL
LEUKOCYTE ESTERASE UR QL STRIP: NEGATIVE
LIPASE SERPL-CCNC: 148 U/L (ref 73–393)
LYMPHOCYTES # BLD AUTO: 1.7 10E3/UL (ref 0.8–5.3)
LYMPHOCYTES NFR BLD AUTO: 27 %
MCH RBC QN AUTO: 31.1 PG (ref 26.5–33)
MCHC RBC AUTO-ENTMCNC: 33.7 G/DL (ref 31.5–36.5)
MCV RBC AUTO: 92 FL (ref 78–100)
MONOCYTES # BLD AUTO: 0.5 10E3/UL (ref 0–1.3)
MONOCYTES NFR BLD AUTO: 7 %
NEUTROPHILS # BLD AUTO: 4 10E3/UL (ref 1.6–8.3)
NEUTROPHILS NFR BLD AUTO: 65 %
NITRATE UR QL: NEGATIVE
PH UR STRIP: 5.5 [PH] (ref 5–7)
PLATELET # BLD AUTO: 235 10E3/UL (ref 150–450)
POTASSIUM BLD-SCNC: 4 MMOL/L (ref 3.4–5.3)
PROT SERPL-MCNC: 7.4 G/DL (ref 6.8–8.8)
RBC # BLD AUTO: 4.7 10E6/UL (ref 3.8–5.2)
RBC #/AREA URNS AUTO: NORMAL /HPF
SODIUM SERPL-SCNC: 140 MMOL/L (ref 133–144)
SP GR UR STRIP: 1.01 (ref 1–1.03)
UROBILINOGEN UR STRIP-ACNC: 0.2 E.U./DL
WBC # BLD AUTO: 6.2 10E3/UL (ref 4–11)
WBC #/AREA URNS AUTO: NORMAL /HPF

## 2022-03-10 PROCEDURE — 83690 ASSAY OF LIPASE: CPT | Performed by: PREVENTIVE MEDICINE

## 2022-03-10 PROCEDURE — 80053 COMPREHEN METABOLIC PANEL: CPT | Performed by: PREVENTIVE MEDICINE

## 2022-03-10 PROCEDURE — 85025 COMPLETE CBC W/AUTO DIFF WBC: CPT | Performed by: PREVENTIVE MEDICINE

## 2022-03-10 PROCEDURE — 81001 URINALYSIS AUTO W/SCOPE: CPT | Performed by: PREVENTIVE MEDICINE

## 2022-03-10 PROCEDURE — 99214 OFFICE O/P EST MOD 30 MIN: CPT | Performed by: PREVENTIVE MEDICINE

## 2022-03-10 PROCEDURE — 36415 COLL VENOUS BLD VENIPUNCTURE: CPT | Performed by: PREVENTIVE MEDICINE

## 2022-03-10 NOTE — TELEPHONE ENCOUNTER
"Triage Call:     Pt calling because she is concerned that she might have a UTI. She reports that she doesn't usually have the typical UTI sx. Her UTIs present as back pain.     Pt also reports that she has chronic back issues and that recently her \"back locked up\"    She also reports that she has a compromised kidney    No fever or burning with urination. Pt is able to empty her bladder.     Disposition: See today in office. Pt was given care advice and plans to go to the nearby [facility] [place] [facility].     Brandi Fung RN  [facility] Nurse Advisor 12:30 PM 3/10/2022      Reason for Disposition    Side (flank) or lower back pain present    Additional Information    Negative: Shock suspected (e.g., cold/pale/clammy skin, too weak to stand, low BP, rapid pulse)    Negative: Sounds like a life-threatening emergency to the triager    Negative: Followed a genital area injury    Negative: Followed a genital area injury (penis, scrotum)    Negative: Vaginal discharge    Negative: Pus (white, yellow) or bloody discharge from end of penis    Negative: Discomfort (pain, burning or stinging) when passing urine and pregnant    Negative: Discomfort (pain, burning or stinging) when passing urine and female    Negative: Discomfort (pain, burning or stinging) when passing urine and male    Negative: Pain or itching in the vulvar area    Negative: Pain in scrotum is main symptom    Negative: Blood in the urine is main symptom    Negative: Symptoms arising from use of a urinary catheter (Song or Coude)    Negative: Unable to urinate (or only a few drops) > 4 hours and bladder feels very full (e.g., palpable bladder or strong urge to urinate)    Negative: Decreased urination and drinking very little and dehydration suspected (e.g., dark urine, no urine > 12 hours, very dry mouth, very lightheaded)    Negative: Patient sounds very sick or weak to the triager    Negative: Fever > 100.4 F (38.0 C)    Protocols used: URINARY SYMPTOMS-A-[place]    COVID 19 " Nurse Triage Plan/Patient Instructions    Please be aware that novel coronavirus (COVID-19) may be circulating in the community. If you develop symptoms such as fever, cough, or SOB or if you have concerns about the presence of another infection including coronavirus (COVID-19), please contact your health care provider or visit [email or web address].     Disposition/Instructions    In-Person Visit with provider recommended. Reference Visit Selection Guide.    Thank you for taking steps to prevent the spread of this virus.  o Limit your contact with others.  o Wear a simple mask to cover your cough.  o Wash your hands well and often.    Resources    [facility]: About COVID-19: [email or web address]    CDC: What to Do If You're Sick: www.cdc.gov/coronavirus/2019-ncov/about/steps-when-sick.html    CDC: Ending Home Isolation: www.cdc.gov/coronavirus/2019-ncov/hcp/disposition-in-home-patients.html     CDC: Caring for Someone: www.cdc.gov/coronavirus/2019-ncov/if-you-are-sick/care-for-someone.html     [facility]: Interim Guidance for Hospital Discharge to Home: www.[email or web address].[email or web address].[place]/diseases/coronavirus/hcp/hospdischarge.pdf    [facility] clinical trials (COVID-19 research studies): [email or web address]-clinical-trials     Below are the COVID-19 hotlines at the [place] Department of Health ([facility]). Interpreters are available.   o For health questions: Call 281-202-1511 or 1-229.347.7104 (7 a.m. to 7 p.m.)  o For questions about schools and childcare: Call 998-541-5025 or 1-244.684.3824 (7 a.m. to 7 p.m.)

## 2022-03-10 NOTE — PROGRESS NOTES
Assessment & Plan     1. Acute left-sided low back pain without sciatica  - CBC with platelets and differential; Future  - Comprehensive metabolic panel; Future  - Lipase; Future  Consistent with musculoskeletal low back pain  Advise heat, ice, stretching, tylenol  Follow up if not improving in 10-14 days      31 minutes spent on the date of the encounter doing chart review, review of test results, interpretation of tests, patient visit and documentation         No follow-ups on file.    Bennett Del Rio MD  [facility]    Subjective     Franchesca Hyatt is a 61 year old year old female who presents to clinic today for the following health issues:    Patient presents with:  Urgent Care: rule out UTI patient been having low back pain  This is a 60 yo female who presents with left low back pain over the past 4 days.  No fever or chills.  No left flank pain.  No pain with urination or blood in urine.  No n/v/c/d/blood in stool or black stool.  No rash.  No pain in legs, weakness in legs or change in sensation there.  No loss of sensation in pelvis.  No change in bowel or bladder function.  History of self limited musculoskeletal low back pain episodes.  Concerned she may have a uti.        Patient Active Problem List   Diagnosis     Family history of colonic polyps     CKD (chronic kidney disease) stage 3, GFR 30-59 ml/min (H)     Perimenopause     Tubular adenoma of colon     Impaired fasting glucose     Essential hypertension with goal blood pressure less than 140/90     Elevated bilirubin     Seasonal affective disorder (H)     Abnormal weight gain     Obesity, Class I, BMI 30-34.9     Hypercholesteremia     Hyperlipidemia LDL goal <130     Blood type A+     Elevated blood pressure reading without diagnosis of hypertension       Current Outpatient Medications   Medication     ACETAMINOPHEN PO     ACIDOPHILUS OR CAPS     Ascorbic Acid (VITAMIN C PO)     Cyanocobalamin (VITAMIN B 12  PO)     DIGEST ENZYMES-ANTICHOLINERGIC OR CAPS     Flaxseed, Linseed, (FLAX SEED OIL PO)     hydrOXYzine (ATARAX) 25 MG tablet     lisinopril (ZESTRIL) 10 MG tablet     MAGNESIUM 300 MG OR CAPS     Multiple Vitamins-Minerals (ZINC PO)     Nettle, Urtica Dioica, (NETTLE LEAF PO)     order for DME     VITAMIN D OR     No current facility-administered medications for this visit.       Past Medical History:   Diagnosis Date     Calculus of kidney      Calculus, kidney 2007     CKD (chronic kidney disease) stage 3, GFR 30-59 ml/min (H)      Hypercholesteremia 11/29/2018     Hypertension goal BP (blood pressure) < 140/90 11/5/2012     Seasonal allergic rhinitis     seasonal allergies, cats     Tubular adenoma of colon 10/7/2014    Colonoscopy 10/1/2014, repeat 2019     Urinary tract infection, site not specified 1980 to early '90's    Past history of        Social History   reports that she quit smoking about 31 years ago. Her smoking use included cigarettes. She started smoking about 35 years ago. She smoked 0.00 packs per day for 5.00 years. She has never used smokeless tobacco. She reports current alcohol use. She reports that she does not use drugs.    Family History   Problem Relation Age of Onset     Hypertension Mother      Cerebrovascular Disease Mother         at age 50     Allergies Mother      Arthritis Mother         osteo     Depression Mother         Treated holistically     Cancer - colorectal Mother         BENIGN polyps 68 (2007)     Hypertension Father      Allergies Father      Depression Father         Took medication     Cerebrovascular Disease Father      Heart Disease Father         had heart surgery and a stroke      Cancer - colorectal Maternal Grandmother         at age 50     Alzheimer Disease Maternal Grandmother         diag at age 80     Eye Disorder Maternal Grandmother         cateracts     Osteoporosis Maternal Grandmother      Cerebrovascular Disease Maternal Grandmother      Colon Cancer  "Maternal Grandmother      Cancer Maternal Grandfather         smoker     Cancer Paternal Grandfather         smoker     Hypertension Brother      Depression Sister         Holistically     Gynecology Sister         cervical cancer at age 30     Cancer Sister         cervical       Review of Systems  Constitutional, HEENT, cardiovascular, pulmonary, GI, , musculoskeletal, neuro, skin, endocrine and psych systems are negative, except as otherwise noted.      Objective    BP (!) 151/94   Pulse 98   Temp 97.6  F (36.4  C) (Oral)   Resp 18   Ht 1.651 m (5' 5\")   Wt 83.9 kg (185 lb)   LMP 06/09/2014   SpO2 98%   BMI 30.79 kg/m    Physical Exam   GENERAL: healthy, alert and no distress  EYES: Eyes grossly normal to inspection, PERRL and conjunctivae and sclerae normal  HENT: ear canals and TM's normal, nose and mouth without ulcers or lesions  NECK: no adenopathy, no asymmetry, masses, or scars and thyroid normal to palpation  RESP: lungs clear to auscultation - no rales, rhonchi or wheezes  CV: regular rate and rhythm, normal S1 S2, no S3 or S4, no murmur, click or rub, no peripheral edema and peripheral pulses strong  ABDOMEN: soft, nontender, no hepatosplenomegaly, no masses and bowel sounds normal  MS: no gross musculoskeletal defects noted, no edema  SKIN: no suspicious lesions or rashes  NEURO: Normal strength and tone, mentation intact and speech normal  PSYCH: mentation appears normal, affect normal/bright  LOW BACK-  mild ttp left paraspinal region, no ttp l spine, neg slr piero, nl int/ext rot hips, nl strength, sensation, reflexes piero LE, nl rom back, nl gait, no si jt ttp, no pelvic tilt, no rash      Results for orders placed or performed in visit on 03/10/22 (from the past 24 hour(s))   UA Macro with Reflex to Micro and Culture - lab collect    Specimen: Urine, Midstream   Result Value Ref Range    Color Urine Yellow Colorless, Straw, Light Yellow, Yellow    Appearance Urine Clear Clear    Glucose " Urine Negative Negative mg/dL    Bilirubin Urine Negative Negative    Ketones Urine Negative Negative mg/dL    Specific Gravity Urine 1.015 1.003 - 1.035    Blood Urine Trace (A) Negative    pH Urine 5.5 5.0 - 7.0    Protein Albumin Urine Negative Negative mg/dL    Urobilinogen Urine 0.2 0.2, 1.0 E.U./dL    Nitrite Urine Negative Negative    Leukocyte Esterase Urine Negative Negative   Urine Microscopic   Result Value Ref Range    RBC Urine 0-2 0-2 /HPF /HPF    WBC Urine 0-5 0-5 /HPF /HPF    Narrative    Urine Culture not indicated   CBC with platelets and differential    Narrative    The following orders were created for panel order CBC with platelets and differential.  Procedure                               Abnormality         Status                     ---------                               -----------         ------                     CBC with platelets and d...[[id number]]                      Final result                 Please view results for these tests on the individual orders.   CBC with platelets and differential   Result Value Ref Range    WBC Count 6.2 4.0 - 11.0 10e3/uL    RBC Count 4.70 3.80 - 5.20 10e6/uL    Hemoglobin 14.6 11.7 - 15.7 g/dL    Hematocrit 43.3 35.0 - 47.0 %    MCV 92 78 - 100 fL    MCH 31.1 26.5 - 33.0 pg    MCHC 33.7 31.5 - 36.5 g/dL    RDW 12.0 10.0 - 15.0 %    Platelet Count 235 150 - 450 10e3/uL    % Neutrophils 65 %    % Lymphocytes 27 %    % Monocytes 7 %    % Eosinophils 1 %    % Basophils 0 %    % Immature Granulocytes 0 %    Absolute Neutrophils 4.0 1.6 - 8.3 10e3/uL    Absolute Lymphocytes 1.7 0.8 - 5.3 10e3/uL    Absolute Monocytes 0.5 0.0 - 1.3 10e3/uL    Absolute Eosinophils 0.1 0.0 - 0.7 10e3/uL    Absolute Basophils 0.0 0.0 - 0.2 10e3/uL    Absolute Immature Granulocytes 0.0 <=0.4 10e3/uL

## 2022-06-04 ENCOUNTER — HEALTH MAINTENANCE LETTER (OUTPATIENT)
Age: 62
End: 2022-06-04

## 2022-08-16 DIAGNOSIS — L40.9 PSORIASIS: ICD-10-CM

## 2022-08-18 ENCOUNTER — MYC MEDICAL ADVICE (OUTPATIENT)
Dept: FAMILY MEDICINE | Facility: CLINIC | Age: 62
End: 2022-08-18

## 2022-08-18 DIAGNOSIS — L40.9 PSORIASIS: ICD-10-CM

## 2022-08-18 RX ORDER — HYDROXYZINE HYDROCHLORIDE 25 MG/1
TABLET, FILM COATED ORAL
Qty: 30 TABLET | Refills: 2 | OUTPATIENT
Start: 2022-08-18

## 2022-08-18 NOTE — CONFIDENTIAL NOTE
Passes protocol but last filled 4/1/21 and marked not taking 11/14/21.    Last visit 4/1/21 with Allan  Future visit 10/13/22 with Allan Cronin RN  [facility]

## 2022-08-24 RX ORDER — HYDROXYZINE HYDROCHLORIDE 25 MG/1
TABLET, FILM COATED ORAL
Qty: 30 TABLET | Refills: 1 | Status: SHIPPED | OUTPATIENT
Start: 2022-08-24 | End: 2024-01-18

## 2022-08-24 NOTE — TELEPHONE ENCOUNTER
Passes protocol and pt confirmed takes as needed.     Sent in chuyita.     Ella Kinsey, JAELN RN  [facility]

## 2022-09-17 DIAGNOSIS — I10 ESSENTIAL HYPERTENSION WITH GOAL BLOOD PRESSURE LESS THAN 140/90: ICD-10-CM

## 2022-09-17 DIAGNOSIS — N18.31 STAGE 3A CHRONIC KIDNEY DISEASE (H): ICD-10-CM

## 2022-09-20 RX ORDER — LISINOPRIL 10 MG/1
TABLET ORAL
Qty: 180 TABLET | Refills: 0 | Status: SHIPPED | OUTPATIENT
Start: 2022-09-20 | End: 2022-12-06

## 2022-09-20 NOTE — TELEPHONE ENCOUNTER
Routing refill request to provider for review/approval because:  --Abnormal blood pressure and Scr.  --Last visit:  4/1/21.  --Scheduled.    --Future Visit:   Next 5 appointments (look out 90 days)    Oct 13, 2022  3:20 PM  (Arrive by 3:00 PM)  Adult Preventative Visit with Monica Lemus MD  [facility] ([facility] ) [address] [place] [id number] [phone]        --Last Written Prescription Date:     Disp Refills Start End JUAN DIEGO   lisinopril (ZESTRIL) 10 MG tablet 180 tablet 0 6/9/2022  No   Sig: TAKE 1 TABLET(10 MG) BY MOUTH TWICE DAILY         BP Readings from Last 6 Encounters:   03/10/22 (!) 151/94   12/17/21 118/82   11/14/21 (!) 142/84   04/01/21 138/86   11/22/19 128/82   11/20/18 139/90     Creatinine   Date Value Ref Range Status   03/10/2022 1.11 (H) 0.52 - 1.04 mg/dL Final   12/17/2021 1.05 (H) 0.52 - 1.04 mg/dL Final   04/01/2021 1.09 (H) 0.52 - 1.04 mg/dL Final   11/22/2019 1.15 (H) 0.52 - 1.04 mg/dL Final   11/20/2018 1.05 (H) 0.52 - 1.04 mg/dL Final   11/17/2017 1.11 (H) 0.52 - 1.04 mg/dL Final   11/15/2016 1.14 (H) 0.52 - 1.04 mg/dL Final

## 2022-09-23 ENCOUNTER — MYC MEDICAL ADVICE (OUTPATIENT)
Dept: FAMILY MEDICINE | Facility: CLINIC | Age: 62
End: 2022-09-23

## 2022-09-23 DIAGNOSIS — E78.5 HYPERLIPIDEMIA LDL GOAL <130: ICD-10-CM

## 2022-09-23 DIAGNOSIS — R73.01 IMPAIRED FASTING GLUCOSE: ICD-10-CM

## 2022-09-23 DIAGNOSIS — I10 ESSENTIAL HYPERTENSION WITH GOAL BLOOD PRESSURE LESS THAN 140/90: Primary | ICD-10-CM

## 2022-09-23 DIAGNOSIS — N18.31 STAGE 3A CHRONIC KIDNEY DISEASE (H): ICD-10-CM

## 2022-10-10 ENCOUNTER — HEALTH MAINTENANCE LETTER (OUTPATIENT)
Age: 62
End: 2022-10-10

## 2022-10-13 ENCOUNTER — LAB (OUTPATIENT)
Dept: LAB | Facility: CLINIC | Age: 62
End: 2022-10-13
Payer: COMMERCIAL

## 2022-10-13 ENCOUNTER — OFFICE VISIT (OUTPATIENT)
Dept: FAMILY MEDICINE | Facility: CLINIC | Age: 62
End: 2022-10-13
Payer: COMMERCIAL

## 2022-10-13 VITALS
DIASTOLIC BLOOD PRESSURE: 88 MMHG | BODY MASS INDEX: 32.27 KG/M2 | HEART RATE: 96 BPM | HEIGHT: 64 IN | WEIGHT: 189 LBS | OXYGEN SATURATION: 96 % | TEMPERATURE: 98 F | RESPIRATION RATE: 18 BRPM | SYSTOLIC BLOOD PRESSURE: 138 MMHG

## 2022-10-13 DIAGNOSIS — R73.01 IMPAIRED FASTING GLUCOSE: ICD-10-CM

## 2022-10-13 DIAGNOSIS — A09 TRAVELER'S DIARRHEA: ICD-10-CM

## 2022-10-13 DIAGNOSIS — N18.31 STAGE 3A CHRONIC KIDNEY DISEASE (H): ICD-10-CM

## 2022-10-13 DIAGNOSIS — F33.8 SEASONAL AFFECTIVE DISORDER (H): ICD-10-CM

## 2022-10-13 DIAGNOSIS — N18.31 CHRONIC KIDNEY DISEASE, STAGE 3A (H): ICD-10-CM

## 2022-10-13 DIAGNOSIS — E78.5 HYPERLIPIDEMIA LDL GOAL <130: ICD-10-CM

## 2022-10-13 DIAGNOSIS — Z00.00 ROUTINE GENERAL MEDICAL EXAMINATION AT A HEALTH CARE FACILITY: Primary | ICD-10-CM

## 2022-10-13 DIAGNOSIS — R03.0 ELEVATED BLOOD PRESSURE READING WITHOUT DIAGNOSIS OF HYPERTENSION: ICD-10-CM

## 2022-10-13 DIAGNOSIS — Z12.4 SCREENING FOR CERVICAL CANCER: ICD-10-CM

## 2022-10-13 DIAGNOSIS — I10 ESSENTIAL HYPERTENSION WITH GOAL BLOOD PRESSURE LESS THAN 140/90: ICD-10-CM

## 2022-10-13 LAB
ALBUMIN SERPL-MCNC: 4.2 G/DL (ref 3.4–5)
ALP SERPL-CCNC: 57 U/L (ref 40–150)
ALT SERPL W P-5'-P-CCNC: 35 U/L (ref 0–50)
ANION GAP SERPL CALCULATED.3IONS-SCNC: 8 MMOL/L (ref 3–14)
AST SERPL W P-5'-P-CCNC: 12 U/L (ref 0–45)
BILIRUB SERPL-MCNC: 1.3 MG/DL (ref 0.2–1.3)
BUN SERPL-MCNC: 15 MG/DL (ref 7–30)
CALCIUM SERPL-MCNC: 9.3 MG/DL (ref 8.5–10.1)
CHLORIDE BLD-SCNC: 108 MMOL/L (ref 94–109)
CHOLEST SERPL-MCNC: 202 MG/DL
CO2 SERPL-SCNC: 23 MMOL/L (ref 20–32)
CREAT SERPL-MCNC: 1.06 MG/DL (ref 0.52–1.04)
CREAT UR-MCNC: 269 MG/DL
FASTING STATUS PATIENT QL REPORTED: YES
GFR SERPL CREATININE-BSD FRML MDRD: 60 ML/MIN/1.73M2
GLUCOSE BLD-MCNC: 117 MG/DL (ref 70–99)
HBA1C MFR BLD: 5.8 % (ref 0–5.6)
HDLC SERPL-MCNC: 32 MG/DL
LDLC SERPL CALC-MCNC: 135 MG/DL
MICROALBUMIN UR-MCNC: 42 MG/L
MICROALBUMIN/CREAT UR: 15.61 MG/G CR (ref 0–25)
NONHDLC SERPL-MCNC: 170 MG/DL
POTASSIUM BLD-SCNC: 4.1 MMOL/L (ref 3.4–5.3)
PROT SERPL-MCNC: 7 G/DL (ref 6.8–8.8)
SODIUM SERPL-SCNC: 139 MMOL/L (ref 133–144)
TRIGL SERPL-MCNC: 177 MG/DL

## 2022-10-13 PROCEDURE — 83036 HEMOGLOBIN GLYCOSYLATED A1C: CPT

## 2022-10-13 PROCEDURE — 99396 PREV VISIT EST AGE 40-64: CPT | Performed by: FAMILY MEDICINE

## 2022-10-13 PROCEDURE — 80053 COMPREHEN METABOLIC PANEL: CPT

## 2022-10-13 PROCEDURE — 87624 HPV HI-RISK TYP POOLED RSLT: CPT | Performed by: FAMILY MEDICINE

## 2022-10-13 PROCEDURE — 82043 UR ALBUMIN QUANTITATIVE: CPT

## 2022-10-13 PROCEDURE — 80061 LIPID PANEL: CPT

## 2022-10-13 PROCEDURE — G0145 SCR C/V CYTO,THINLAYER,RESCR: HCPCS | Performed by: FAMILY MEDICINE

## 2022-10-13 PROCEDURE — 36415 COLL VENOUS BLD VENIPUNCTURE: CPT

## 2022-10-13 RX ORDER — CIPROFLOXACIN 500 MG/1
500 TABLET, FILM COATED ORAL 2 TIMES DAILY
Qty: 6 TABLET | Refills: 0 | Status: ON HOLD | OUTPATIENT
Start: 2022-10-13 | End: 2023-08-24

## 2022-10-13 ASSESSMENT — ENCOUNTER SYMPTOMS
HEMATURIA: 0
DIZZINESS: 0
PARESTHESIAS: 0
DIARRHEA: 1
PALPITATIONS: 0
SORE THROAT: 0
CONSTIPATION: 0
WEAKNESS: 0
DYSURIA: 0
COUGH: 0
JOINT SWELLING: 0
CHILLS: 0
ARTHRALGIAS: 0
HEARTBURN: 0
MYALGIAS: 0
NERVOUS/ANXIOUS: 0
FREQUENCY: 0
HEMATOCHEZIA: 0
FEVER: 0
NAUSEA: 1
HEADACHES: 0
ABDOMINAL PAIN: 0
EYE PAIN: 0
SHORTNESS OF BREATH: 0

## 2022-10-13 NOTE — PROGRESS NOTES
Patient roomed by Deanna ALCANTAR   SUBJECTIVE:   CC: Franchesca is an 61 year old who presents for preventive health visit.     Hypertension Follow-up      Do you check your blood pressure regularly outside of the clinic? Yes     Are you following a low salt diet? Yes    Are your blood pressures ever more than 140 on the top number (systolic) OR more   than 90 on the bottom number (diastolic), for example 140/90? No    Chronic Kidney Disease Follow-up      Do you take any over the counter pain medicine?: no    Impaired fasting glucose Follow-up      Patient is checking blood sugars: not at all    Diabetic concerns: None     Symptoms of hypoglycemia (low blood sugar): none     Paresthesias (numbness or burning in feet) or sores: No      Lab Results   Component Value Date    A1C 5.8 10/13/2022    A1C 5.9 12/17/2021    A1C 6.0 04/01/2021    A1C 5.5 11/22/2019    A1C 5.5 11/20/2018    A1C 5.6 11/17/2017    A1C 5.6 11/15/2016                     Patient has been advised of split billing requirements and indicates understanding: Yes  Healthy Habits:     Getting at least 3 servings of Calcium per day:  Yes    Bi-annual eye exam:  Yes    Dental care twice a year:  Yes    Sleep apnea or symptoms of sleep apnea:  None    Diet:  Carbohydrate counting and Gluten-free/reduced    Frequency of exercise:  4-5 days/week    Duration of exercise:  30-45 minutes    Taking medications regularly:  Yes    Medication side effects:  None    PHQ-2 Total Score: 2    Additional concerns today:  Yes  Covid Concern   Today's PHQ-2 Score:   PHQ-2 ( 1999 Pf[employer]) 10/13/2022   Q1: Little interest or pleasure in doing things 1   Q2: Feeling down, depressed or hopeless 1   PHQ-2 Score 2   PHQ-2 Total Score (12-17 Years)- Positive if 3 or more points; Administer PHQ-A if positive -   Q1: Little interest or pleasure in doing things Several days   Q2: Feeling down, depressed or hopeless Several days   PHQ-2 Score 2       Abuse: Current or Past (Physical, Sexual  or Emotional) - No  Do you feel safe in your environment? Yes        Social History     Tobacco Use     Smoking status: Former     Packs/day: 0.00     Years: 5.00     Pack years: 0.00     Types: Cigarettes     Start date: 1986     Quit date: 3/12/1991     Years since quittin.6     Smokeless tobacco: Never     Tobacco comments:     Was a light smoker for a few years in my youth.   Substance Use Topics     Alcohol use: Yes     Comment: rare - one glass per week of wine         Alcohol Use 10/13/2022   Prescreen: >3 drinks/day or >7 drinks/week? No   Prescreen: >3 drinks/day or >7 drinks/week? -       Reviewed orders with patient.  Reviewed health maintenance and updated orders accordingly - Yes  BP Readings from Last 3 Encounters:   10/13/22 138/88   03/10/22 (!) 151/94   21 118/82    Wt Readings from Last 3 Encounters:   10/13/22 85.7 kg (189 lb)   03/10/22 83.9 kg (185 lb)   21 86.2 kg (190 lb)                  Patient Active Problem List   Diagnosis     Family history of colonic polyps     Chronic kidney disease, stage 3a (H)     Tubular adenoma of colon     Impaired fasting glucose     Essential hypertension with goal blood pressure less than 140/90     Elevated bilirubin     Seasonal affective disorder (H)     Abnormal weight gain     Obesity, Class I, BMI 30-34.9     Hypercholesteremia     Hyperlipidemia LDL goal <130     Blood type A+     Elevated blood pressure reading without diagnosis of hypertension     Past Surgical History:   Procedure Laterality Date     BIOPSY BREAST      X 2     COLONOSCOPY  6/15/2009    Polyp - Due in 5 years     CYSTOSCOPY, RETROGRADES, MANIPULATE STONE, INSERT STENT, COMBINED       EXCISE PILONIDAL CYST, EXTENSIVE       LASER REVOLIX LITHOTRIPSY URETER(S), INSERT STENT, COMBINED       Sebaceous Cyst Removal  2008    Back       Social History     Tobacco Use     Smoking status: Former     Packs/day: 0.00     Years: 5.00     Pack years: 0.00     Types:  Cigarettes     Start date: 1986     Quit date: 3/12/1991     Years since quittin.6     Smokeless tobacco: Never     Tobacco comments:     Was a light smoker for a few years in my youth.   Substance Use Topics     Alcohol use: Yes     Comment: rare - one glass per week of wine     Family History   Problem Relation Age of Onset     Hypertension Mother      Cerebrovascular Disease Mother         at age 50     Allergies Mother      Arthritis Mother         osteo     Depression Mother         Treated holistically     Cancer - colorectal Mother         BENIGN polyps 67 ()     Hypertension Father      Allergies Father      Depression Father         Took medication     Cerebrovascular Disease Father      Heart Disease Father         had heart surgery and a stroke      Cancer - colorectal Maternal Grandmother         at age 50     Alzheimer Disease Maternal Grandmother         diag at age 80     Eye Disorder Maternal Grandmother         cateracts     Osteoporosis Maternal Grandmother      Cerebrovascular Disease Maternal Grandmother      Colon Cancer Maternal Grandmother      Cancer Maternal Grandfather         smoker     Cancer Paternal Grandfather         smoker     Hypertension Brother      Depression Sister         Holistically     Gynecology Sister         cervical cancer at age 30     Cancer Sister         cervical         Current Outpatient Medications   Medication Sig Dispense Refill     ACETAMINOPHEN PO Take 500 mg by mouth every 6 hours as needed       Ascorbic Acid (VITAMIN C PO)        ciprofloxacin (CIPRO) 500 MG tablet Take 1 tablet (500 mg) by mouth 2 times daily 6 tablet 0     Cyanocobalamin (VITAMIN B 12 PO) Take by mouth daily        hydrOXYzine (ATARAX) 25 MG tablet Take 1/2-2 tabs po HS prn itching 30 tablet 1     lisinopril (ZESTRIL) 10 MG tablet TAKE 1 TABLET(10 MG) BY MOUTH TWICE DAILY 180 tablet 0     MAGNESIUM 300 MG OR CAPS At Bedtime        Multiple Vitamins-Minerals (ZINC PO) Take  by mouth daily ZINC        Nettle, Urtica Dioica, (NETTLE LEAF PO)        order for DME Equipment being ordered: SAD lite box 1 each 0     VITAMIN D OR 1 cap daily in the winter       ACIDOPHILUS OR CAPS 2 caps. daily (Patient not taking: No sig reported)  0     DIGEST ENZYMES-ANTICHOLINERGIC OR CAPS  (Patient not taking: Reported on 10/13/2022)       Flaxseed, Linseed, (FLAX SEED OIL PO) Take by mouth daily  (Patient not taking: Reported on 10/13/2022)       Allergies   Allergen Reactions     Seasonal Allergies      Vicodin [Hydrocodone-Acetaminophen]      Recent Labs   Lab Test 10/13/22  0848 03/10/22  1358 12/17/21  0917 12/17/21  0916 04/01/21  0829 11/22/19  0817 11/20/18  0927   A1C 5.8*  --  5.9*  --  6.0* 5.5 5.5   LDL  --   --   --  154* 146* 145* 172*   HDL  --   --   --  45* 40* 38* 41*   TRIG  --   --   --  127 147 142 153*   ALT  --  33  --   --  31 28 25   CR  --  1.11*  --  1.05* 1.09* 1.15* 1.05*   GFRESTIMATED  --  57*  --  58* 55* 52* 54*   GFRESTBLACK  --   --   --   --  64 61 65   POTASSIUM  --  4.0  --  4.1 4.0 4.1 3.9   TSH  --   --   --   --  3.08  --  1.76        Breast Cancer Screening:    FHS-7:   Breast CA Risk Assessment (FHS-7) 12/14/2021 12/16/2021 10/13/2022   Did any of your first-degree relatives have breast or ovarian cancer? No No No   Did any of your relatives have bilateral breast cancer? No No No   Did any man in your family have breast cancer? No No No   Did any woman in your family have breast and ovarian cancer? No No No   Did any woman in your family have breast cancer before age 50 y? No No No   Do you have 2 or more relatives with breast and/or ovarian cancer? No No No   Do you have 2 or more relatives with breast and/or bowel cancer? No No No         Pertinent mammograms are reviewed under the imaging tab.    History of abnormal Pap smear: NO - age 30-65 PAP every 5 years with negative HPV co-testing recommended  PAP / HPV Latest Ref Rng & Units 11/17/2017 11/6/2013  2012   PAP (Historical) - NIL NIL ASC-US(A)   HPV16 NEG:Negative Negative - -   HPV18 NEG:Negative Negative - -   HRHPV NEG:Negative Negative - -     Reviewed and updated as needed this visit by clinical staff   Tobacco  Allergies  Meds   Med Hx  Surg Hx  Fam Hx  Soc Hx        Reviewed and updated as needed this visit by Provider                 Past Medical History:   Diagnosis Date     Calculus of kidney      Calculus, kidney      CKD (chronic kidney disease) stage 3, GFR 30-59 ml/min (H)      Hypercholesteremia 2018     Hypertension goal BP (blood pressure) < 140/90 2012     Seasonal allergic rhinitis     seasonal allergies, cats     Tubular adenoma of colon 10/7/2014    Colonoscopy 10/1/2014, repeat 2019     Urinary tract infection, site not specified  to early     Past history of       Past Surgical History:   Procedure Laterality Date     BIOPSY BREAST      X 2     COLONOSCOPY  6/15/2009    Polyp - Due in 5 years     CYSTOSCOPY, RETROGRADES, MANIPULATE STONE, INSERT STENT, COMBINED       EXCISE PILONIDAL CYST, EXTENSIVE       LASER REVOLIX LITHOTRIPSY URETER(S), INSERT STENT, COMBINED       Sebaceous Cyst Removal  2008    Back     OB History    Para Term  AB Living   4 0 0 0 4 0   SAB IAB Ectopic Multiple Live Births   0 4 0 0 0      # Outcome Date GA Lbr Jeffery/2nd Weight Sex Delivery Anes PTL Lv   4 IAB            3 IAB            2 IAB            1 IAB                Review of Systems   Constitutional: Negative for chills and fever.   HENT: Positive for congestion. Negative for ear pain, hearing loss and sore throat.    Eyes: Negative for pain and visual disturbance.   Respiratory: Negative for cough and shortness of breath.    Cardiovascular: Positive for chest pain. Negative for palpitations and peripheral edema.   Gastrointestinal: Positive for diarrhea and nausea. Negative for abdominal pain, constipation, heartburn and hematochezia.   Genitourinary:  "Negative for dysuria, frequency, genital sores, hematuria and urgency.   Musculoskeletal: Negative for arthralgias, joint swelling and myalgias.   Skin: Negative for rash.   Neurological: Negative for dizziness, weakness, headaches and paresthesias.   Psychiatric/Behavioral: Negative for mood changes. The patient is not nervous/anxious.         OBJECTIVE:   BP (!) 148/92   Pulse 96   Resp 18   Ht 1.619 m (5' 3.75\")   Wt 85.7 kg (189 lb)   LMP 06/09/2014   SpO2 96%   BMI 32.70 kg/m    Physical Exam  GENERAL: healthy, alert and no distress  EYES: Eyes grossly normal to inspection, PERRL and conjunctivae and sclerae normal  HENT: ear canals and TM's normal, nose and mouth without ulcers or lesions  NECK: no adenopathy, no asymmetry, masses, or scars and thyroid normal to palpation  RESP: lungs clear to auscultation - no rales, rhonchi or wheezes  CV: regular rate and rhythm, normal S1 S2, no S3 or S4, no murmur, click or rub, no peripheral edema and peripheral pulses strong  ABDOMEN: soft, nontender, no hepatosplenomegaly, no masses and bowel sounds normal   (female): normal female external genitalia, normal urethral meatus, vaginal mucosa pink, moist, well rugated, and normal cervix/adnexa/uterus without masses or discharge  MS: no gross musculoskeletal defects noted, no edema  SKIN: no suspicious lesions or rashes  NEURO: Normal strength and tone, mentation intact and speech normal  PSYCH: mentation appears normal, affect normal/bright    Diagnostic Test Results:  Labs reviewed in Epic  Results for orders placed or performed in visit on 10/13/22 (from the past 24 hour(s))   Lipid panel reflex to direct LDL Fasting   Result Value Ref Range    Cholesterol 202 (H) <200 mg/dL    Triglycerides 177 (H) <150 mg/dL    Direct Measure HDL 32 (L) >=50 mg/dL    LDL Cholesterol Calculated 135 (H) <=100 mg/dL    Non HDL Cholesterol 170 (H) <130 mg/dL    Patient Fasting > 8hrs? Yes     Narrative    Cholesterol  Desirable:  " <200 mg/dL    Triglycerides  Normal:  Less than 150 mg/dL  Borderline High:  150-199 mg/dL  High:  200-499 mg/dL  Very High:  Greater than or equal to 500 mg/dL    Direct Measure HDL  Female:  Greater than or equal to 50 mg/dL   Male:  Greater than or equal to 40 mg/dL    LDL Cholesterol  Desirable:  <100mg/dL  Above Desirable:  100-129 mg/dL   Borderline High:  130-159 mg/dL   High:  160-189 mg/dL   Very High:  >= 190 mg/dL    Non HDL Cholesterol  Desirable:  130 mg/dL  Above Desirable:  130-159 mg/dL  Borderline High:  160-189 mg/dL  High:  190-219 mg/dL  Very High:  Greater than or equal to 220 mg/dL   Comprehensive metabolic panel   Result Value Ref Range    Sodium 139 133 - 144 mmol/L    Potassium 4.1 3.4 - 5.3 mmol/L    Chloride 108 94 - 109 mmol/L    Carbon Dioxide (CO2) 23 20 - 32 mmol/L    Anion Gap 8 3 - 14 mmol/L    Urea Nitrogen 15 7 - 30 mg/dL    Creatinine 1.06 (H) 0.52 - 1.04 mg/dL    Calcium 9.3 8.5 - 10.1 mg/dL    Glucose 117 (H) 70 - 99 mg/dL    Alkaline Phosphatase 57 40 - 150 U/L    AST 12 0 - 45 U/L    ALT 35 0 - 50 U/L    Protein Total 7.0 6.8 - 8.8 g/dL    Albumin 4.2 3.4 - 5.0 g/dL    Bilirubin Total 1.3 0.2 - 1.3 mg/dL    GFR Estimate 60 (L) >60 mL/min/1.73m2   Albumin Random Urine Quantitative with Creat Ratio   Result Value Ref Range    Creatinine Urine mg/dL 269 mg/dL    Albumin Urine mg/L 42 mg/L    Albumin Urine mg/g Cr 15.61 0.00 - 25.00 mg/g Cr   Hemoglobin A1c   Result Value Ref Range    Hemoglobin A1C 5.8 (H) 0.0 - 5.6 %       ASSESSMENT/PLAN:   (Z00.00) Routine general medical examination at a health care facility  (primary encounter diagnosis)  routine    (N18.31) Chronic kidney disease, stage 3a (H)  Comment: stable  Plan: Will fu as indicated.     (R03.0) Elevated blood pressure reading without diagnosis of hypertension  Comment: normal with recheck  Plan: continue to monitor    (R73.01) Impaired fasting glucose  Comment: improving, great job!  Plan: monitor annually    (Z12.4)  "Screening for cervical cancer  Comment:   Plan: Pap screen with HPV - recommended age 30 - 65         years            (A09) Traveler's diarrhea  Comment: plans to travel to [place] soon  Plan: ciprofloxacin (CIPRO) 500 MG tablet              Patient has been advised of split billing requirements and indicates understanding: Yes    COUNSELING:  Reviewed preventive health counseling, as reflected in patient instructions    Estimated body mass index is 32.7 kg/m  as calculated from the following:    Height as of this encounter: 1.619 m (5' 3.75\").    Weight as of this encounter: 85.7 kg (189 lb).    Weight management plan: Discussed healthy diet and exercise guidelines    She reports that she quit smoking about 31 years ago. Her smoking use included cigarettes. She started smoking about 35 years ago. She has never used smokeless tobacco.      Counseling Resources:  ATP IV Guidelines  Pooled Cohorts Equation Calculator  Breast Cancer Risk Calculator  BRCA-Related Cancer Risk Assessment: FHS-7 Tool  FRAX Risk Assessment  ICSI Preventive Guidelines  Dietary Guidelines for Americans, 2010  USDA's MyPlate  ASA Prophylaxis  Lung CA Screening    Monica Lemus MD  [facility]  "

## 2022-10-13 NOTE — PATIENT INSTRUCTIONS
Use 1/2 hydrogen peroxide/water in your ears.  Lie down on your right side and fill your left ear with the solution. Leave for 5 minutes.  It will bubble and itch. Tilt your head to drain.  Repeat on other side.    Do NOT use shanda pins or qtips in your ears!!     Preventive Health Recommendations  Female Ages 50 - 64    Yearly exam: See your health care provider every year in order to  Review health changes.   Discuss preventive care.    Review your medicines if your doctor has prescribed any.    Get a Pap test every three years (unless you have an abnormal result and your provider advises testing more often).  If you get Pap tests with HPV test, you only need to test every 5 years, unless you have an abnormal result.   You do not need a Pap test if your uterus was removed (hysterectomy) and you have not had cancer.  You should be tested each year for STDs (sexually transmitted diseases) if you're at risk.   Have a mammogram every 1 to 2 years.  Have a colonoscopy at age 50, or have a yearly FIT test (stool test). These exams screen for colon cancer.    Have a cholesterol test every 5 years, or more often if advised.  Have a diabetes test (fasting glucose) every three years. If you are at risk for diabetes, you should have this test more often.   If you are at risk for osteoporosis (brittle bone disease), think about having a bone density scan (DEXA).    Shots: Get a flu shot each year. Get a tetanus shot every 10 years.    Nutrition:   Eat at least 5 servings of fruits and vegetables each day.  Eat whole-grain bread, whole-wheat pasta and brown rice instead of white grains and rice.  Get adequate Calcium and Vitamin D.     Lifestyle  Exercise at least 150 minutes a week (30 minutes a day, 5 days a week). This will help you control your weight and prevent disease.  Limit alcohol to one drink per day.  No smoking.   Wear sunscreen to prevent skin cancer.   See your dentist every six months for an exam and  cleaning.  See your eye doctor every 1 to 2 years.    Preventive Health Recommendations  Female Ages 50 - 64    Yearly exam: See your health care provider every year in order to  Review health changes.   Discuss preventive care.    Review your medicines if your doctor has prescribed any.    Get a Pap test every three years (unless you have an abnormal result and your provider advises testing more often).  If you get Pap tests with HPV test, you only need to test every 5 years, unless you have an abnormal result.   You do not need a Pap test if your uterus was removed (hysterectomy) and you have not had cancer.  You should be tested each year for STDs (sexually transmitted diseases) if you're at risk.   Have a mammogram every 1 to 2 years.  Have a colonoscopy at age 50, or have a yearly FIT test (stool test). These exams screen for colon cancer.    Have a cholesterol test every 5 years, or more often if advised.  Have a diabetes test (fasting glucose) every three years. If you are at risk for diabetes, you should have this test more often.   If you are at risk for osteoporosis (brittle bone disease), think about having a bone density scan (DEXA).    Shots: Get a flu shot each year. Get a tetanus shot every 10 years.    Nutrition:   Eat at least 5 servings of fruits and vegetables each day.  Eat whole-grain bread, whole-wheat pasta and brown rice instead of white grains and rice.  Get adequate Calcium and Vitamin D.     Lifestyle  Exercise at least 150 minutes a week (30 minutes a day, 5 days a week). This will help you control your weight and prevent disease.  Limit alcohol to one drink per day.  No smoking.   Wear sunscreen to prevent skin cancer.   See your dentist every six months for an exam and cleaning.  See your eye doctor every 1 to 2 years.

## 2022-10-17 LAB
BKR LAB AP GYN ADEQUACY: NORMAL
BKR LAB AP GYN INTERPRETATION: NORMAL
BKR LAB AP HPV REFLEX: NORMAL
BKR LAB AP PREVIOUS ABNORMAL: NORMAL
PATH REPORT.COMMENTS IMP SPEC: NORMAL
PATH REPORT.COMMENTS IMP SPEC: NORMAL
PATH REPORT.RELEVANT HX SPEC: NORMAL

## 2022-10-19 LAB
HUMAN PAPILLOMA VIRUS 16 DNA: NEGATIVE
HUMAN PAPILLOMA VIRUS 18 DNA: NEGATIVE
HUMAN PAPILLOMA VIRUS FINAL DIAGNOSIS: NORMAL
HUMAN PAPILLOMA VIRUS OTHER HR: NEGATIVE

## 2022-10-20 NOTE — RESULT ENCOUNTER NOTE
Thank you for getting your cholesterol checked!  Desired or goal levels are:  CHOLESTEROL: Desirable is less than 200.  HDL (Good Cholesterol): Desirable is greater than 40 in men and greater than 50 in women.  LDL (Bad Cholesterol): Desirable is less than 130  TRIGLYCERIDES: Desirable is less than 150.    Consider starting or increasing your aerobic activity; this is the best way to improve HDL (good) cholesterol. Exercise for at least 30 min 5 times per week, and lift weights 2-3 times per week if you can.    As you may know, abnormal cholesterol is one factor that increases your risk for heart disease and stroke. You can improve your cholesterol by controlling the amount and type of fat you eat and by increasing your daily activity level.    Here are some ways to improve your nutrition (adapted from the [employer] handout):  Eat less fat (especially butter, Crisco and other saturated fats)  Buy lean cuts of meat; reduce your portions of red meat or substitute poultry or fish, or avoid meat altogether.  Use skim milk and low-fat dairy products  Eat no more than 4 egg yolks per week  Avoid fried or fast foods that are high in fat  Eat more fruits and vegetables, trying to make your plate of food at least half non-starchy vegetables.    The testing of your blood sugar, liver function and electrolytes was normal.  Kidney function is assessed by blood work that measures creatinine and calculates estimated GFR (glomerular filtration rate).  By current criteria you do have stage 3 chronic kidney disease as your eGFR is < 60.  This is not something that is urgent as your value has not changed much with time.    However, it does mean will monitor your labs (urine and blood tests) every 6-12 months and we will keep trying to make sure your blood pressure and cholesterol are at goal to keep your kidneys as healthy as possible. The good news is that your kidney function as actually slowly improved  with time!     Your lab test to check for diabetes, HgA1C, also called glycosylated hemoglobin, which measures the level of sugar in your blood over the past few months, is slightly higher than normal but less than 6.5. This is good news, it means you don't have diabetes right now!  However, it does mean that you're at risk for developing diabetes in the future.  Trying to reduce carbohydrates in your diet, including bread, pasta, rice and potatoes, and increasing whole grains and vegetables will help lower your A1C.   Exercising 30 minutes every day will lower blood sugars as well and well as improving your HDL cholesterol and lowering your triglycerides.    Your microalbumen is normal, which is great news. This means you do not have protein in the urine, and that your kidneys are currently functioning well. Keeping blood pressure and blood fats low is the best way to preserve your kidney function over your lifetime.     If you have any questions, please contact the clinic or schedule an appointment with me, thank you!      Sincerely,  Dr. Monica Lemus MD  10/20/2022

## 2022-10-31 ENCOUNTER — APPOINTMENT (OUTPATIENT)
Dept: GENERAL RADIOLOGY | Facility: CLINIC | Age: 62
End: 2022-10-31
Attending: FAMILY MEDICINE
Payer: COMMERCIAL

## 2022-10-31 ENCOUNTER — MYC MEDICAL ADVICE (OUTPATIENT)
Dept: FAMILY MEDICINE | Facility: CLINIC | Age: 62
End: 2022-10-31

## 2022-10-31 ENCOUNTER — HOSPITAL ENCOUNTER (EMERGENCY)
Facility: CLINIC | Age: 62
Discharge: HOME | End: 2022-10-31
Attending: EMERGENCY MEDICINE | Admitting: EMERGENCY MEDICINE
Payer: COMMERCIAL

## 2022-10-31 VITALS
WEIGHT: 198 LBS | RESPIRATION RATE: 18 BRPM | TEMPERATURE: 98.2 F | HEIGHT: 65 IN | BODY MASS INDEX: 32.99 KG/M2 | DIASTOLIC BLOOD PRESSURE: 91 MMHG | SYSTOLIC BLOOD PRESSURE: 171 MMHG | HEART RATE: 105 BPM | OXYGEN SATURATION: 96 %

## 2022-10-31 DIAGNOSIS — S42.212A FX HUMERAL NECK, LEFT, CLOSED, INITIAL ENCOUNTER: ICD-10-CM

## 2022-10-31 DIAGNOSIS — S42.212A FX HUMERAL NECK, LEFT, CLOSED, INITIAL ENCOUNTER: Primary | ICD-10-CM

## 2022-10-31 DIAGNOSIS — R11.0 NAUSEA: ICD-10-CM

## 2022-10-31 PROCEDURE — 73030 X-RAY EXAM OF SHOULDER: CPT | Mod: LT

## 2022-10-31 PROCEDURE — 99284 EMERGENCY DEPT VISIT MOD MDM: CPT | Mod: 25 | Performed by: EMERGENCY MEDICINE

## 2022-10-31 PROCEDURE — 23600 CLTX PROX HUMRL FX W/O MNPJ: CPT | Mod: 54 | Performed by: EMERGENCY MEDICINE

## 2022-10-31 PROCEDURE — 23600 CLTX PROX HUMRL FX W/O MNPJ: CPT | Mod: LT | Performed by: EMERGENCY MEDICINE

## 2022-10-31 PROCEDURE — 250N000013 HC RX MED GY IP 250 OP 250 PS 637: Performed by: EMERGENCY MEDICINE

## 2022-10-31 PROCEDURE — 73560 X-RAY EXAM OF KNEE 1 OR 2: CPT | Mod: LT

## 2022-10-31 RX ORDER — OXYCODONE HYDROCHLORIDE 5 MG/1
5 TABLET ORAL ONCE
Status: COMPLETED | OUTPATIENT
Start: 2022-10-31 | End: 2022-10-31

## 2022-10-31 RX ORDER — ACETAMINOPHEN 500 MG
1000 TABLET ORAL ONCE
Status: COMPLETED | OUTPATIENT
Start: 2022-10-31 | End: 2022-10-31

## 2022-10-31 RX ORDER — LIDOCAINE 50 MG/G
2 PATCH TOPICAL EVERY 24 HOURS
Qty: 30 PATCH | Refills: 0 | Status: ON HOLD | OUTPATIENT
Start: 2022-10-31 | End: 2023-08-24

## 2022-10-31 RX ORDER — OXYCODONE HYDROCHLORIDE 5 MG/1
5-10 TABLET ORAL EVERY 6 HOURS PRN
Qty: 30 TABLET | Refills: 0 | Status: SHIPPED | OUTPATIENT
Start: 2022-10-31 | End: 2022-11-04

## 2022-10-31 RX ORDER — LIDOCAINE 50 MG/G
2 PATCH TOPICAL EVERY 24 HOURS
Qty: 30 PATCH | Refills: 0 | Status: SHIPPED | OUTPATIENT
Start: 2022-10-31 | End: 2022-10-31

## 2022-10-31 RX ADMIN — ACETAMINOPHEN 1000 MG: 500 TABLET ORAL at 15:23

## 2022-10-31 RX ADMIN — OXYCODONE HYDROCHLORIDE 5 MG: 5 TABLET ORAL at 16:43

## 2022-10-31 ASSESSMENT — ENCOUNTER SYMPTOMS
ARTHRALGIAS: 1
NUMBNESS: 0
NECK PAIN: 0

## 2022-10-31 ASSESSMENT — ACTIVITIES OF DAILY LIVING (ADL): ADLS_ACUITY_SCORE: 35

## 2022-10-31 NOTE — ED PROVIDER NOTES
[facility] EMERGENCY DEPARTMENT ([facility])    10/31/22          History   No chief complaint on file.    The history is provided by the patient and medical records.     Franchesca Hyatt is a 61 year old female with past medical history significant for CKD stage III, HTN, hyperlipidemia who presents to the ED for evaluation of mechanical fall.  The patient reports that she was unloading groceries when she tripped and fell on fairly flat (though with some dents) cement.  She hit her left shoulder and left knee when she fell.  She denies head injury or LOC.  She states she could not get up because she could not bear weight on her left arm and she was shaky.  Her neighbor was alerted by a dog barking and helped her up.  She denies any symptoms before the fall and thinks that she was just rushing and tripped.  She was wearing boots at that time.  She reports left arm and left knee pain.  No neck pain, chest pain, or other pain.  No numbness or tingling in the bilateral upper or lower extremities.  She rates her pain as a 6/10 after Tylenol.  She denies being anticoagulated.    Past Medical History  Past Medical History:   Diagnosis Date     Calculus of kidney      Calculus, kidney 2007     CKD (chronic kidney disease) stage 3, GFR 30-59 ml/min (H)      Hypercholesteremia 11/29/2018     Hypertension goal BP (blood pressure) < 140/90 11/5/2012     Seasonal allergic rhinitis     seasonal allergies, cats     Tubular adenoma of colon 10/7/2014    Colonoscopy 10/1/2014, repeat 2019     Urinary tract infection, site not specified 1980 to early '90's    Past history of      Past Surgical History:   Procedure Laterality Date     BIOPSY BREAST      X 2     COLONOSCOPY  6/15/2009    Polyp - Due in 5 years     CYSTOSCOPY, RETROGRADES, MANIPULATE STONE, INSERT STENT, COMBINED  2007     EXCISE PILONIDAL CYST, EXTENSIVE       LASER REVOLIX LITHOTRIPSY URETER(S), INSERT STENT, COMBINED  2007     Sebaceous Cyst Removal  2008     Back     lisinopril (ZESTRIL) 10 MG tablet  ACETAMINOPHEN PO  ACIDOPHILUS OR CAPS  Ascorbic Acid (VITAMIN C PO)  ciprofloxacin (CIPRO) 500 MG tablet  Cyanocobalamin (VITAMIN B 12 PO)  DIGEST ENZYMES-ANTICHOLINERGIC OR CAPS  Flaxseed, Linseed, (FLAX SEED OIL PO)  hydrOXYzine (ATARAX) 25 MG tablet  MAGNESIUM 300 MG OR CAPS  Multiple Vitamins-Minerals (ZINC PO)  Nettle, Urtica Dioica, (NETTLE LEAF PO)  order for DME  VITAMIN D OR      Allergies   Allergen Reactions     Seasonal Allergies      Vicodin [Hydrocodone-Acetaminophen]      Family History  Family History   Problem Relation Age of Onset     Hypertension Mother      Cerebrovascular Disease Mother         at age 50     Allergies Mother      Arthritis Mother         osteo     Depression Mother         Treated holistically     Cancer - colorectal Mother         BENIGN polyps 67 ()     Hypertension Father      Allergies Father      Depression Father         Took medication     Cerebrovascular Disease Father      Heart Disease Father         had heart surgery and a stroke      Cancer - colorectal Maternal Grandmother         at age 50     Alzheimer Disease Maternal Grandmother         diag at age 80     Eye Disorder Maternal Grandmother         cateracts     Osteoporosis Maternal Grandmother      Cerebrovascular Disease Maternal Grandmother      Colon Cancer Maternal Grandmother      Cancer Maternal Grandfather         smoker     Cancer Paternal Grandfather         smoker     Hypertension Brother      Depression Sister         Holistically     Gynecology Sister         cervical cancer at age 30     Cancer Sister         cervical     Social History   Social History     Tobacco Use     Smoking status: Former     Packs/day: 0.00     Years: 5.00     Pack years: 0.00     Types: Cigarettes     Start date: 1986     Quit date: 3/12/1991     Years since quittin.6     Smokeless tobacco: Never     Tobacco comments:     Was a light smoker for a few years in my  youth.   Vaping Use     Vaping Use: Never used   Substance Use Topics     Alcohol use: Yes     Comment: rare - one glass per week of wine     Drug use: No      Past medical history, past surgical history, medications, allergies, family history, and social history were reviewed with the patient. No additional pertinent items.       Review of Systems   Cardiovascular: Negative for chest pain.   Musculoskeletal: Positive for arthralgias. Negative for neck pain.   Neurological: Negative for numbness.     A complete review of systems was performed with pertinent positives and negatives noted in the HPI, and all other systems negative.    Physical Exam      Physical Exam  Vitals and nursing note reviewed.   Constitutional:       General: She is not in acute distress.     Appearance: Normal appearance. She is not diaphoretic.   HENT:      Head: Atraumatic.      Mouth/Throat:      Pharynx: No oropharyngeal exudate.   Eyes:      General: No scleral icterus.     Pupils: Pupils are equal, round, and reactive to light.   Cardiovascular:      Rate and Rhythm: Normal rate and regular rhythm.      Heart sounds: Normal heart sounds.   Pulmonary:      Effort: No respiratory distress.      Breath sounds: Normal breath sounds.   Abdominal:      General: Bowel sounds are normal.      Palpations: Abdomen is soft.      Tenderness: There is no abdominal tenderness.   Musculoskeletal:         General: Tenderness present. No deformity.      Comments: Limited range of motion left upper extremity secondary to pain   Skin:     General: Skin is warm.      Findings: No rash.   Neurological:      General: No focal deficit present.      Mental Status: She is alert and oriented to person, place, and time.         ED Course     4:13 PM  The patient was seen and examined by Rufino Guan MD in [facility].     Procedures                 No results found for any visits on 10/31/22.  Medications - No data to display     Assessments & Plan (with Medical  Decision Making)     61 year old female with past medical history significant for CKD stage III, HTN, hyperlipidemia who presents to the ED for evaluation of mechanical fall.  Vital signs in triage notable for elevated blood pressure 150/91 with otherwise afebrile and stable including normal pulse ox 100% on room air.  Patient was given acetaminophen 1 g p.o. and sent to x-ray for imaging of the shoulder and humerus, these studies revealed acute comminuted surgical neck fracture with secondary fracture to the greater tuberosity.  X-ray of the left knee also obtained which reveals no evidence of fracture but with trace joint effusion.  Case discussed with orthopedics who reviewed the imaging over the phone and recommended sling analgesics and close follow-up with orthopedic clinic.  Ortho clinic referral placed and patient expressed verbal understanding around anticipatory guidance return precautions to the emergency room.    I have reviewed the nursing notes. I have reviewed the findings, diagnosis, plan and need for follow up with the patient.    New Prescriptions    No medications on file       Final diagnoses:   None     I, Bety Whaley, am serving as a trained medical scribe to document services personally performed by Rufino Guan MD based on the provider's statements to me on October 31, 2022.  This document has been checked and approved by the attending provider.    IRufino MD, was physically present and have reviewed and verified the accuracy of this note documented by Bety Whaley medical scribe.      --  Rufino Guan MD  [facility] EMERGENCY DEPARTMENT  10/31/2022     Rufino Guan MD  11/01/22 1108

## 2022-10-31 NOTE — DISCHARGE INSTRUCTIONS
Please follow-up with your primary care provider regarding question of ibuprofen prescription in the setting of your underlying kidney disease.  Please also expect a phone call from our orthopedic clinic  service to schedule follow-up appointment with a shoulder specialist for your fracture.

## 2022-10-31 NOTE — TREATMENT PLAN
Orthopedic/Sports Medicine Fracture Triage    Incoming call/or message from orders pager.    Fracture type: Humerus.    The patient is in a  splint.    Date of injury 10/31/2022.    Triaged by: Dr. Mancini.    Determined to be managed Surgically.    Needs to be seen ASAP.    Additional Comments/information: Staff message sent to ortho coordinators to schedule as patient is still in the ED.    Mary Peter, ATC

## 2022-10-31 NOTE — ED TRIAGE NOTES
Patient presents due to tripping while unloading groceries and falling on concrete. Patient hit left shoulder and left knee when she fell. Patient denies hitting head. Patient reports 6/10 dull pain that increases with movement.      Triage Assessment     Row Name 10/31/22 1246       Triage Assessment (Adult)    Airway WDL WDL       Respiratory WDL    Respiratory WDL WDL       Skin Circulation/Temperature WDL    Skin Circulation/Temperature WDL WDL       Cardiac WDL    Cardiac WDL WDL       Peripheral/Neurovascular WDL    Peripheral Neurovascular WDL WDL       Cognitive/Neuro/Behavioral WDL    Cognitive/Neuro/Behavioral WDL WDL

## 2022-11-01 RX ORDER — ONDANSETRON 4 MG/1
4 TABLET, ORALLY DISINTEGRATING ORAL EVERY 8 HOURS PRN
Qty: 20 TABLET | Refills: 0 | Status: ON HOLD | OUTPATIENT
Start: 2022-11-01 | End: 2023-08-24

## 2022-11-01 NOTE — TELEPHONE ENCOUNTER
" Providers/ Dr. Lemus-    Patient seen in ER yesterday 10/31 for fractured humerus    1. They recommended ortho follow-up and said they placed a referral- but it does not appear they did.    Pended referral    From ER Notes \" Case discussed with orthopedics who reviewed the imaging over the phone and recommended sling analgesics and close follow-up with orthopedic clinic.  Ortho clinic referral placed .\"    2. Pt was prescribed oxycodone for pain mgmt, she needs to take this as she is in severe pain, but it is making her very nauseas. She is requesting an antiemetic. Pended Zofran to pharmacy    Dr. Lemus not in clinic until Thurs.    Triage- please sent pt myChart with ortho scheduling and confirmation whether antiemetic was sent in    MARY Mart RN  [facility]    "

## 2022-11-03 ENCOUNTER — TELEPHONE (OUTPATIENT)
Dept: FAMILY MEDICINE | Facility: CLINIC | Age: 62
End: 2022-11-03

## 2022-11-03 NOTE — PROGRESS NOTES
CHIEF COMPLAINT: left shoulder pain    DIAGNOSIS: Left shoulder three-part proximal humerus fracture    OCCUPATION/SPORT: self employed healer/     HPI:   Franchesca Hyatt is a very pleasant 61 year old, right-hand dominant female who presents for evaluation of left shoulder pain.  Symptoms started on 10/31/22 when patient tripped and fell on cement while unloading groceries.  She hit her left shoulder and left knee when she fell, and states she could not get up because she could not bear weight on her left arm. She was evaluated at ED on day of injury. The pain is located to the lateral aspect of shoulder and upper arm. Worst pain is rated a 9-10 of 10, and current pain is rated at 7 of 10.  Patient currently taking oxycodone, Tylenol, unable to take anti-inflammatories due to chronic kidney disease.  Patient currently working as a healer and .  Patient lives alone and has friends and neighbors for assistance.  No other concerns or complaints at this time.      PAST MEDICAL HISTORY:  Past Medical History:   Diagnosis Date     Calculus of kidney      Calculus, kidney 2007     CKD (chronic kidney disease) stage 3, GFR 30-59 ml/min (H)      Hypercholesteremia 11/29/2018     Hypertension goal BP (blood pressure) < 140/90 11/5/2012     Seasonal allergic rhinitis     seasonal allergies, cats     Tubular adenoma of colon 10/7/2014    Colonoscopy 10/1/2014, repeat 2019     Urinary tract infection, site not specified 1980 to early '90's    Past history of        PAST SURGICAL HISTORY:  Past Surgical History:   Procedure Laterality Date     BIOPSY BREAST      X 2     COLONOSCOPY  6/15/2009    Polyp - Due in 5 years     CYSTOSCOPY, RETROGRADES, MANIPULATE STONE, INSERT STENT, COMBINED  2007     EXCISE PILONIDAL CYST, EXTENSIVE       LASER REVOLIX LITHOTRIPSY URETER(S), INSERT STENT, COMBINED  2007     Sebaceous Cyst Removal  2008    Back       CURRENT MEDICATIONS:  Current Outpatient Medications    Medication Sig Dispense Refill     ACETAMINOPHEN PO Take 500 mg by mouth every 6 hours as needed       ACIDOPHILUS OR CAPS 2 caps. daily (Patient not taking: No sig reported)  0     Ascorbic Acid (VITAMIN C PO)        ciprofloxacin (CIPRO) 500 MG tablet Take 1 tablet (500 mg) by mouth 2 times daily 6 tablet 0     Cyanocobalamin (VITAMIN B 12 PO) Take by mouth daily        DIGEST ENZYMES-ANTICHOLINERGIC OR CAPS  (Patient not taking: Reported on 10/13/2022)       Flaxseed, Linseed, (FLAX SEED OIL PO) Take by mouth daily  (Patient not taking: Reported on 10/13/2022)       hydrOXYzine (ATARAX) 25 MG tablet Take 1/2-2 tabs po HS prn itching 30 tablet 1     lidocaine (LIDODERM) 5 % patch Place 2 patches onto the skin every 24 hours To prevent lidocaine toxicity, patient should be patch free for 12 hrs daily. 30 patch 0     lisinopril (ZESTRIL) 10 MG tablet TAKE 1 TABLET(10 MG) BY MOUTH TWICE DAILY 180 tablet 0     MAGNESIUM 300 MG OR CAPS At Bedtime        Multiple Vitamins-Minerals (ZINC PO) Take by mouth daily ZINC        Nettle, Urtica Dioica, (NETTLE LEAF PO)        ondansetron (ZOFRAN ODT) 4 MG ODT tab Take 1 tablet (4 mg) by mouth every 8 hours as needed for nausea 20 tablet 0     order for DME Equipment being ordered: SAD lite box 1 each 0     oxyCODONE (ROXICODONE) 5 MG tablet Take 1-2 tablets (5-10 mg) by mouth every 6 hours as needed for pain 30 tablet 0     VITAMIN D OR 1 cap daily in the winter         ALLERGIES:      Allergies   Allergen Reactions     Seasonal Allergies      Vicodin [Hydrocodone-Acetaminophen]          FAMILY HISTORY: No pertinent family history, reviewed in EMR.    SOCIAL HISTORY:   Social History     Socioeconomic History     Marital status:      Spouse name: Henry Elais     Number of children: 1     Years of education: BA     Highest education level: Not on file   Occupational History     Occupation:      Employer: SELF   Tobacco Use     Smoking status: Former      Packs/day: 0.00     Years: 5.00     Pack years: 0.00     Types: Cigarettes     Start date: 1986     Quit date: 3/12/1991     Years since quittin.6     Smokeless tobacco: Never     Tobacco comments:     Was a light smoker for a few years in my youth.   Vaping Use     Vaping Use: Never used   Substance and Sexual Activity     Alcohol use: Yes     Comment: rare - one glass per week of wine     Drug use: No     Sexual activity: Not Currently     Partners: Male     Birth control/protection: Post-menopausal   Other Topics Concern      Service No     Blood Transfusions No     Caffeine Concern Not Asked     Occupational Exposure Not Asked     Hobby Hazards Not Asked     Sleep Concern Not Asked     Stress Concern Not Asked     Weight Concern Not Asked     Special Diet Not Asked     Back Care Not Asked     Exercise Yes     Bike Helmet Not Asked     Seat Belt Yes     Self-Exams Yes     Parent/sibling w/ CABG, MI or angioplasty before 65F 55M? No   Social History Narrative    Lives alone, has step son lives with her every other weekend         10-    Balanced Diet - Yes    Osteoporosis Prevention Measures - Dairy servings per day: 2    Regular Exercise -  Yes Describe yoga,walking    Dental Exam - YES - Date:     Eye Exam - about 18 months ago    Self Breast Exam - Yes    Abuse: Current or Past (Physical, Sexual or Emotional)- No    Do you feel safe in your environment - Yes    Guns stored in the home - No    Sunscreen used - Yes    Seatbelts used - Yes    Lipids -  10-    Glucose -  10-    Colon Cancer Screening - Colonoscopy 06-(date completed)    Hemoccults - NO    Pap Test -  2007    Do you have any concerns about STD's -  No    Mammography - YES - Date: 2009    DEXA - NO    Immunizations reviewed and up to date - Yes, 2003         Social Determinants of Health     Financial Resource Strain: Not on file   Food Insecurity: Not on file    Transportation Needs: Not on file   Physical Activity: Not on file   Stress: Not on file   Social Connections: Not on file   Intimate Partner Violence: Not on file   Housing Stability: Not on file       REVIEW OF SYSTEMS: Positive for that noted in past medical history and history of present illness and otherwise reviewed in EMR    PHYSICAL EXAM:  Patient is Data Unavailable and weighs 196 lbs 0 oz Wt 88.9 kg (196 lb)   LMP 06/09/2014   BMI 33.12 kg/m    Body mass index is 33.12 kg/m .   Constitutional: Well-developed, well-nourished, healthy appearing female.  Skin: Warm, dry   HEENT: Normal  Cardiac: Well perfused extremities, strong 2+ peripheral pulses. No edema.   Pulmonary: Breathing room air    Musculoskeletal:   Left Shoulder:  Fires axillary nerve  Expected bruising   Distally neurovascularly intact    X-RAYS:   I personally reviewed the prior left shoulder x-rays which demonstrates that there is a three-part proximal humerus fracture, difficult to fully assess the greater tuberosity fracture fragment based on these x-rays, potentially looks minimally displaced.    ADVANCED IMAGING:     IMPRESSION: 61 year old-year-old right hand dominant female, with left proximal humerus fracture.     PLAN:     I discussed with the patient the etiology of their condition. We discussed at length the options as noted above.  I discussed with the patient that I would like to obtain a CT scan for further evaluation of the proximal humerus fracture specifically of the greater tuberosity component.  We discussed that based on the results of the CT scanning I can make a better determination on whether or not this would be appropriately treated with nonoperative versus operative intervention.  For now we discussed continuing with the sling immobilization, new sling will be given today, icing of the shoulder, oxycodone and Tylenol for pain control.  We also discussed long-term implications from a proximal humerus fracture  including potential for pain, nonunion, stiffness, shoulder dysfunction, need for future surgery, avascular necrosis.  I will follow-up with the patient after the CT scan is done either in person or with a virtual visit.    At the conclusion of the office visit, Franchesca verbally acknowledged that I answered all of her questions satisfactorily.    Priti Geronimo MD  Orthopedic Surgery Sports Medicine and Shoulder Surgery

## 2022-11-03 NOTE — TELEPHONE ENCOUNTER
Writer called patient and reviewed message per Dr. Lemus.    Patient verbalized understanding and in agreement with plan.    Answered patient's questions regarding medications: anti-emetic, pain and stool softener to the best of writer's knowledge.    Encouraged patient to call triage back with any questions or concerns/new/changing/worsening symptoms.  Reviewed after hours nurse triage coverage.    JAEL ReavesN, RN-[facility]

## 2022-11-03 NOTE — TELEPHONE ENCOUNTER
HI, it looks like she was prescribed oxycodone 5 mg, 1-2 tablets every 6 hours as needed for pain, #30, and she can certainly also take acetaminophen 1000 mg every 6 hours as well. She has an ortho appointment tomorrow. I reviewed PDMP, this prescription was filled on 10/31/22, she was in the ER around 4 pm .  Even if she's been taking the oxycodone at maximum dose as prescribed it should last until her appointment with ortho tomorrow. If she's having more questions or needs something more than this, she could have a virtual visit for pain management.    Sincerely,  Dr. Monica Lemus MD  11/3/2022

## 2022-11-03 NOTE — TELEPHONE ENCOUNTER
FYI - Status Update    Who is Calling: patient    Update: pt is wondering what to do with her pain.  She cannot take Motrin because of her kidneys.  She took Oxy that was ordered and Tylenol but the paper from the ED said to not take Tylenol with Oxy.  Pt is trying not to take Oxy but the pain is so bad unsure what to do.  Pt has appt tomorrow with Ortho.      Can PCP please advise pt how to proceed?  Pt is in tears.  Pt said she received no pain meds in ED and so has not ever gotten on track with pain control.     Does caller want a call/response back: Yes     Could we send this information to you in [employer] or would you prefer to receive a phone call?:   Patient would prefer a phone call     Okay to leave a detailed message?: Yes at Cell number on file:    Telephone Information:   Mobile [phone]     Call taken on 11/3/22 at 1139 am by DANIEL Toledo

## 2022-11-04 ENCOUNTER — HOSPITAL ENCOUNTER (OUTPATIENT)
Dept: CT IMAGING | Facility: CLINIC | Age: 62
Discharge: HOME | End: 2022-11-04
Attending: ORTHOPAEDIC SURGERY | Admitting: ORTHOPAEDIC SURGERY
Payer: COMMERCIAL

## 2022-11-04 ENCOUNTER — TELEPHONE (OUTPATIENT)
Dept: ORTHOPEDICS | Facility: CLINIC | Age: 62
End: 2022-11-04

## 2022-11-04 ENCOUNTER — OFFICE VISIT (OUTPATIENT)
Dept: ORTHOPEDICS | Facility: CLINIC | Age: 62
End: 2022-11-04
Payer: COMMERCIAL

## 2022-11-04 VITALS — WEIGHT: 196 LBS | BODY MASS INDEX: 33.12 KG/M2

## 2022-11-04 DIAGNOSIS — S42.292A OTHER CLOSED DISPLACED FRACTURE OF PROXIMAL END OF LEFT HUMERUS, INITIAL ENCOUNTER: ICD-10-CM

## 2022-11-04 DIAGNOSIS — S42.292A OTHER CLOSED DISPLACED FRACTURE OF PROXIMAL END OF LEFT HUMERUS, INITIAL ENCOUNTER: Primary | ICD-10-CM

## 2022-11-04 DIAGNOSIS — S42.212A FX HUMERAL NECK, LEFT, CLOSED, INITIAL ENCOUNTER: ICD-10-CM

## 2022-11-04 PROCEDURE — 99204 OFFICE O/P NEW MOD 45 MIN: CPT | Performed by: ORTHOPAEDIC SURGERY

## 2022-11-04 PROCEDURE — 73200 CT UPPER EXTREMITY W/O DYE: CPT | Mod: LT

## 2022-11-04 NOTE — PATIENT INSTRUCTIONS
[facility]   [address] [place] [address], [address]  [place] [address] [address] [place] [address]   Appointments: [phone] Appointments: [phone]   Fax: [phone] Fax: [phone]       An order for a CT was placed today. This is scheduled for 12:20pm at [facility] in [place].     [facility]  [address] [place] [address]    Parking  Imaging customers can park in the hospital s front lot. Enter from [address]. As you approach the front of the hospital, look for the Visitor Parking sign on your right and turn there.    Entrance and check-in location  Enter through the main hospital entrance, facing [address] (pictured to the right). As you walk in the main entrance (off [address]) , please check in at the registration desk just inside the door. After you register, you will be directed to the Imaging Services department to a waiting area until it is time for your appointment.    Please contact my office with any questions, [phone].

## 2022-11-04 NOTE — TELEPHONE ENCOUNTER
Left VM for patient to return call to schedule follow up visit with Dr. Geronimo for Monday 11/7/22. If patient returns call please assist in scheduling virtual or clinic appointment.     Vicky Park MSA, ATC  Certified Athletic Trainer

## 2022-11-04 NOTE — LETTER
11/4/2022         RE: Franchesca Hyatt  [address]  [facility] [address]        Dear Colleague,    Thank you for referring your patient, Franchesca Hyatt, to the [facility] [place]. Please see a copy of my visit note below.    CHIEF COMPLAINT: left shoulder pain    DIAGNOSIS: Left shoulder three-part proximal humerus fracture    OCCUPATION/SPORT: self employed healer/     HPI:   Franchesca Hyatt is a very pleasant 61 year old, right-hand dominant female who presents for evaluation of left shoulder pain.  Symptoms started on 10/31/22 when patient tripped and fell on cement while unloading groceries.  She hit her left shoulder and left knee when she fell, and states she could not get up because she could not bear weight on her left arm. She was evaluated at ED on day of injury. The pain is located to the lateral aspect of shoulder and upper arm. Worst pain is rated a 9-10 of 10, and current pain is rated at 7 of 10.  Patient currently taking oxycodone, Tylenol, unable to take anti-inflammatories due to chronic kidney disease.  Patient currently working as a healer and .  Patient lives alone and has friends and neighbors for assistance.  No other concerns or complaints at this time.      PAST MEDICAL HISTORY:  Past Medical History:   Diagnosis Date     Calculus of kidney      Calculus, kidney 2007     CKD (chronic kidney disease) stage 3, GFR 30-59 ml/min (H)      Hypercholesteremia 11/29/2018     Hypertension goal BP (blood pressure) < 140/90 11/5/2012     Seasonal allergic rhinitis     seasonal allergies, cats     Tubular adenoma of colon 10/7/2014    Colonoscopy 10/1/2014, repeat 2019     Urinary tract infection, site not specified 1980 to early '90's    Past history of        PAST SURGICAL HISTORY:  Past Surgical History:   Procedure Laterality Date     BIOPSY BREAST      X 2     COLONOSCOPY  6/15/2009    Polyp - Due in 5 years     CYSTOSCOPY,  RETROGRADES, MANIPULATE STONE, INSERT STENT, COMBINED  2007     EXCISE PILONIDAL CYST, EXTENSIVE       LASER REVOLIX LITHOTRIPSY URETER(S), INSERT STENT, COMBINED  2007     Sebaceous Cyst Removal  2008    Back       CURRENT MEDICATIONS:  Current Outpatient Medications   Medication Sig Dispense Refill     ACETAMINOPHEN PO Take 500 mg by mouth every 6 hours as needed       ACIDOPHILUS OR CAPS 2 caps. daily (Patient not taking: No sig reported)  0     Ascorbic Acid (VITAMIN C PO)        ciprofloxacin (CIPRO) 500 MG tablet Take 1 tablet (500 mg) by mouth 2 times daily 6 tablet 0     Cyanocobalamin (VITAMIN B 12 PO) Take by mouth daily        DIGEST ENZYMES-ANTICHOLINERGIC OR CAPS  (Patient not taking: Reported on 10/13/2022)       Flaxseed, Linseed, (FLAX SEED OIL PO) Take by mouth daily  (Patient not taking: Reported on 10/13/2022)       hydrOXYzine (ATARAX) 25 MG tablet Take 1/2-2 tabs po HS prn itching 30 tablet 1     lidocaine (LIDODERM) 5 % patch Place 2 patches onto the skin every 24 hours To prevent lidocaine toxicity, patient should be patch free for 12 hrs daily. 30 patch 0     lisinopril (ZESTRIL) 10 MG tablet TAKE 1 TABLET(10 MG) BY MOUTH TWICE DAILY 180 tablet 0     MAGNESIUM 300 MG OR CAPS At Bedtime        Multiple Vitamins-Minerals (ZINC PO) Take by mouth daily ZINC        Nettle, Urtica Dioica, (NETTLE LEAF PO)        ondansetron (ZOFRAN ODT) 4 MG ODT tab Take 1 tablet (4 mg) by mouth every 8 hours as needed for nausea 20 tablet 0     order for DME Equipment being ordered: SAD lite box 1 each 0     oxyCODONE (ROXICODONE) 5 MG tablet Take 1-2 tablets (5-10 mg) by mouth every 6 hours as needed for pain 30 tablet 0     VITAMIN D OR 1 cap daily in the winter         ALLERGIES:      Allergies   Allergen Reactions     Seasonal Allergies      Vicodin [Hydrocodone-Acetaminophen]          FAMILY HISTORY: No pertinent family history, reviewed in EMR.    SOCIAL HISTORY:   Social History     Socioeconomic History      Marital status:      Spouse name: Henry Elias     Number of children: 1     Years of education: BA     Highest education level: Not on file   Occupational History     Occupation:      Employer: SELF   Tobacco Use     Smoking status: Former     Packs/day: 0.00     Years: 5.00     Pack years: 0.00     Types: Cigarettes     Start date: 1986     Quit date: 3/12/1991     Years since quittin.6     Smokeless tobacco: Never     Tobacco comments:     Was a light smoker for a few years in my youth.   Vaping Use     Vaping Use: Never used   Substance and Sexual Activity     Alcohol use: Yes     Comment: rare - one glass per week of wine     Drug use: No     Sexual activity: Not Currently     Partners: Male     Birth control/protection: Post-menopausal   Other Topics Concern      Service No     Blood Transfusions No     Caffeine Concern Not Asked     Occupational Exposure Not Asked     Hobby Hazards Not Asked     Sleep Concern Not Asked     Stress Concern Not Asked     Weight Concern Not Asked     Special Diet Not Asked     Back Care Not Asked     Exercise Yes     Bike Helmet Not Asked     Seat Belt Yes     Self-Exams Yes     Parent/sibling w/ CABG, MI or angioplasty before 65F 55M? No   Social History Narrative    Lives alone, has step son lives with her every other weekend         10-    Balanced Diet - Yes    Osteoporosis Prevention Measures - Dairy servings per day: 2    Regular Exercise -  Yes Describe yoga,walking    Dental Exam - YES - Date:     Eye Exam - about 18 months ago    Self Breast Exam - Yes    Abuse: Current or Past (Physical, Sexual or Emotional)- No    Do you feel safe in your environment - Yes    Guns stored in the home - No    Sunscreen used - Yes    Seatbelts used - Yes    Lipids -  10-    Glucose -  10-    Colon Cancer Screening - Colonoscopy 06-(date completed)    Hemoccults - NO    Pap Test -  2007    Do you have  any concerns about STD's -  No    Mammography - YES - Date: 03-    DEXA - NO    Immunizations reviewed and up to date - Yes, 04-         Social Determinants of Health     Financial Resource Strain: Not on file   Food Insecurity: Not on file   Transportation Needs: Not on file   Physical Activity: Not on file   Stress: Not on file   Social Connections: Not on file   Intimate Partner Violence: Not on file   Housing Stability: Not on file       REVIEW OF SYSTEMS: Positive for that noted in past medical history and history of present illness and otherwise reviewed in EMR    PHYSICAL EXAM:  Patient is Data Unavailable and weighs 196 lbs 0 oz Wt 88.9 kg (196 lb)   LMP 06/09/2014   BMI 33.12 kg/m    Body mass index is 33.12 kg/m .   Constitutional: Well-developed, well-nourished, healthy appearing female.  Skin: Warm, dry   HEENT: Normal  Cardiac: Well perfused extremities, strong 2+ peripheral pulses. No edema.   Pulmonary: Breathing room air    Musculoskeletal:   Left Shoulder:  Fires axillary nerve  Expected bruising   Distally neurovascularly intact    X-RAYS:   I personally reviewed the prior left shoulder x-rays which demonstrates that there is a three-part proximal humerus fracture, difficult to fully assess the greater tuberosity fracture fragment based on these x-rays, potentially looks minimally displaced.    ADVANCED IMAGING:     IMPRESSION: 61 year old-year-old right hand dominant female, with left proximal humerus fracture.     PLAN:     I discussed with the patient the etiology of their condition. We discussed at length the options as noted above.  I discussed with the patient that I would like to obtain a CT scan for further evaluation of the proximal humerus fracture specifically of the greater tuberosity component.  We discussed that based on the results of the CT scanning I can make a better determination on whether or not this would be appropriately treated with nonoperative versus  operative intervention.  For now we discussed continuing with the sling immobilization, new sling will be given today, icing of the shoulder, oxycodone and Tylenol for pain control.  We also discussed long-term implications from a proximal humerus fracture including potential for pain, nonunion, stiffness, shoulder dysfunction, need for future surgery, avascular necrosis.  I will follow-up with the patient after the CT scan is done either in person or with a virtual visit.    At the conclusion of the office visit, Franchesca verbally acknowledged that I answered all of her questions satisfactorily.    Priti Ugarte MD  Orthopedic Surgery Sports Medicine and Shoulder Surgery      Again, thank you for allowing me to participate in the care of your patient.        Sincerely,        PRITI UGARTE MD

## 2022-11-07 ENCOUNTER — VIRTUAL VISIT (OUTPATIENT)
Dept: ORTHOPEDICS | Facility: CLINIC | Age: 62
End: 2022-11-07
Payer: COMMERCIAL

## 2022-11-07 VITALS — HEIGHT: 65 IN | BODY MASS INDEX: 32.65 KG/M2 | WEIGHT: 196 LBS

## 2022-11-07 DIAGNOSIS — S42.292A OTHER CLOSED DISPLACED FRACTURE OF PROXIMAL END OF LEFT HUMERUS, INITIAL ENCOUNTER: ICD-10-CM

## 2022-11-07 DIAGNOSIS — M25.512 ACUTE PAIN OF LEFT SHOULDER: Primary | ICD-10-CM

## 2022-11-07 DIAGNOSIS — S42.212A FX HUMERAL NECK, LEFT, CLOSED, INITIAL ENCOUNTER: Primary | ICD-10-CM

## 2022-11-07 PROCEDURE — 99214 OFFICE O/P EST MOD 30 MIN: CPT | Mod: 95 | Performed by: ORTHOPAEDIC SURGERY

## 2022-11-07 NOTE — PATIENT INSTRUCTIONS
[facility] [address], [address]  [address] [address]   Appointments: [phone] Appointments: [phone]   Fax: [phone] Fax: [phone]       Physical Therapy:   Orders for physical therapy have been placed. Please call [phone] to schedule at your convenience.     Please contact my office with any questions, [phone].

## 2022-11-07 NOTE — PROGRESS NOTES
"Franchesca is a 61 year old who is being evaluated via a billable video visit.      How would you like to obtain your AVS? MyChart  If the video visit is dropped, the invitation should be resent by: [email or web address]    Will anyone else be joining your video visit? No        Video-Visit Details     Video Start Time: 944am    Type of service:  Video Visit    Video End Time:1012am    Originating Location (pt. Location): Home        Distant Location (provider location):  On-site    Platform used for Video Visit: Lupe    CHIEF COMPLAINT: left shoulder pain    DIAGNOSIS: Left shoulder three-part proximal humerus fracture    OCCUPATION/SPORT: self employed healer/     HPI:   Franchesca Hyatt is a very pleasant 61 year old, right-hand dominant female who presents for follow up of left shoulder CT imaging. Patient was previously evaluated on 11/4/22.  Patient has stopped taking oxycodone.  Patient only using Tylenol and CBD.  Feels somewhat uncomfortable trying to navigate the sling on.  Original date of injury was 10/31/2022    REVIEW OF SYSTEMS: Positive for that noted in past medical history and history of present illness and otherwise reviewed in EMR    PHYSICAL EXAM:  Patient is 5' 4.5\" and weighs 196 lbs 0 oz Ht 1.638 m (5' 4.5\")   Wt 88.9 kg (196 lb)   LMP 06/09/2014   BMI 33.12 kg/m    Body mass index is 33.12 kg/m .   Constitutional: Well-developed, well-nourished, healthy appearing female.  Skin: Warm, dry   HEENT: Normal  Cardiac: Well perfused extremities, strong 2+ peripheral pulses. No edema.   Pulmonary: Breathing room air    ADVANCED IMAGING: I personally reviewed the CT scan which showed that there is a comminuted fracture that extends to the surgical neck, lesser tuberosity, the greater tuberosity.  Displacement three-part proximal humerus fracture.  The greater tuberosity fracture fragments intermittently displaced however there is one posterior fragment.his larger and " displaced.    IMPRESSION: 61 year old-year-old right hand dominant female, with left proximal humerus fracture.     PLAN:     I discussed with the patient the etiology of their condition. We discussed at length the options as noted above.  I went through the results of the CT with this patient today.  We discussed that overall there is appropriate alignment of the surgical neck, lesser tuberosity however the greater tuberosity does have a posterior fragment that is more displaced.  We discussed that this fragment would be the insertion sites of the infraspinatus and teres minor.  Talked about options including conservative versus surgical intervention.  I discussed with the patient that the displacement of this posterior fragment would meet criteria for surgical intervention, the potential downsides of nonoperative treatment would include dysfunction of the caries minor and infraspinatus due to altered length tension relationship, potential for impingement, nonunion, potential for decreased range of motion and pain.  Alternatively we discussed the option of open reduction internal fixation, we discussed the goal of this would be to primarily align the greater tuberosity fragment better which could give overall better shoulder functioning.  I went through the risks and benefits of surgery.  At this point given that it is the patient's nondominant arm inflammatory fractures overall fairly well-maintained the patient wishes to pursue nonoperative treatment.  We discussed continuing with sling immobilization, icing, use of over-the-counter analgesics with Tylenol as patient unable to take anti-inflammatories.  We also like to submit a physical therapy referral so that the patient get sling education to feel uncomfortable.  Discussed that we will start formal physical therapy in a few weeks time.  I will follow-up the patient another 2 weeks with repeat x-rays.  At the conclusion of the office visit, Franchesca verbally  acknowledged that I answered all of her questions satisfactorily.    Priti Geronimo MD  Orthopedic Surgery Sports Medicine and Shoulder Surgery

## 2022-11-07 NOTE — TELEPHONE ENCOUNTER
Patient scheduled an appointment with Dr. Geronimo and was seen today. Closing encounter.     JOSE ALBERTO Reaves RN

## 2022-11-07 NOTE — PROGRESS NOTES
CHIEF COMPLAINT:  Follow up Left proximal humerus fracture DOI: 10/31/22     HISTORY OF PRESENT ILLNESS: Franchesca is an extremely pleasant 61 year-old right-hand dominant female who presents for follow up pf left shoulder proximal humerus fracture.  Patient is doing a lot better since last visit, pain is now well controlled with Tylenol and CBD cream.  Patient has adjusted better to the sling.    EXAM:  Pleasant adult 61 year old in no distress.  Respirations even and unlabored.  Left upper extremity:  Sling in place, skin closed, neurovascularly intact including firing of the axillary nerve    DATA REVIEW:  Grashey, scapular Y, axillary, Zanca view x-rays of the left shoulder were ordered and reviewed today which show that there is a surgical neck and greater tuberosity proximal humerus fracture, there is been no significant interval displacement.     ASSESSMENT:  1.  Three-part left proximal humerus fracture date of injury 10/31/2022, nonoperative treatment    PLAN:  Discussed with the patient that the x-rays are stable.  Patient had elected for nonoperative treatment after our discussion based on the results of the CT scan.  This point we will continue with conservative treatment measures.  Patient is scheduled start some physical therapy tomorrow on the nonoperative proximal humerus protocol, I also reached out to the patient's physical therapist to discuss going slow with physical therapy until the 6-week rigo.  We discussed that sling wear will be 7 weeks total, continue to be nonweightbearing.  Patient will continue over-the-counter analgesics as needed for pain.  I will follow-up with the patient approximately 1 month's time with repeat x-rays.    Priti Geronimo  Orthopedic Surgery Sports Medicine and Shoulder Surgery

## 2022-11-07 NOTE — LETTER
"    11/7/2022         RE: Franchesca Hyatt  [address]  [place] [address]        Dear Colleague,    Thank you for referring your patient, Franchesca Hyatt, to the [facility] [place]. Please see a copy of my visit note below.    Franchesca is a 61 year old who is being evaluated via a billable video visit.      How would you like to obtain your AVS? MyChart  If the video visit is dropped, the invitation should be resent by: [email or web address]    Will anyone else be joining your video visit? No        Video-Visit Details     Video Start Time: 944am    Type of service:  Video Visit    Video End Time:1012am    Originating Location (pt. Location): Home        Distant Location (provider location):  On-site    Platform used for Video Visit: Lupe    CHIEF COMPLAINT: left shoulder pain    DIAGNOSIS: Left shoulder three-part proximal humerus fracture    OCCUPATION/SPORT: self employed healer/     HPI:   Franchesca Hyatt is a very pleasant 61 year old, right-hand dominant female who presents for follow up of left shoulder CT imaging. Patient was previously evaluated on 11/4/22.  Patient has stopped taking oxycodone.  Patient only using Tylenol and CBD.  Feels somewhat uncomfortable trying to navigate the sling on.  Original date of injury was 10/31/2022    REVIEW OF SYSTEMS: Positive for that noted in past medical history and history of present illness and otherwise reviewed in EMR    PHYSICAL EXAM:  Patient is 5' 4.5\" and weighs 196 lbs 0 oz Ht 1.638 m (5' 4.5\")   Wt 88.9 kg (196 lb)   LMP 06/09/2014   BMI 33.12 kg/m    Body mass index is 33.12 kg/m .   Constitutional: Well-developed, well-nourished, healthy appearing female.  Skin: Warm, dry   HEENT: Normal  Cardiac: Well perfused extremities, strong 2+ peripheral pulses. No edema.   Pulmonary: Breathing room air    ADVANCED IMAGING: I personally reviewed the CT scan which showed that there is a comminuted fracture that " extends to the surgical neck, lesser tuberosity, the greater tuberosity.  Displacement three-part proximal humerus fracture.  The greater tuberosity fracture fragments intermittently displaced however there is one posterior fragment.his larger and displaced.    IMPRESSION: 61 year old-year-old right hand dominant female, with left proximal humerus fracture.     PLAN:     I discussed with the patient the etiology of their condition. We discussed at length the options as noted above.  I went through the results of the CT with this patient today.  We discussed that overall there is appropriate alignment of the surgical neck, lesser tuberosity however the greater tuberosity does have a posterior fragment that is more displaced.  We discussed that this fragment would be the insertion sites of the infraspinatus and teres minor.  Talked about options including conservative versus surgical intervention.  I discussed with the patient that the displacement of this posterior fragment would meet criteria for surgical intervention, the potential downsides of nonoperative treatment would include dysfunction of the caries minor and infraspinatus due to altered length tension relationship, potential for impingement, nonunion, potential for decreased range of motion and pain.  Alternatively we discussed the option of open reduction internal fixation, we discussed the goal of this would be to primarily align the greater tuberosity fragment better which could give overall better shoulder functioning.  I went through the risks and benefits of surgery.  At this point given that it is the patient's nondominant arm inflammatory fractures overall fairly well-maintained the patient wishes to pursue nonoperative treatment.  We discussed continuing with sling immobilization, icing, use of over-the-counter analgesics with Tylenol as patient unable to take anti-inflammatories.  We also like to submit a physical therapy referral so that the  patient get sling education to feel uncomfortable.  Discussed that we will start formal physical therapy in a few weeks time.  I will follow-up the patient another 2 weeks with repeat x-rays.  At the conclusion of the office visit, Franchesca verbally acknowledged that I answered all of her questions satisfactorily.    Priti Ugarte MD  Orthopedic Surgery Sports Medicine and Shoulder Surgery      Again, thank you for allowing me to participate in the care of your patient.        Sincerely,        PRITI UGARTE MD

## 2022-11-17 ENCOUNTER — ANCILLARY PROCEDURE (OUTPATIENT)
Dept: GENERAL RADIOLOGY | Facility: CLINIC | Age: 62
End: 2022-11-17
Attending: ORTHOPAEDIC SURGERY
Payer: COMMERCIAL

## 2022-11-17 ENCOUNTER — OFFICE VISIT (OUTPATIENT)
Dept: ORTHOPEDICS | Facility: CLINIC | Age: 62
End: 2022-11-17
Payer: COMMERCIAL

## 2022-11-17 DIAGNOSIS — M25.512 ACUTE PAIN OF LEFT SHOULDER: ICD-10-CM

## 2022-11-17 DIAGNOSIS — S42.292A OTHER CLOSED DISPLACED FRACTURE OF PROXIMAL END OF LEFT HUMERUS, INITIAL ENCOUNTER: Primary | ICD-10-CM

## 2022-11-17 PROCEDURE — 99213 OFFICE O/P EST LOW 20 MIN: CPT | Performed by: ORTHOPAEDIC SURGERY

## 2022-11-17 PROCEDURE — 73030 X-RAY EXAM OF SHOULDER: CPT | Mod: LT | Performed by: RADIOLOGY

## 2022-11-17 NOTE — LETTER
11/17/2022         RE: Franchesca Hyatt  [address]  [place] [place] [address]        Dear Colleague,    Thank you for referring your patient, Franchesca Hyatt, to the [facility] [place]. Please see a copy of my visit note below.    CHIEF COMPLAINT:  Follow up Left proximal humerus fracture DOI: 10/31/22     HISTORY OF PRESENT ILLNESS: Franchesca is an extremely pleasant 61 year-old right-hand dominant female who presents for follow up pf left shoulder proximal humerus fracture.  Patient is doing a lot better since last visit, pain is now well controlled with Tylenol and CBD cream.  Patient has adjusted better to the sling.    EXAM:  Pleasant adult 61 year old in no distress.  Respirations even and unlabored.  Left upper extremity:  Sling in place, skin closed, neurovascularly intact including firing of the axillary nerve    DATA REVIEW:  Grashey, scapular Y, axillary, Zanca view x-rays of the left shoulder were ordered and reviewed today which show that there is a surgical neck and greater tuberosity proximal humerus fracture, there is been no significant interval displacement.     ASSESSMENT:  1.  Three-part left proximal humerus fracture date of injury 10/31/2022, nonoperative treatment    PLAN:  Discussed with the patient that the x-rays are stable.  Patient had elected for nonoperative treatment after our discussion based on the results of the CT scan.  This point we will continue with conservative treatment measures.  Patient is scheduled start some physical therapy tomorrow on the nonoperative proximal humerus protocol, I also reached out to the patient's physical therapist to discuss going slow with physical therapy until the 6-week rigo.  We discussed that sling wear will be 7 weeks total, continue to be nonweightbearing.  Patient will continue over-the-counter analgesics as needed for pain.  I will follow-up with the patient approximately 1 month's time with repeat  x-rays.    Priti Geronimo  Orthopedic Surgery Sports Medicine and Shoulder Surgery

## 2022-11-18 ENCOUNTER — THERAPY VISIT (OUTPATIENT)
Dept: PHYSICAL THERAPY | Facility: CLINIC | Age: 62
End: 2022-11-18
Attending: ORTHOPAEDIC SURGERY
Payer: COMMERCIAL

## 2022-11-18 DIAGNOSIS — S42.212A FX HUMERAL NECK, LEFT, CLOSED, INITIAL ENCOUNTER: ICD-10-CM

## 2022-11-18 DIAGNOSIS — S42.292A OTHER CLOSED DISPLACED FRACTURE OF PROXIMAL END OF LEFT HUMERUS, INITIAL ENCOUNTER: ICD-10-CM

## 2022-11-18 PROCEDURE — 97530 THERAPEUTIC ACTIVITIES: CPT | Mod: GP

## 2022-11-18 PROCEDURE — 97161 PT EVAL LOW COMPLEX 20 MIN: CPT | Mod: GP

## 2022-11-18 PROCEDURE — 97110 THERAPEUTIC EXERCISES: CPT | Mod: GP

## 2022-11-18 NOTE — PROGRESS NOTES
Physical Therapy Initial Evaluation  Therapist Impression: Franchesca is a 61 year old year old female referred to physical therapy by Dr. Geronimo for treatment of non operative management of a L proximal humerus fracture. Subjective history and objective findings are consistent with diagnosis. Due to these impairments, patient has difficulty with ADLs and use of L UE. Patient will benefit from skilled PT to address impairments/limitations in order to reach patient's goals, facilitate return to prior level of function, and maximize participation.    KEY FINDINGS:  1. Lacking full L elbow extension  2. Edema L hand  3. Slight adjustment needed for sling    MD instructions: follow non op proximal humerus fracture protocol    Subjective:  The history is provided by the patient. No  was used.   Therapist Generated HPI Evaluation  Problem details: Pt presents after a fall at home while unloading groceries which resulted in a L proximal humerus fracture. DOI 10/31/22. Also landed on her knee. Feels like she is doing a little better the past few days. Lives alone, has had some difficulty with the sling. Pain has been difficult to manage, but getting better. Has significant bruising L arm, down into forearm.     Pt here today primarily for help w/ sling donning/doffing and fit. Dr. Geronimo would like formal PT to start at the 6 week rigo.     Most recent L shoulder Xray: 11/7/22  Impression:  Stable appearance of the comminuted fracture of left humeral head and  greater tuberosity. New mild inferior subluxation of the humerus from  glenoid fossa, likely related to joint effusion. .         Type of problem:  Left shoulder.    This is a new condition.  Condition occurred with:  A fall.  Where condition occurred: at home.  Patient reports pain:  Lateral (all around shoulder).  Pain is described as aching (will twinge at times) and is intermittent.  Pain is worse during the night and worse in the A.M..  Since onset  symptoms are gradually improving.  Associated symptoms:  Edema (hand). Exacerbated by: any accidental movement, bathing, getting sling on, getting dressed.  Relieved by: Tylenol, CBD.  Special tests included:  X-ray.    Restrictions due to condition include:  Currently not working due to present treatment.  Barriers include:  Lives alone.    Patient Health History         Pain is reported as 4/10 on pain scale.  General health as reported by patient is good.  Pertinent medical history includes: high blood pressure, overweight and kidney disease.     Medical allergies: vicodin.       Current medications:  High blood pressure medication.    Current occupation is , healer.   Primary job tasks include:  Lifting/carrying and pushing/pulling.   Other job/home tasks details: single homeowner, very active.                                  Objective:  System                   Shoulder Evaluation:  ROM:  AROM:  : R shoulder AROM WNL. Min scapular retraction on L, WNL on R.                             PROM:    Flexion:  Left:  10 deg                      Elbow Flexion:  Left:  Did not push past 90 deg      Elbow Extension:  Left:  Lacking about 10 deg                                                               General Evaluation:                Edema:  Edema wnl general: L hand edema noted, along with bruising upper and lower arm.                                                     ROS    Assessment/Plan:    Patient is a 61 year old female with left side shoulder complaints.    Patient has the following significant findings with corresponding treatment plan.                Diagnosis 1:  L proximal humerus fracture  Pain -  hot/cold therapy, manual therapy, splint/taping/bracing/orthotics, self management, education and home program  Decreased ROM/flexibility - manual therapy, therapeutic exercise, therapeutic activity and home program  Decreased joint mobility - manual therapy, therapeutic exercise,  therapeutic activity and home program  Decreased strength - therapeutic exercise, therapeutic activities and home program  Edema - cold therapy and self management/home program  Impaired muscle performance - neuro re-education and home program  Decreased function - therapeutic activities and home program  Impaired posture - neuro re-education, therapeutic activities and home program    Therapy Evaluation Codes:   Cumulative Therapy Evaluation is: Low complexity.    Previous and current functional limitations:  (See Goal Flow Sheet for this information)    Short term and Long term goals: (See Goal Flow Sheet for this information)     Communication ability:  Patient appears to be able to clearly communicate and understand verbal and written communication and follow directions correctly.  Treatment Explanation - The following has been discussed with the patient:   RX ordered/plan of care  Anticipated outcomes  Possible risks and side effects  This patient would benefit from PT intervention to resume normal activities.   Rehab potential is excellent.    Frequency:  1 X week, once daily  Duration:  for 12 weeks  Discharge Plan:  Achieve all LTG.  Independent in home treatment program.  Reach maximal therapeutic benefit.    Please refer to the daily flowsheet for treatment today, total treatment time and time spent performing 1:1 timed codes.

## 2022-12-15 ENCOUNTER — ANCILLARY PROCEDURE (OUTPATIENT)
Dept: GENERAL RADIOLOGY | Facility: CLINIC | Age: 62
End: 2022-12-15
Attending: ORTHOPAEDIC SURGERY
Payer: COMMERCIAL

## 2022-12-15 ENCOUNTER — OFFICE VISIT (OUTPATIENT)
Dept: ORTHOPEDICS | Facility: CLINIC | Age: 62
End: 2022-12-15
Payer: COMMERCIAL

## 2022-12-15 DIAGNOSIS — M25.532 LEFT WRIST PAIN: ICD-10-CM

## 2022-12-15 DIAGNOSIS — S42.292A OTHER CLOSED DISPLACED FRACTURE OF PROXIMAL END OF LEFT HUMERUS, INITIAL ENCOUNTER: ICD-10-CM

## 2022-12-15 DIAGNOSIS — S42.292A OTHER CLOSED DISPLACED FRACTURE OF PROXIMAL END OF LEFT HUMERUS, INITIAL ENCOUNTER: Primary | ICD-10-CM

## 2022-12-15 DIAGNOSIS — M25.532 LEFT WRIST PAIN: Primary | ICD-10-CM

## 2022-12-15 PROCEDURE — 73030 X-RAY EXAM OF SHOULDER: CPT | Mod: LT | Performed by: RADIOLOGY

## 2022-12-15 PROCEDURE — 73110 X-RAY EXAM OF WRIST: CPT | Mod: LT | Performed by: RADIOLOGY

## 2022-12-15 PROCEDURE — 99213 OFFICE O/P EST LOW 20 MIN: CPT | Performed by: ORTHOPAEDIC SURGERY

## 2022-12-15 NOTE — PROGRESS NOTES
CHIEF COMPLAINT:  Follow up Left proximal humerus fracture DOI: 10/31/22     HISTORY OF PRESENT ILLNESS: Franchesca is an extremely pleasant 61 year-old right-hand dominant female who presents for follow up pf left shoulder proximal humerus fracture.  Patient is doing better since last visit.  She complains of left wrist pain.      EXAM:  Pleasant adult 61 year old in no distress.  Respirations even and unlabored.  Left upper extremity:  Sling in place, skin closed, neurovascularly intact including firing of the axillary nerve.  Tolerates pendulums and gentle passive range of motion about the shoulder    DATA REVIEW:  Grashey, scapular Y, axillary, Zanca view x-rays of the left shoulder were ordered and reviewed today which show that there is a surgical neck and greater tuberosity proximal humerus fracture, there is been no significant interval displacement, ongoing healing.  Additionally AP and lateral radiographs of the left wrist were ordered and reviewed show no acute fracture..    ASSESSMENT:  1.  Three-part left proximal humerus fracture date of injury 10/31/2022, nonoperative treatment    PLAN:  Discussed with the patient that the x-rays are stable.  Patient had elected for nonoperative treatment after our discussion based on the results of the CT scan.  At this point we discussed the patient can start to wean out of the sling.  Patient is scheduled to start physical therapy on Monday.  Patient is also going to do home exercises.  I demonstrated to the patient how to do pendulum exercises today, should continue to do passive range of motion about the elbow wrist and fingers.  I will follow-up with the patient in another 6 to 8 weeks with repeat x-rays.      Priti Geronimo  Orthopedic Surgery Sports Medicine and Shoulder Surgery

## 2022-12-15 NOTE — LETTER
12/15/2022         RE: Franchesca Hyatt  [address]  [place] [address]        Dear Colleague,    Thank you for referring your patient, Franchesca Hyatt, to the [facility] [place]. Please see a copy of my visit note below.    CHIEF COMPLAINT:  Follow up Left proximal humerus fracture DOI: 10/31/22     HISTORY OF PRESENT ILLNESS: Franchesca is an extremely pleasant 61 year-old right-hand dominant female who presents for follow up pf left shoulder proximal humerus fracture.  Patient is doing better since last visit.  She complains of left wrist pain.      EXAM:  Pleasant adult 61 year old in no distress.  Respirations even and unlabored.  Left upper extremity:  Sling in place, skin closed, neurovascularly intact including firing of the axillary nerve.  Tolerates pendulums and gentle passive range of motion about the shoulder    DATA REVIEW:  Grashey, scapular Y, axillary, Zanca view x-rays of the left shoulder were ordered and reviewed today which show that there is a surgical neck and greater tuberosity proximal humerus fracture, there is been no significant interval displacement, ongoing healing.  Additionally AP and lateral radiographs of the left wrist were ordered and reviewed show no acute fracture..    ASSESSMENT:  1.  Three-part left proximal humerus fracture date of injury 10/31/2022, nonoperative treatment    PLAN:  Discussed with the patient that the x-rays are stable.  Patient had elected for nonoperative treatment after our discussion based on the results of the CT scan.  At this point we discussed the patient can start to wean out of the sling.  Patient is scheduled to start physical therapy on Monday.  Patient is also going to do home exercises.  I demonstrated to the patient how to do pendulum exercises today, should continue to do passive range of motion about the elbow wrist and fingers.  I will follow-up with the patient in another 6 to 8 weeks with repeat  x-rays.      Priti Geronimo  Orthopedic Surgery Sports Medicine and Shoulder Surgery

## 2022-12-19 ENCOUNTER — THERAPY VISIT (OUTPATIENT)
Dept: PHYSICAL THERAPY | Facility: CLINIC | Age: 62
End: 2022-12-19
Payer: COMMERCIAL

## 2022-12-19 DIAGNOSIS — S42.292A OTHER CLOSED DISPLACED FRACTURE OF PROXIMAL END OF LEFT HUMERUS, INITIAL ENCOUNTER: ICD-10-CM

## 2022-12-19 DIAGNOSIS — S42.212A FX HUMERAL NECK, LEFT, CLOSED, INITIAL ENCOUNTER: Primary | ICD-10-CM

## 2022-12-19 PROCEDURE — 97530 THERAPEUTIC ACTIVITIES: CPT | Mod: GP

## 2022-12-19 PROCEDURE — 97110 THERAPEUTIC EXERCISES: CPT | Mod: GP

## 2022-12-20 DIAGNOSIS — I10 ESSENTIAL HYPERTENSION WITH GOAL BLOOD PRESSURE LESS THAN 140/90: ICD-10-CM

## 2022-12-20 DIAGNOSIS — N18.31 STAGE 3A CHRONIC KIDNEY DISEASE (H): ICD-10-CM

## 2022-12-20 RX ORDER — LISINOPRIL 10 MG/1
TABLET ORAL
Qty: 180 TABLET | Refills: 3 | OUTPATIENT
Start: 2022-12-20

## 2022-12-28 ENCOUNTER — THERAPY VISIT (OUTPATIENT)
Dept: PHYSICAL THERAPY | Facility: CLINIC | Age: 62
End: 2022-12-28
Payer: COMMERCIAL

## 2022-12-28 DIAGNOSIS — S42.212A FX HUMERAL NECK, LEFT, CLOSED, INITIAL ENCOUNTER: Primary | ICD-10-CM

## 2022-12-28 DIAGNOSIS — S42.292A OTHER CLOSED DISPLACED FRACTURE OF PROXIMAL END OF LEFT HUMERUS, INITIAL ENCOUNTER: ICD-10-CM

## 2022-12-28 PROCEDURE — 97110 THERAPEUTIC EXERCISES: CPT | Mod: GP

## 2023-01-03 ENCOUNTER — THERAPY VISIT (OUTPATIENT)
Dept: PHYSICAL THERAPY | Facility: CLINIC | Age: 63
End: 2023-01-03
Payer: COMMERCIAL

## 2023-01-03 DIAGNOSIS — S42.212A FX HUMERAL NECK, LEFT, CLOSED, INITIAL ENCOUNTER: Primary | ICD-10-CM

## 2023-01-03 DIAGNOSIS — S42.292A OTHER CLOSED DISPLACED FRACTURE OF PROXIMAL END OF LEFT HUMERUS, INITIAL ENCOUNTER: ICD-10-CM

## 2023-01-03 PROCEDURE — 97110 THERAPEUTIC EXERCISES: CPT | Mod: GP

## 2023-01-03 NOTE — PROGRESS NOTES
PROGRESS  REPORT    Progress reporting period is from initial eval to 1/3/22.       SUBJECTIVE  Subjective changes noted by patient: Continued wrist pain, though swelling is down a little.    Initial Pain level: 4/10.   Changes in function:  Yes, see objective.  Adverse reaction to treatment or activity: None    OBJECTIVE  Changes noted in objective findings:  Yes, improved A/PROM.  Objective: L wrist supination 62 deg, wrist extension 30 deg, flexion 50 deg in elbow flexion). PROM  deg, ABD 75 deg, ER 30 deg. AROM shoulder FE 72 deg, ABD 68 deg. Trialed pulleys for first time today, pt enjoyed. May consider ordering for home.     ASSESSMENT/PLAN  Updated problem list and treatment plan: Diagnosis 1:  L proximal humerus fracture, non-op  Pain -  manual therapy, splint/taping/bracing/orthotics, self management, education and home program  Decreased ROM/flexibility - manual therapy, therapeutic exercise, therapeutic activity and home program  Decreased joint mobility - manual therapy, therapeutic exercise, therapeutic activity and home program  Decreased strength - therapeutic exercise, therapeutic activities and home program  Inflammation - cold therapy and self management/home program  Impaired muscle performance - neuro re-education and home program  Decreased function - therapeutic activities and home program  STG/LTGs have been met or progress has been made towards goals:  Yes (See Goal flow sheet completed today.)  Assessment of Progress: The patient's condition is improving.  Self Management Plans:  Patient has been instructed in a home treatment program.  I have re-evaluated this patient and find that the nature, scope, duration and intensity of the therapy is appropriate for the medical condition of the patient.  Franchesca continues to require the following intervention to meet STG and LTG's:  PT    Recommendations:  This patient would benefit from continued therapy.     Frequency:  1 X week, once  daily  Duration:  for 8 weeks        Please refer to the daily flowsheet for treatment today, total treatment time and time spent performing 1:1 timed codes.

## 2023-01-12 ENCOUNTER — THERAPY VISIT (OUTPATIENT)
Dept: PHYSICAL THERAPY | Facility: CLINIC | Age: 63
End: 2023-01-12
Payer: COMMERCIAL

## 2023-01-12 DIAGNOSIS — S42.212A FX HUMERAL NECK, LEFT, CLOSED, INITIAL ENCOUNTER: Primary | ICD-10-CM

## 2023-01-12 DIAGNOSIS — S42.292A OTHER CLOSED DISPLACED FRACTURE OF PROXIMAL END OF LEFT HUMERUS, INITIAL ENCOUNTER: ICD-10-CM

## 2023-01-12 PROCEDURE — 97140 MANUAL THERAPY 1/> REGIONS: CPT | Mod: GP | Performed by: PHYSICAL THERAPIST

## 2023-01-12 PROCEDURE — 97110 THERAPEUTIC EXERCISES: CPT | Mod: GP | Performed by: PHYSICAL THERAPIST

## 2023-01-18 ENCOUNTER — THERAPY VISIT (OUTPATIENT)
Dept: PHYSICAL THERAPY | Facility: CLINIC | Age: 63
End: 2023-01-18
Payer: COMMERCIAL

## 2023-01-18 DIAGNOSIS — S42.212A FX HUMERAL NECK, LEFT, CLOSED, INITIAL ENCOUNTER: Primary | ICD-10-CM

## 2023-01-18 DIAGNOSIS — S42.292A OTHER CLOSED DISPLACED FRACTURE OF PROXIMAL END OF LEFT HUMERUS, INITIAL ENCOUNTER: ICD-10-CM

## 2023-01-18 PROCEDURE — 97110 THERAPEUTIC EXERCISES: CPT | Mod: GP

## 2023-01-18 PROCEDURE — 97140 MANUAL THERAPY 1/> REGIONS: CPT | Mod: GP

## 2023-01-27 ENCOUNTER — THERAPY VISIT (OUTPATIENT)
Dept: PHYSICAL THERAPY | Facility: CLINIC | Age: 63
End: 2023-01-27
Payer: COMMERCIAL

## 2023-01-27 DIAGNOSIS — S42.292A OTHER CLOSED DISPLACED FRACTURE OF PROXIMAL END OF LEFT HUMERUS, INITIAL ENCOUNTER: ICD-10-CM

## 2023-01-27 DIAGNOSIS — S42.212A FX HUMERAL NECK, LEFT, CLOSED, INITIAL ENCOUNTER: Primary | ICD-10-CM

## 2023-01-27 PROCEDURE — 97110 THERAPEUTIC EXERCISES: CPT | Mod: GP

## 2023-02-03 DIAGNOSIS — S42.292A OTHER CLOSED DISPLACED FRACTURE OF PROXIMAL END OF LEFT HUMERUS, INITIAL ENCOUNTER: Primary | ICD-10-CM

## 2023-02-10 NOTE — PROGRESS NOTES
CHIEF COMPLAINT:  Follow up Left proximal humerus fracture DOI: 10/31/22     HISTORY OF PRESENT ILLNESS: Franchesca is an extremely pleasant 61 year-old right-hand dominant female who presents for follow up of left shoulder proximal humerus fracture.   She states that she is doing much better since last visit.  Not sleeping in the recliner anymore and able to drive and wear a bra.  She is still in PT and it is going well.  She states her left wrist is still bothering her.        EXAM:  Pleasant adult 61 year old in no distress.  Respirations even and unlabored.  Left upper extremity:  Active , Abduction 100, ER 40, IR hip  Distally neurovascularly intact    DATA REVIEW:  Grashey, scapular Y, axillary, Zanca view x-rays of the left shoulder were ordered and reviewed today which show that there is a surgical neck and greater tuberosity proximal humerus fracture, there is been no significant interval displacement, ongoing healing.      ASSESSMENT:  1.  Three-part left proximal humerus fracture date of injury 10/31/2022, nonoperative treatment    PLAN:  Discussed with the patient that the x-rays are stable.  Patient progressing well with physical therapy.  We discussed how to continue with physical therapy and return to as tolerated.  At this point patient can follow-up as needed.      Priti Geronimo  Orthopedic Surgery Sports Medicine and Shoulder Surgery

## 2023-02-16 ENCOUNTER — OFFICE VISIT (OUTPATIENT)
Dept: ORTHOPEDICS | Facility: CLINIC | Age: 63
End: 2023-02-16
Payer: COMMERCIAL

## 2023-02-16 ENCOUNTER — ANCILLARY PROCEDURE (OUTPATIENT)
Dept: GENERAL RADIOLOGY | Facility: CLINIC | Age: 63
End: 2023-02-16
Attending: ORTHOPAEDIC SURGERY
Payer: COMMERCIAL

## 2023-02-16 DIAGNOSIS — S42.292A OTHER CLOSED DISPLACED FRACTURE OF PROXIMAL END OF LEFT HUMERUS, INITIAL ENCOUNTER: ICD-10-CM

## 2023-02-16 DIAGNOSIS — S42.292A OTHER CLOSED DISPLACED FRACTURE OF PROXIMAL END OF LEFT HUMERUS, INITIAL ENCOUNTER: Primary | ICD-10-CM

## 2023-02-16 PROCEDURE — 73030 X-RAY EXAM OF SHOULDER: CPT | Mod: LT | Performed by: RADIOLOGY

## 2023-02-16 PROCEDURE — 99213 OFFICE O/P EST LOW 20 MIN: CPT | Performed by: ORTHOPAEDIC SURGERY

## 2023-02-16 NOTE — LETTER
2/16/2023         RE: Franchesca Hyatt  [address]  [place] [address]        Dear Colleague,    Thank you for referring your patient, Franchesca Hyatt, to the [facility] [place]. Please see a copy of my visit note below.    CHIEF COMPLAINT:  Follow up Left proximal humerus fracture DOI: 10/31/22     HISTORY OF PRESENT ILLNESS: Franchesca is an extremely pleasant 61 year-old right-hand dominant female who presents for follow up of left shoulder proximal humerus fracture.   She states that she is doing much better since last visit.  Not sleeping in the recliner anymore and able to drive and wear a bra.  She is still in PT and it is going well.  She states her left wrist is still bothering her.        EXAM:  Pleasant adult 61 year old in no distress.  Respirations even and unlabored.  Left upper extremity:  Active , Abduction 100, ER 40, IR hip  Distally neurovascularly intact    DATA REVIEW:  Grashey, scapular Y, axillary, Zanca view x-rays of the left shoulder were ordered and reviewed today which show that there is a surgical neck and greater tuberosity proximal humerus fracture, there is been no significant interval displacement, ongoing healing.      ASSESSMENT:  1.  Three-part left proximal humerus fracture date of injury 10/31/2022, nonoperative treatment    PLAN:  Discussed with the patient that the x-rays are stable.  Patient progressing well with physical therapy.  We discussed how to continue with physical therapy and return to as tolerated.  At this point patient can follow-up as needed.      Priti Geronimo  Orthopedic Surgery Sports Medicine and Shoulder Surgery

## 2023-03-08 ENCOUNTER — THERAPY VISIT (OUTPATIENT)
Dept: PHYSICAL THERAPY | Facility: CLINIC | Age: 63
End: 2023-03-08
Payer: COMMERCIAL

## 2023-03-08 DIAGNOSIS — S42.212A FX HUMERAL NECK, LEFT, CLOSED, INITIAL ENCOUNTER: Primary | ICD-10-CM

## 2023-03-08 DIAGNOSIS — S42.292A OTHER CLOSED DISPLACED FRACTURE OF PROXIMAL END OF LEFT HUMERUS, INITIAL ENCOUNTER: ICD-10-CM

## 2023-03-08 PROCEDURE — 97110 THERAPEUTIC EXERCISES: CPT | Mod: GP

## 2023-03-08 NOTE — PROGRESS NOTES
PROGRESS  REPORT    Progress reporting period is from 1/3/23 to 3/8/23.       SUBJECTIVE  Subjective changes noted by patient: Things are going well. Would like to get the exercises she needs for strengthening her shoulder. She might not want to come back for financial and work reasons. May end up checking in 6 weeks or so.      Initial Pain level: 4/10.   Changes in function:  Yes (See Goal flowsheet attached for changes in current functional level)  Adverse reaction to treatment or activity: activity - work - increased soreness/stiffness today.    OBJECTIVE  Changes noted in objective findings:  Yes, see below. Patient was steadily improving ROM/function, more sore and tight today after getting back to work.  Objective: AROM  deg, ABD 85 deg, ER 30 deg. AROM FE in supine 95 deg, ABD in sidelying 90 deg. Session spent instructing on how to progress, what she should focus on for IND mngt for next several weeks.     ASSESSMENT/PLAN  Updated problem list and treatment plan: Diagnosis 1:  L humerus fracture  Pain -  manual therapy, splint/taping/bracing/orthotics, self management, education and home program  Decreased ROM/flexibility - manual therapy, therapeutic exercise, therapeutic activity and home program  Decreased joint mobility - manual therapy, therapeutic exercise, therapeutic activity and home program  Decreased strength - therapeutic exercise, therapeutic activities and home program  Decreased function - therapeutic activities and home program  STG/LTGs have been met or progress has been made towards goals:  Yes (See Goal flow sheet completed today.)  Assessment of Progress: The patient's condition is improving.  Self Management Plans:  Patient has been instructed in a home treatment program.  I have re-evaluated this patient and find that the nature, scope, duration and intensity of the therapy is appropriate for the medical condition of the patient.  Franchesca continues to require the following  intervention to meet STG and LTG's:  PT    Recommendations:  This patient would benefit from continued therapy.     Frequency:  2 X a month, once daily  Duration:  for 3 months        Please refer to the daily flowsheet for treatment today, total treatment time and time spent performing 1:1 timed codes.

## 2023-05-11 ENCOUNTER — THERAPY VISIT (OUTPATIENT)
Dept: PHYSICAL THERAPY | Facility: CLINIC | Age: 63
End: 2023-05-11
Payer: COMMERCIAL

## 2023-05-11 DIAGNOSIS — S42.292A OTHER CLOSED DISPLACED FRACTURE OF PROXIMAL END OF LEFT HUMERUS, INITIAL ENCOUNTER: ICD-10-CM

## 2023-05-11 DIAGNOSIS — S42.212A FX HUMERAL NECK, LEFT, CLOSED, INITIAL ENCOUNTER: Primary | ICD-10-CM

## 2023-05-11 PROCEDURE — 97140 MANUAL THERAPY 1/> REGIONS: CPT | Mod: GP

## 2023-05-11 PROCEDURE — 97110 THERAPEUTIC EXERCISES: CPT | Mod: GP

## 2023-05-11 NOTE — PROGRESS NOTES
PROGRESS  REPORT    Progress reporting period is from 3/8/23 to 5/11/23.       SUBJECTIVE  Subjective changes noted by patient: Still doesn't have full ROM. Is doing yoga, acupuncture, energy work, massage. Still working on PT exercises. Most difficulty with overhead movements. Pain is less overall.    Initial Pain level: 4/10.   Changes in function:  Yes (See Goal flowsheet attached for changes in current functional level)  Adverse reaction to treatment or activity: None    OBJECTIVE  Changes noted in objective findings:  Yes, see below.  Objective: AROM  deg,  deg, ER 47 deg, IR/ext L hip. PROM  deg. Wrist ROM WFL.     ASSESSMENT/PLAN  Updated problem list and treatment plan: Diagnosis 1:  L proximal humerus fracture  Pain -  manual therapy, splint/taping/bracing/orthotics, self management, education and home program  Decreased ROM/flexibility - manual therapy, therapeutic exercise, therapeutic activity and home program  Decreased joint mobility - manual therapy, therapeutic exercise, therapeutic activity and home program  Decreased strength - therapeutic exercise, therapeutic activities and home program  Decreased function - therapeutic activities and home program  STG/LTGs have been met or progress has been made towards goals:  Yes (See Goal flow sheet completed today.)  Assessment of Progress: The patient's condition is improving.  Self Management Plans:  Patient has been instructed in a home treatment program.  I have re-evaluated this patient and find that the nature, scope, duration and intensity of the therapy is appropriate for the medical condition of the patient.  Franchesca continues to require the following intervention to meet STG and LTG's:  PT    Recommendations:  This patient would benefit from continued therapy.     Frequency:  2 X a month, once daily  Duration:  for 2 months        Please refer to the daily flowsheet for treatment today, total treatment time and time spent  performing 1:1 timed codes.

## 2023-08-23 ENCOUNTER — HOSPITAL ENCOUNTER (OUTPATIENT)
Facility: CLINIC | Age: 63
Setting detail: OBSERVATION
Discharge: HOME | End: 2023-08-24
Attending: FAMILY MEDICINE
Payer: COMMERCIAL

## 2023-08-23 ENCOUNTER — APPOINTMENT (OUTPATIENT)
Dept: GENERAL RADIOLOGY | Facility: CLINIC | Age: 63
End: 2023-08-23
Attending: FAMILY MEDICINE
Payer: COMMERCIAL

## 2023-08-23 DIAGNOSIS — R07.9 CHEST PAIN, UNSPECIFIED TYPE: ICD-10-CM

## 2023-08-23 DIAGNOSIS — I12.9 BENIGN HYPERTENSIVE KIDNEY DISEASE WITH CHRONIC KIDNEY DISEASE STAGE I THROUGH STAGE IV, OR UNSPECIFIED(403.10): Primary | ICD-10-CM

## 2023-08-23 DIAGNOSIS — N18.9 CHRONIC KIDNEY DISEASE, UNSPECIFIED CKD STAGE: ICD-10-CM

## 2023-08-23 DIAGNOSIS — Z87.891 HISTORY OF TOBACCO USE: ICD-10-CM

## 2023-08-23 LAB
ALBUMIN SERPL BCG-MCNC: 4.6 G/DL (ref 3.5–5.2)
ALP SERPL-CCNC: 61 U/L (ref 35–104)
ALT SERPL W P-5'-P-CCNC: 23 U/L (ref 0–50)
ANION GAP SERPL CALCULATED.3IONS-SCNC: 14 MMOL/L (ref 7–15)
AST SERPL W P-5'-P-CCNC: 20 U/L (ref 0–45)
ATRIAL RATE - MUSE: 94 BPM
BASOPHILS # BLD AUTO: 0 10E3/UL (ref 0–0.2)
BASOPHILS NFR BLD AUTO: 0 %
BILIRUB SERPL-MCNC: 1.6 MG/DL
BUN SERPL-MCNC: 11.5 MG/DL (ref 8–23)
CALCIUM SERPL-MCNC: 10 MG/DL (ref 8.8–10.2)
CHLORIDE SERPL-SCNC: 103 MMOL/L (ref 98–107)
CREAT SERPL-MCNC: 1 MG/DL (ref 0.51–0.95)
D DIMER PPP FEU-MCNC: <0.27 UG/ML FEU (ref 0–0.5)
DEPRECATED HCO3 PLAS-SCNC: 24 MMOL/L (ref 22–29)
DIASTOLIC BLOOD PRESSURE - MUSE: NORMAL MMHG
EOSINOPHIL # BLD AUTO: 0 10E3/UL (ref 0–0.7)
EOSINOPHIL NFR BLD AUTO: 1 %
ERYTHROCYTE [DISTWIDTH] IN BLOOD BY AUTOMATED COUNT: 12.1 % (ref 10–15)
GFR SERPL CREATININE-BSD FRML MDRD: 64 ML/MIN/1.73M2
GLUCOSE SERPL-MCNC: 161 MG/DL (ref 70–99)
HCT VFR BLD AUTO: 45.7 % (ref 35–47)
HGB BLD-MCNC: 15.8 G/DL (ref 11.7–15.7)
IMM GRANULOCYTES # BLD: 0 10E3/UL
IMM GRANULOCYTES NFR BLD: 1 %
INTERPRETATION ECG - MUSE: NORMAL
LYMPHOCYTES # BLD AUTO: 1.1 10E3/UL (ref 0.8–5.3)
LYMPHOCYTES NFR BLD AUTO: 22 %
MCH RBC QN AUTO: 32 PG (ref 26.5–33)
MCHC RBC AUTO-ENTMCNC: 34.6 G/DL (ref 31.5–36.5)
MCV RBC AUTO: 93 FL (ref 78–100)
MONOCYTES # BLD AUTO: 0.3 10E3/UL (ref 0–1.3)
MONOCYTES NFR BLD AUTO: 5 %
NEUTROPHILS # BLD AUTO: 3.6 10E3/UL (ref 1.6–8.3)
NEUTROPHILS NFR BLD AUTO: 71 %
NRBC # BLD AUTO: 0 10E3/UL
NRBC BLD AUTO-RTO: 0 /100
NT-PROBNP SERPL-MCNC: 85 PG/ML (ref 0–900)
P AXIS - MUSE: 69 DEGREES
PLATELET # BLD AUTO: 218 10E3/UL (ref 150–450)
POTASSIUM SERPL-SCNC: 3.8 MMOL/L (ref 3.4–5.3)
PR INTERVAL - MUSE: 154 MS
PROT SERPL-MCNC: 6.9 G/DL (ref 6.4–8.3)
QRS DURATION - MUSE: 88 MS
QT - MUSE: 360 MS
QTC - MUSE: 450 MS
R AXIS - MUSE: -1 DEGREES
RBC # BLD AUTO: 4.94 10E6/UL (ref 3.8–5.2)
SODIUM SERPL-SCNC: 141 MMOL/L (ref 136–145)
SYSTOLIC BLOOD PRESSURE - MUSE: NORMAL MMHG
T AXIS - MUSE: 46 DEGREES
TROPONIN T BLD-MCNC: 0 UG/L
TROPONIN T SERPL HS-MCNC: 7 NG/L
TROPONIN T SERPL HS-MCNC: 8 NG/L
TROPONIN T SERPL HS-MCNC: 9 NG/L
TROPONIN T SERPL HS-MCNC: <6 NG/L
TSH SERPL DL<=0.005 MIU/L-ACNC: 1.45 UIU/ML (ref 0.3–4.2)
VENTRICULAR RATE- MUSE: 94 BPM
WBC # BLD AUTO: 5.1 10E3/UL (ref 4–11)

## 2023-08-23 PROCEDURE — G0378 HOSPITAL OBSERVATION PER HR: HCPCS

## 2023-08-23 PROCEDURE — 99285 EMERGENCY DEPT VISIT HI MDM: CPT | Mod: 25 | Performed by: FAMILY MEDICINE

## 2023-08-23 PROCEDURE — 84443 ASSAY THYROID STIM HORMONE: CPT | Performed by: FAMILY MEDICINE

## 2023-08-23 PROCEDURE — 36415 COLL VENOUS BLD VENIPUNCTURE: CPT | Performed by: PHYSICIAN ASSISTANT

## 2023-08-23 PROCEDURE — 84484 ASSAY OF TROPONIN QUANT: CPT | Mod: 91 | Performed by: PHYSICIAN ASSISTANT

## 2023-08-23 PROCEDURE — 85025 COMPLETE CBC W/AUTO DIFF WBC: CPT | Performed by: FAMILY MEDICINE

## 2023-08-23 PROCEDURE — 93005 ELECTROCARDIOGRAM TRACING: CPT | Performed by: FAMILY MEDICINE

## 2023-08-23 PROCEDURE — 84484 ASSAY OF TROPONIN QUANT: CPT

## 2023-08-23 PROCEDURE — 99223 1ST HOSP IP/OBS HIGH 75: CPT

## 2023-08-23 PROCEDURE — 84484 ASSAY OF TROPONIN QUANT: CPT | Performed by: FAMILY MEDICINE

## 2023-08-23 PROCEDURE — 250N000013 HC RX MED GY IP 250 OP 250 PS 637

## 2023-08-23 PROCEDURE — 93010 ELECTROCARDIOGRAM REPORT: CPT | Performed by: FAMILY MEDICINE

## 2023-08-23 PROCEDURE — 83880 ASSAY OF NATRIURETIC PEPTIDE: CPT | Performed by: FAMILY MEDICINE

## 2023-08-23 PROCEDURE — 80053 COMPREHEN METABOLIC PANEL: CPT | Performed by: FAMILY MEDICINE

## 2023-08-23 PROCEDURE — 36415 COLL VENOUS BLD VENIPUNCTURE: CPT

## 2023-08-23 PROCEDURE — 85379 FIBRIN DEGRADATION QUANT: CPT | Performed by: FAMILY MEDICINE

## 2023-08-23 PROCEDURE — 36415 COLL VENOUS BLD VENIPUNCTURE: CPT | Performed by: FAMILY MEDICINE

## 2023-08-23 PROCEDURE — 71046 X-RAY EXAM CHEST 2 VIEWS: CPT

## 2023-08-23 RX ORDER — LISINOPRIL 10 MG/1
10 TABLET ORAL 2 TIMES DAILY
Status: DISCONTINUED | OUTPATIENT
Start: 2023-08-23 | End: 2023-08-24 | Stop reason: HOSPADM

## 2023-08-23 RX ORDER — MAGNESIUM HYDROXIDE/ALUMINUM HYDROXICE/SIMETHICONE 120; 1200; 1200 MG/30ML; MG/30ML; MG/30ML
30 SUSPENSION ORAL EVERY 4 HOURS PRN
Status: DISCONTINUED | OUTPATIENT
Start: 2023-08-23 | End: 2023-08-24 | Stop reason: HOSPADM

## 2023-08-23 RX ORDER — ACETAMINOPHEN 650 MG/1
650 SUPPOSITORY RECTAL EVERY 6 HOURS PRN
Status: DISCONTINUED | OUTPATIENT
Start: 2023-08-23 | End: 2023-08-24

## 2023-08-23 RX ORDER — ASPIRIN 81 MG/1
81 TABLET ORAL DAILY
Status: DISCONTINUED | OUTPATIENT
Start: 2023-08-24 | End: 2023-08-24 | Stop reason: HOSPADM

## 2023-08-23 RX ORDER — NITROGLYCERIN 0.4 MG/1
0.4 TABLET SUBLINGUAL EVERY 5 MIN PRN
Status: DISCONTINUED | OUTPATIENT
Start: 2023-08-23 | End: 2023-08-24 | Stop reason: HOSPADM

## 2023-08-23 RX ORDER — ACETAMINOPHEN 325 MG/1
650 TABLET ORAL EVERY 6 HOURS PRN
Status: DISCONTINUED | OUTPATIENT
Start: 2023-08-23 | End: 2023-08-24

## 2023-08-23 RX ORDER — LIDOCAINE 40 MG/G
CREAM TOPICAL
Status: DISCONTINUED | OUTPATIENT
Start: 2023-08-23 | End: 2023-08-24 | Stop reason: HOSPADM

## 2023-08-23 RX ORDER — LISINOPRIL 10 MG/1
10 TABLET ORAL 2 TIMES DAILY
Status: DISCONTINUED | OUTPATIENT
Start: 2023-08-23 | End: 2023-08-23

## 2023-08-23 RX ORDER — ONDANSETRON 4 MG/1
4 TABLET, ORALLY DISINTEGRATING ORAL EVERY 6 HOURS PRN
Status: DISCONTINUED | OUTPATIENT
Start: 2023-08-23 | End: 2023-08-24 | Stop reason: HOSPADM

## 2023-08-23 RX ADMIN — LISINOPRIL 10 MG: 10 TABLET ORAL at 20:07

## 2023-08-23 RX ADMIN — ACETAMINOPHEN 650 MG: 325 TABLET, FILM COATED ORAL at 22:16

## 2023-08-23 ASSESSMENT — ACTIVITIES OF DAILY LIVING (ADL)
ADLS_ACUITY_SCORE: 31
ADLS_ACUITY_SCORE: 35

## 2023-08-23 NOTE — ED PROVIDER NOTES
[facility] EMERGENCY DEPARTMENT ([facility])    8/23/23      ED PROVIDER NOTE    History     Chief Complaint   Patient presents with    Chest Pain     Chest pain      HPI  Franchesca Hyatt is a 62 year old female with past medical history of Stage III CKD, hypertension, hyperlipidemia, and recent displaced fracture of proximal end of left humerus who presents to the emergency department for evaluation of chest pain. Patient states that she has never had this chest pain before. The chest pain developed last night. She states that she began to feel strange even though she wasn't out in the heat much. She has a blood pressure reading machine at home and noted systolic number at 184. She broke her left shoulder 9 months ago. Per chart review, her last physical therapy was in May. Patient is not diabetic. No smoking.     Past Medical History  Past Medical History:   Diagnosis Date    Calculus of kidney     Calculus, kidney 2007    CKD (chronic kidney disease) stage 3, GFR 30-59 ml/min (H)     Hypercholesteremia 11/29/2018    Hypertension goal BP (blood pressure) < 140/90 11/5/2012    Seasonal allergic rhinitis     seasonal allergies, cats    Tubular adenoma of colon 10/7/2014    Colonoscopy 10/1/2014, repeat 2019    Urinary tract infection, site not specified 1980 to early '90's    Past history of      Past Surgical History:   Procedure Laterality Date    BIOPSY BREAST      X 2    COLONOSCOPY  6/15/2009    Polyp - Due in 5 years    CYSTOSCOPY, RETROGRADES, MANIPULATE STONE, INSERT STENT, COMBINED  2007    EXCISE PILONIDAL CYST, EXTENSIVE      LASER REVOLIX LITHOTRIPSY URETER(S), INSERT STENT, COMBINED  2007    Sebaceous Cyst Removal  2008    Back     ACETAMINOPHEN PO  ACIDOPHILUS OR CAPS  Ascorbic Acid (VITAMIN C PO)  ciprofloxacin (CIPRO) 500 MG tablet  Cyanocobalamin (VITAMIN B 12 PO)  DIGEST ENZYMES-ANTICHOLINERGIC OR CAPS  Flaxseed, Linseed, (FLAX SEED OIL PO)  hydrOXYzine (ATARAX) 25 MG tablet  lidocaine  (LIDODERM) 5 % patch  lisinopril (ZESTRIL) 10 MG tablet  MAGNESIUM 300 MG OR CAPS  Multiple Vitamins-Minerals (ZINC PO)  Nettle, Urtica Dioica, (NETTLE LEAF PO)  ondansetron (ZOFRAN ODT) 4 MG ODT tab  order for DME  VITAMIN D OR      Allergies   Allergen Reactions    Seasonal Allergies     Vicodin [Hydrocodone-Acetaminophen]      Family History  Family History   Problem Relation Age of Onset    Hypertension Mother     Cerebrovascular Disease Mother         at age 50    Allergies Mother     Arthritis Mother         osteo    Depression Mother         Treated holistically    Cancer - colorectal Mother         BENIGN polyps 67 ()    Hypertension Father     Allergies Father     Depression Father         Took medication    Cerebrovascular Disease Father     Heart Disease Father         had heart surgery and a stroke     Cancer - colorectal Maternal Grandmother         at age 50    Alzheimer Disease Maternal Grandmother         diag at age 80    Eye Disorder Maternal Grandmother         cateracts    Osteoporosis Maternal Grandmother     Cerebrovascular Disease Maternal Grandmother     Colon Cancer Maternal Grandmother     Cancer Maternal Grandfather         smoker    Cancer Paternal Grandfather         smoker    Hypertension Brother     Depression Sister         Holistically    Gynecology Sister         cervical cancer at age 30    Cancer Sister         cervical     Social History   Social History     Tobacco Use    Smoking status: Former     Packs/day: 0.00     Years: 5.00     Pack years: 0.00     Types: Cigarettes     Start date: 1986     Quit date: 3/12/1991     Years since quittin.4    Smokeless tobacco: Never    Tobacco comments:     Was a light smoker for a few years in my youth.   Vaping Use    Vaping Use: Never used   Substance Use Topics    Alcohol use: Yes     Comment: rare - one glass per week of wine    Drug use: No      Past medical history, past surgical history, medications, allergies, family  "history, and social history were reviewed with the patient. No additional pertinent items.      A medically appropriate review of systems was performed with pertinent positives and negatives noted in the HPI, and all other systems negative.    Physical Exam     BP: (!) 151/98  Pulse: 114  Temp: 98.4  F (36.9  C)  Resp: 18  Height: 162.6 cm (5' 4\")  Weight: 86.6 kg (191 lb)  SpO2: 100 %    Physical Exam  Constitutional:       General: She is not in acute distress.     Appearance: Normal appearance. She is not diaphoretic.   HENT:      Head: Atraumatic.      Mouth/Throat:      Mouth: Mucous membranes are moist.   Eyes:      General: No scleral icterus.     Conjunctiva/sclera: Conjunctivae normal.   Cardiovascular:      Rate and Rhythm: Normal rate.      Heart sounds: Normal heart sounds.   Pulmonary:      Effort: No respiratory distress.      Breath sounds: Normal breath sounds.   Abdominal:      General: Abdomen is flat.   Musculoskeletal:      Cervical back: Neck supple.   Skin:     General: Skin is warm.      Findings: No rash.   Neurological:      General: No focal deficit present.      Mental Status: She is alert and oriented to person, place, and time.      Sensory: No sensory deficit.      Motor: No weakness.      Coordination: Coordination normal.   Psychiatric:         Mood and Affect: Mood is anxious. Affect is tearful.           ED Course, Procedures, & Data      Procedures    Medications   aspirin EC tablet 81 mg (has no administration in time range)   nitroGLYcerin (NITROSTAT) sublingual tablet 0.4 mg (has no administration in time range)   alum & mag hydroxide-simethicone (MAALOX) suspension 30 mL (has no administration in time range)   lidocaine 1 % 0.1-1 mL (has no administration in time range)   lidocaine (LMX4) cream (has no administration in time range)   sodium chloride (PF) 0.9% PF flush 3 mL (3 mLs Intracatheter $Given 8/23/23 5414)   sodium chloride (PF) 0.9% PF flush 3 mL (has no administration " in time range)   acetaminophen (TYLENOL) tablet 650 mg (has no administration in time range)     Or   acetaminophen (TYLENOL) Suppository 650 mg (has no administration in time range)   ondansetron (ZOFRAN ODT) ODT tab 4 mg (has no administration in time range)   HOLD: Beta Blockers The evening before and the morning of procedure. (has no administration in time range)   lisinopril (ZESTRIL) tablet 10 mg (10 mg Oral Not Given 8/23/23 1416)     Labs Ordered and Resulted from Time of ED Arrival to Time of ED Departure   COMPREHENSIVE METABOLIC PANEL - Abnormal       Result Value    Sodium 141      Potassium 3.8      Chloride 103      Carbon Dioxide (CO2) 24      Anion Gap 14      Urea Nitrogen 11.5      Creatinine 1.00 (*)     Calcium 10.0      Glucose 161 (*)     Alkaline Phosphatase 61      AST 20      ALT 23      Protein Total 6.9      Albumin 4.6      Bilirubin Total 1.6 (*)     GFR Estimate 64     CBC WITH PLATELETS AND DIFFERENTIAL - Abnormal    WBC Count 5.1      RBC Count 4.94      Hemoglobin 15.8 (*)     Hematocrit 45.7      MCV 93      MCH 32.0      MCHC 34.6      RDW 12.1      Platelet Count 218      % Neutrophils 71      % Lymphocytes 22      % Monocytes 5      % Eosinophils 1      % Basophils 0      % Immature Granulocytes 1      NRBCs per 100 WBC 0      Absolute Neutrophils 3.6      Absolute Lymphocytes 1.1      Absolute Monocytes 0.3      Absolute Eosinophils 0.0      Absolute Basophils 0.0      Absolute Immature Granulocytes 0.0      Absolute NRBCs 0.0     D DIMER QUANTITATIVE - Normal    D-Dimer Quantitative <0.27     TROPONIN T, HIGH SENSITIVITY - Normal    Troponin T, High Sensitivity 7     TSH WITH FREE T4 REFLEX - Normal    TSH 1.45     NT PROBNP INPATIENT - Normal    N terminal Pro BNP Inpatient 85     ISTAT TROPONIN POCT - Normal    TROPPC POCT 0.00     TROPONIN T, HIGH SENSITIVITY - Normal    Troponin T, High Sensitivity <6       XR Chest 2 Views   Final Result   IMPRESSION: No focal  consolidation, pleural effusion or pneumothorax.   Cardiomediastinal silhouette is unremarkable. Since 2012, similar tiny   radiodensity projecting over the left lower hemithorax, probably   within the left breast.      CARLOS WEBSTER MD            SYSTEM ID:  [id number]      Dobutamine Stress Echocardiogram    (Results Pending)          Critical care was not performed.     Medical Decision Making  The patient's presentation was of moderate complexity (an acute illness with systemic symptoms).    The patient's evaluation involved:  ordering and/or review of 3+ test(s) in this encounter (see separate area of note for details)  discussion of management or test interpretation with another health professional (discussed with cardiology)    The patient's management necessitated high risk (a decision regarding hospitalization).    Assessment & Plan          I have reviewed the nursing notes. I have reviewed the findings, diagnosis, plan and need for follow up with the patient.    Chest pain at this time will be admitted to the chest pain observation unit on [facility].    Final diagnoses:   Chest pain, unspecified type     Yoni Vigil  [facility] EMERGENCY DEPARTMENT  8/23/2023     Yoni Vigil MD  08/24/23 [id number]

## 2023-08-23 NOTE — ED TRIAGE NOTES
Triage Assessment       Row Name 08/23/23 1050       Triage Assessment (Adult)    Airway WDL WDL       Respiratory WDL    Respiratory WDL WDL       Skin Circulation/Temperature WDL    Skin Circulation/Temperature WDL WDL       Cardiac WDL    Cardiac WDL X;chest pain       Chest Pain Assessment    Chest Pain Location midsternal;shoulder, left  pt reports a boken L shoulder

## 2023-08-23 NOTE — H&P
[facility]    History and Physical - ED Observation       Date of Admission:  8/23/2023    Assessment & Plan      Franchesca Hyatt is a 62 year old female admitted on 8/23/2023. She has a PMH significant for CKD, HTN, HLD, and recent displaced fracture of proximal end of the left humerus who presents to the ED for evaluation of chest pain    ##Chest Pain  ##hx HTN, HLD and recent displaced left humerus fracture  Patient presents to the ED with complaint of chest pain that started this morning. She reports she has felt off all summer but thought it was her allergies and the poor air quality. She reports that this morning she began to feel a chest pressure that she hadn't felt before and it radiated down her left arm. She has been doing a lot of left shoulder exercises with PT since her humerus fracture 9 months ago but said this felt different than her regular MSK pain. She states it was constant and pressure like. She noted her blood pressure at home to be high. She broke her left shoulder 9 months ago. Per chart review, her last physical therapy was in May. She denies any SOB, fevers, abdominal pain, nausea, vomiting or diarrhea. In the ED, her vital signs are stable with the exception of /103. Labs shows: normal CMP. Glucose 161. Trop 7. BNP 85. TSH 1.45. CBC unremarkable with the exception of hemoglobin 15.8. D-dimer <0.27. Chest x ray shows:  No focal consolidation, pleural effusion or pneumothorax. Cardiomediastinal silhouette is unremarkable. Since 2012, similar tiny radiodensity projecting over the left lower hemithorax, probably within the left breast. EKG shows sinus rhythm. Plan: Admit to ED observation for ACS rule out. Upon arrival to ED observation patient is stable and chest pain is significantly improved from this morning but can still be felt at times.   -Dobutamine stress test  -Telemetry   -VS q 4h  -Asprin daily  -Tylenol and Nitroglycerin  "PRN  -Second Trop now  -Continue PTA lisinopril    ##CKD  Cr is 1.0. This is around patient baseline (1.0-1.15)     Diet:  Cardiac diet, NPO at midnight  DVT Prophylaxis: Ambulate every shift  Song Catheter: Not present  Lines: None     Cardiac Monitoring: None  Code Status:  Full Code    Clinically Significant Risk Factors Present on Admission                  # Hypertension: Noted on problem list      # Obesity: Estimated body mass index is 32.79 kg/m  as calculated from the following:    Height as of this encounter: 1.626 m (5' 4\").    Weight as of this encounter: 86.6 kg (191 lb).              Disposition Plan      Expected Discharge Date: 08/24/2023                The patient's care was discussed with the Bedside Nurse, Patient, and ED Admitting Physician Dr. Vigil .    GAVIN Man CNP  ED Observation  [facility]  Securely message with [employer] (more info)  Text page via [facility] Paging/Directory     ______________________________________________________________________    Chief Complaint   Chest Pain     History is obtained from the patient    History of Present Illness   Per ED note, \"Franchesca Hyatt is a 62 year old female with past medical history of Stage III CKD, hypertension, hyperlipidemia, and recent displaced fracture of proximal end of left humerus who presents to the emergency department for evaluation of chest pain. Patient states that she has never had this chest pain before. The chest pain developed last night. She states that she began to feel strange even though she wasn't out in the heat much. She has a blood pressure reading machine at home and noted systolic number at 184. She broke her left shoulder 9 months ago. Per chart review, her last physical therapy was in May. Patient is not diabetic. No smoking.  \"      Past Medical History    Past Medical History:   Diagnosis Date    Calculus of kidney     Calculus, kidney 2007    " CKD (chronic kidney disease) stage 3, GFR 30-59 ml/min (H)     Hypercholesteremia 2018    Hypertension goal BP (blood pressure) < 140/90 2012    Seasonal allergic rhinitis     seasonal allergies, cats    Tubular adenoma of colon 10/7/2014    Colonoscopy 10/1/2014, repeat 2019    Urinary tract infection, site not specified  to early     Past history of        Past Surgical History   Past Surgical History:   Procedure Laterality Date    BIOPSY BREAST      X 2    COLONOSCOPY  6/15/2009    Polyp - Due in 5 years    CYSTOSCOPY, RETROGRADES, MANIPULATE STONE, INSERT STENT, COMBINED      EXCISE PILONIDAL CYST, EXTENSIVE      LASER REVOLIX LITHOTRIPSY URETER(S), INSERT STENT, COMBINED      Sebaceous Cyst Removal      Back       Prior to Admission Medications   Prior to Admission Medications   Prescriptions Last Dose Informant Patient Reported? Taking?   ACETAMINOPHEN PO   Yes No   Sig: Take 500 mg by mouth every 6 hours as needed   ACIDOPHILUS OR CAPS   Yes No   Si caps. daily   Ascorbic Acid (VITAMIN C PO)   Yes No   Cyanocobalamin (VITAMIN B 12 PO)   Yes No   Sig: Take by mouth daily    DIGEST ENZYMES-ANTICHOLINERGIC OR CAPS   Yes No   Flaxseed, Linseed, (FLAX SEED OIL PO)   Yes No   Sig: Take by mouth daily   MAGNESIUM 300 MG OR CAPS   Yes No   Sig: At Bedtime    Multiple Vitamins-Minerals (ZINC PO)   Yes No   Sig: Take by mouth daily ZINC    Nettle, Urtica Dioica, (NETTLE LEAF PO)   Yes No   VITAMIN D OR   Yes No   Si cap daily in the winter   ciprofloxacin (CIPRO) 500 MG tablet   No No   Sig: Take 1 tablet (500 mg) by mouth 2 times daily   hydrOXYzine (ATARAX) 25 MG tablet   No No   Sig: Take 1/2-2 tabs po HS prn itching   lidocaine (LIDODERM) 5 % patch   No No   Sig: Place 2 patches onto the skin every 24 hours To prevent lidocaine toxicity, patient should be patch free for 12 hrs daily.   lisinopril (ZESTRIL) 10 MG tablet   No No   Sig: Take 1 tablet (10 mg) by mouth 2 times  daily   ondansetron (ZOFRAN ODT) 4 MG ODT tab   No No   Sig: Take 1 tablet (4 mg) by mouth every 8 hours as needed for nausea   order for DME   No No   Sig: Equipment being ordered: SAD lite box      Facility-Administered Medications: None          Physical Exam   Vital Signs: Temp: 98.4  F (36.9  C)   BP: (!) 147/103 Pulse: 86   Resp: 13 SpO2: 98 % O2 Device: Nasal cannula Oxygen Delivery: 2 LPM  Weight: 191 lbs 0 oz    Constitutional: awake, alert, cooperative, no apparent distress, and appears stated age  Eyes: Lids and lashes normal, pupils equal, round and reactive to light, extra ocular muscles intact, sclera clear, conjunctiva normal  ENT: Normocephalic, without obvious abnormality, atraumatic,  Hematologic / Lymphatic: no cervical lymphadenopathy  Respiratory: No increased work of breathing, good air exchange, clear to auscultation bilaterally, no crackles or wheezing  Cardiovascular: Normal apical impulse, regular rate and rhythm, normal S1 and S2, no S3 or S4, and no murmur noted  GI: No scars, normal bowel sounds, soft, non-distended, non-tender, no masses palpated, no hepatosplenomegally  Skin: normal skin color, texture, turgor  Musculoskeletal: There is no redness, warmth, or swelling of the joints.  Full range of motion noted.  Motor strength is 5 out of 5 all extremities bilaterally.    Neurologic: Awake, alert, oriented to name, place and time.  Cranial nerves II-XII are grossly intact.  Motor is 5 out of 5 bilaterally.    Neuropsychiatric: General: normal, calm, and normal eye contact    Medical Decision Making       30 MINUTES SPENT BY ME on the date of service doing chart review, history, exam, documentation & further activities per the note.      Data   ------------------------- PAST 24 HR DATA REVIEWED -----------------------------------------------    I have personally reviewed the following data over the past 24 hrs:    5.1  \   15.8 (H)   / 218     141 103 11.5 /  161 (H)   3.8 24 1.00 (H) \      ALT: 23 AST: 20 AP: 61 TBILI: 1.6 (H)   ALB: 4.6 TOT PROTEIN: 6.9 LIPASE: N/A     Trop: 7 BNP: 85     TSH: 1.45 T4: N/A A1C: N/A     INR:  N/A PTT:  N/A   D-dimer:  <0.27 Fibrinogen:  N/A       Imaging results reviewed over the past 24 hrs:   Recent Results (from the past 24 hour(s))   XR Chest 2 Views    Narrative    CHEST TWO VIEWS 8/23/2023 11:53 AM     HISTORY: Chest pain, shortness of breath.    COMPARISON: Chest radiograph 3/28/2012.      Impression    IMPRESSION: No focal consolidation, pleural effusion or pneumothorax.  Cardiomediastinal silhouette is unremarkable. Since 2012, similar tiny  radiodensity projecting over the left lower hemithorax, probably  within the left breast.

## 2023-08-23 NOTE — PROVIDER NOTIFICATION
Patient arrived on litter from [facility]. TED notified. Patient placed on Tele.

## 2023-08-24 ENCOUNTER — APPOINTMENT (OUTPATIENT)
Dept: CARDIOLOGY | Facility: CLINIC | Age: 63
End: 2023-08-24
Payer: COMMERCIAL

## 2023-08-24 VITALS
WEIGHT: 212.96 LBS | TEMPERATURE: 98.1 F | HEART RATE: 67 BPM | HEIGHT: 64 IN | SYSTOLIC BLOOD PRESSURE: 144 MMHG | OXYGEN SATURATION: 97 % | DIASTOLIC BLOOD PRESSURE: 92 MMHG | BODY MASS INDEX: 36.36 KG/M2 | RESPIRATION RATE: 18 BRPM

## 2023-08-24 PROCEDURE — 258N000003 HC RX IP 258 OP 636

## 2023-08-24 PROCEDURE — 93325 DOPPLER ECHO COLOR FLOW MAPG: CPT | Mod: 26 | Performed by: INTERNAL MEDICINE

## 2023-08-24 PROCEDURE — 258N000003 HC RX IP 258 OP 636: Performed by: INTERNAL MEDICINE

## 2023-08-24 PROCEDURE — 255N000002 HC RX 255 OP 636: Performed by: INTERNAL MEDICINE

## 2023-08-24 PROCEDURE — 250N000013 HC RX MED GY IP 250 OP 250 PS 637

## 2023-08-24 PROCEDURE — G0378 HOSPITAL OBSERVATION PER HR: HCPCS

## 2023-08-24 PROCEDURE — 96361 HYDRATE IV INFUSION ADD-ON: CPT

## 2023-08-24 PROCEDURE — 99238 HOSP IP/OBS DSCHRG MGMT 30/<: CPT

## 2023-08-24 PROCEDURE — C8928 TTE W OR W/O FOL W/CON,STRES: HCPCS

## 2023-08-24 PROCEDURE — 96360 HYDRATION IV INFUSION INIT: CPT | Mod: 59

## 2023-08-24 PROCEDURE — 93325 DOPPLER ECHO COLOR FLOW MAPG: CPT | Mod: TC

## 2023-08-24 PROCEDURE — 93018 CV STRESS TEST I&R ONLY: CPT | Performed by: INTERNAL MEDICINE

## 2023-08-24 PROCEDURE — 93350 STRESS TTE ONLY: CPT | Mod: 26 | Performed by: INTERNAL MEDICINE

## 2023-08-24 PROCEDURE — 93016 CV STRESS TEST SUPVJ ONLY: CPT | Performed by: INTERNAL MEDICINE

## 2023-08-24 PROCEDURE — 93321 DOPPLER ECHO F-UP/LMTD STD: CPT | Mod: 26 | Performed by: INTERNAL MEDICINE

## 2023-08-24 PROCEDURE — 93321 DOPPLER ECHO F-UP/LMTD STD: CPT | Mod: TC

## 2023-08-24 PROCEDURE — 250N000009 HC RX 250: Performed by: INTERNAL MEDICINE

## 2023-08-24 PROCEDURE — 250N000011 HC RX IP 250 OP 636: Performed by: INTERNAL MEDICINE

## 2023-08-24 RX ORDER — METOPROLOL TARTRATE 1 MG/ML
1-20 INJECTION, SOLUTION INTRAVENOUS
Status: ACTIVE | OUTPATIENT
Start: 2023-08-24 | End: 2023-08-24

## 2023-08-24 RX ORDER — ACETAMINOPHEN 325 MG/1
975 TABLET ORAL EVERY 6 HOURS PRN
Status: DISCONTINUED | OUTPATIENT
Start: 2023-08-24 | End: 2023-08-24 | Stop reason: HOSPADM

## 2023-08-24 RX ORDER — DOBUTAMINE HYDROCHLORIDE 200 MG/100ML
10-50 INJECTION INTRAVENOUS CONTINUOUS
Status: ACTIVE | OUTPATIENT
Start: 2023-08-24 | End: 2023-08-24

## 2023-08-24 RX ORDER — ATROPINE SULFATE 0.4 MG/ML
.2-2 AMPUL (ML) INJECTION
Status: DISCONTINUED | OUTPATIENT
Start: 2023-08-24 | End: 2023-08-24 | Stop reason: HOSPADM

## 2023-08-24 RX ORDER — SODIUM CHLORIDE 9 MG/ML
INJECTION, SOLUTION INTRAVENOUS CONTINUOUS
Status: DISCONTINUED | OUTPATIENT
Start: 2023-08-24 | End: 2023-08-24 | Stop reason: HOSPADM

## 2023-08-24 RX ADMIN — LISINOPRIL 10 MG: 10 TABLET ORAL at 08:37

## 2023-08-24 RX ADMIN — DOBUTAMINE 10 MCG/KG/MIN: 12.5 INJECTION, SOLUTION, CONCENTRATE INTRAVENOUS at 09:24

## 2023-08-24 RX ADMIN — METOPROLOL TARTRATE 2 MG: 5 INJECTION INTRAVENOUS at 09:36

## 2023-08-24 RX ADMIN — PERFLUTREN 7 ML: 6.52 INJECTION, SUSPENSION INTRAVENOUS at 09:37

## 2023-08-24 RX ADMIN — SODIUM CHLORIDE: 9 INJECTION, SOLUTION INTRAVENOUS at 08:33

## 2023-08-24 RX ADMIN — ACETAMINOPHEN 975 MG: 325 TABLET, FILM COATED ORAL at 08:37

## 2023-08-24 RX ADMIN — ASPIRIN 81 MG: 81 TABLET ORAL at 08:37

## 2023-08-24 ASSESSMENT — ACTIVITIES OF DAILY LIVING (ADL)
ADLS_ACUITY_SCORE: 31

## 2023-08-24 NOTE — PLAN OF CARE
Observation Goals:   Serial troponins and stress test complete: Not Met  Seen and cleared by consultant if applicable: Not Met  Adequate pain control on oral analgesia: Met  Vital signs normal or at patient baseline: Met  Safe disposition plan has been identified: Not Met

## 2023-08-24 NOTE — DISCHARGE SUMMARY
"[facility]  ED Observation Discharge Summary      Date of Admission:  8/23/2023  Date of Discharge:  8/24/2023  Discharging Provider: GAVIN Man CNP  Discharge Service: ED Observation    Discharge Diagnoses   Chest Pain    Clinically Significant Risk Factors     # Obesity: Estimated body mass index is 36.56 kg/m  as calculated from the following:    Height as of this encounter: 1.626 m (5' 4\").    Weight as of this encounter: 96.6 kg (212 lb 15.4 oz).       Follow-ups Needed After Discharge   We recommend you follow up with your PCP within 1 week for hospital follow up.    Discharge Disposition   Discharged to home  Condition at discharge: Stable    Hospital Course   Franchesca Hyatt is a 62 year old female admitted on 8/23/2023. She has a PMH significant for CKD, HTN, HLD, and recent displaced fracture of proximal end of the left humerus who presents to the ED for evaluation of chest pain     ##Chest Pain  ##hx HTN, HLD and recent displaced left humerus fracture  Patient presents to the ED with complaint of chest pain that started this morning. She reports she has felt off all summer but thought it was her allergies and the poor air quality. She reports that this morning she began to feel a chest pressure that she hadn't felt before and it radiated down her left arm. She has been doing a lot of left shoulder exercises with PT since her humerus fracture 9 months ago but said this felt different than her regular MSK pain. She states it was constant and pressure like. She noted her blood pressure at home to be high. She broke her left shoulder 9 months ago. Per chart review, her last physical therapy was in May. She denies any SOB, fevers, abdominal pain, nausea, vomiting or diarrhea. In the ED, her vital signs are stable with the exception of /103. Labs shows: normal CMP. Glucose 161. Trop 7. BNP 85. TSH 1.45. CBC unremarkable with the exception of " hemoglobin 15.8. D-dimer <0.27. Chest x ray shows:  No focal consolidation, pleural effusion or pneumothorax. Cardiomediastinal silhouette is unremarkable. Since 2012, similar tiny radiodensity projecting over the left lower hemithorax, probably within the left breast. EKG shows sinus rhythm. Plan: Admit to ED observation for ACS rule out. Upon arrival to ED observation patient is stable and chest pain is significantly improved from this morning but can still be felt at times. You underwent a dobutamine stress test and the results showed Normal dobutamine stress echocardiogram without evidence of inducible  ischemia. Follow up with PCP within 1 week. At the time of discharge, VSS, labwork stable and cardiac workup negative. Ambulating independently. Stable for discharge.     ##CKD  Cr is 1.0. This is around patient baseline (1.0-1.15)    Consultations This Hospital Stay   None    Code Status   Full Code    Time Spent on this Encounter   I, GAVIN Man CNP, personally saw the patient today and spent less than or equal to 30 minutes discharging this patient.       GAVIN Man CNP  [facility] ED UNIT [address]D OBSERVATION [address] [address]  Phone: [phone]  Fax: [phone]  ______________________________________________________________________    Physical Exam   Vital Signs: Temp: 98.3  F (36.8  C) Temp src: Oral BP: 119/84 Pulse: 56   Resp: 18 SpO2: 98 % O2 Device: None (Room air) Oxygen Delivery: 2 LPM  Weight: 212 lbs 15.43 oz      Constitutional: awake, alert, cooperative, no apparent distress, and appears stated age  Eyes: Lids and lashes normal, pupils equal, round and reactive to light, extra ocular muscles intact, sclera clear, conjunctiva normal  ENT: Normocephalic, without obvious abnormality, atraumatic,  Hematologic / Lymphatic: no cervical lymphadenopathy  Respiratory: No increased work of breathing, good air exchange, clear  to auscultation bilaterally, no crackles or wheezing  Cardiovascular: Normal apical impulse, regular rate and rhythm, normal S1 and S2, no S3 or S4, and no murmur noted  GI: No scars, normal bowel sounds, soft, non-distended, non-tender, no masses palpated, no hepatosplenomegally  Skin: normal skin color, texture, turgor  Musculoskeletal: There is no redness, warmth, or swelling of the joints.  Full range of motion noted.  Motor strength is 5 out of 5 all extremities bilaterally.    Neurologic: Awake, alert, oriented to name, place and time.  Cranial nerves II-XII are grossly intact.  Motor is 5 out of 5 bilaterally.    Neuropsychiatric: General: normal, calm, and normal eye contact       Primary Care Physician   Monica Lemus    Discharge Orders      Reason for your hospital stay    Chest Pain     Activity    Your activity upon discharge: activity as tolerated     Follow Up and recommended labs and tests    Follow up with primary care provider, Monica Lemus, within 7 days for hospital follow- up.  No follow up labs or test are needed.     When to contact your care team    Return to the ED with fever, uncontrolled nausea, vomiting, unrelieved pain, bleeding not relieved with pressure, dizziness, chest pain, shortness of breath, loss of consciousness, and any new or concerning symptoms.     Discharge Instructions    You were admitted to ED observation for evaluation of chest pain. Your vital signs were stable. You chest x ray was normal. Labwork stable including normal troponins. EKG showed normal sinus rhythm and d-dimer normal. You underwent a dobutamine stress test which was normal. We recommend you follow up with your PCP and continue your PTA medication as previously prescribed.     Diet    Follow this diet upon discharge: Regular         Significant Results and Procedures   Most Recent 3 CBC's:  Recent Labs   Lab Test 08/23/23  1104 03/10/22  1358 12/17/21  0917 11/17/17  0907   WBC 5.1 6.2  --   4.3   HGB 15.8* 14.6 14.7 15.0   MCV 93 92  --  93    235  --  227     Most Recent 3 BMP's:  Recent Labs   Lab Test 08/23/23  1104 10/13/22  0848 03/10/22  1358    139 140   POTASSIUM 3.8 4.1 4.0   CHLORIDE 103 108 109   CO2 24 23 26   BUN 11.5 15 15   CR 1.00* 1.06* 1.11*   ANIONGAP 14 8 5   JUSTO 10.0 9.3 9.6   * 117* 100*     Most Recent 2 LFT's:  Recent Labs   Lab Test 08/23/23  1104 10/13/22  0848   AST 20 12   ALT 23 35   ALKPHOS 61 57   BILITOTAL 1.6* 1.3     Most Recent 3 INR's:No lab results found.  Most Recent INR's and Anticoagulation Dosing History:  Anticoagulation Dose History          Latest Ref Rng & Units 7/13/2007 3/28/2012   Recent Dosing and Labs   INR 0.86 - 1.14 1.04  1.02      Most Recent 3 Creatinines:  Recent Labs   Lab Test 08/23/23  1104 10/13/22  0848 03/10/22  1358   CR 1.00* 1.06* 1.11*     Most Recent 3 Hemoglobins:  Recent Labs   Lab Test 08/23/23  1104 03/10/22  1358 12/17/21  0917   HGB 15.8* 14.6 14.7     Most Recent 3 Troponin's:No lab results found.  Most Recent 3 BNP's:  Recent Labs   Lab Test 08/23/23  1104   NTBNPI 85     Most Recent D-dimer:  Recent Labs   Lab Test 08/23/23  1104   DD <0.27     Most Recent Cholesterol Panel:  Recent Labs   Lab Test 10/13/22  0848   CHOL 202*   *   HDL 32*   TRIG 177*     7-Day Micro Results       No results found for the last 168 hours.          Most Recent TSH and T4:  Recent Labs   Lab Test 08/23/23  1104   TSH 1.45     Most Recent Hemoglobin A1c:  Recent Labs   Lab Test 10/13/22  0848   A1C 5.8*     Most Recent 6 glucoses:  Recent Labs   Lab Test 08/23/23  1104 10/13/22  0848 03/10/22  1358 12/17/21  0916 04/01/21  0829 11/22/19  0817   * 117* 100* 110* 106* 104*   ,   Results for orders placed or performed during the hospital encounter of 08/23/23   XR Chest 2 Views    Narrative    CHEST TWO VIEWS 8/23/2023 11:53 AM     HISTORY: Chest pain, shortness of breath.    COMPARISON: Chest radiograph 3/28/2012.       Impression    IMPRESSION: No focal consolidation, pleural effusion or pneumothorax.  Cardiomediastinal silhouette is unremarkable. Since 2012, similar tiny  radiodensity projecting over the left lower hemithorax, probably  within the left breast.    CARLOS WEBSTER MD         SYSTEM ID:  [id number]       Discharge Medications   Current Discharge Medication List        CONTINUE these medications which have NOT CHANGED    Details   ACETAMINOPHEN PO Take 500 mg by mouth every 6 hours as needed      ACIDOPHILUS OR CAPS 2 caps. daily  Refills: 0      Ascorbic Acid (VITAMIN C PO)       ciprofloxacin (CIPRO) 500 MG tablet Take 1 tablet (500 mg) by mouth 2 times daily  Qty: 6 tablet, Refills: 0    Associated Diagnoses: Traveler's diarrhea      Cyanocobalamin (VITAMIN B 12 PO) Take by mouth daily       DIGEST ENZYMES-ANTICHOLINERGIC OR CAPS       Flaxseed, Linseed, (FLAX SEED OIL PO) Take by mouth daily      hydrOXYzine (ATARAX) 25 MG tablet Take 1/2-2 tabs po HS prn itching  Qty: 30 tablet, Refills: 1    Associated Diagnoses: Psoriasis      lidocaine (LIDODERM) 5 % patch Place 2 patches onto the skin every 24 hours To prevent lidocaine toxicity, patient should be patch free for 12 hrs daily.  Qty: 30 patch, Refills: 0      lisinopril (ZESTRIL) 10 MG tablet Take 1 tablet (10 mg) by mouth 2 times daily  Qty: 180 tablet, Refills: 3    Associated Diagnoses: Essential hypertension with goal blood pressure less than 140/90; Stage 3a chronic kidney disease (H)      MAGNESIUM 300 MG OR CAPS At Bedtime       Multiple Vitamins-Minerals (ZINC PO) Take by mouth daily ZINC       Nettle, Urtica Dioica, (NETTLE LEAF PO)       ondansetron (ZOFRAN ODT) 4 MG ODT tab Take 1 tablet (4 mg) by mouth every 8 hours as needed for nausea  Qty: 20 tablet, Refills: 0    Associated Diagnoses: Nausea      order for DME Equipment being ordered: SAD lite box  Qty: 1 each, Refills: 0    Associated Diagnoses: Seasonal affective disorder (H)       VITAMIN D OR 1 cap daily in the winter           Allergies   Allergies   Allergen Reactions    Seasonal Allergies     Vicodin [Hydrocodone-Acetaminophen]

## 2023-08-24 NOTE — PROGRESS NOTES
Pt here for dobutamine stress test. Test, meds and side effects reviewed with patient. Achieved target HR at 30 mcg Dobutamine. Gave a total of 2 mg IV Metoprolol to bring HR back to baseline. Post monitoring complete and VSS. Pt transferred back to obs unit via wheelchair. Report given to AYANNA Garsia.    Sondra Rebolledo RN

## 2023-08-24 NOTE — PLAN OF CARE
"Outpatient/Observation goals to be met before discharge home:   List all goals to be met before discharge home:  - Serial troponins and stress test complete. - Not Met, plan for Dobutamine Stress Test in am  - Seen and cleared by consultant if applicable - Not Met  - Adequate pain control on oral analgesia - Met, denies pain at this time  - Vital signs normal or at patient baseline - Met  /87 (BP Location: Left arm, Patient Position: Supine, Cuff Size: Adult Large)   Pulse 72   Temp 98.5  F (36.9  C) (Oral)   Resp 19   Ht 1.626 m (5' 4\")   Wt 96.6 kg (212 lb 15.4 oz)   LMP 06/08/2014   SpO2 95%   BMI 36.56 kg/m    - Safe disposition plan has been identified - Not Met    Nurse to notify provider when observation goals have been met and patient is ready for discharge   "

## 2023-08-25 ENCOUNTER — PATIENT OUTREACH (OUTPATIENT)
Dept: FAMILY MEDICINE | Facility: CLINIC | Age: 63
End: 2023-08-25
Payer: COMMERCIAL

## 2023-08-25 ENCOUNTER — MYC MEDICAL ADVICE (OUTPATIENT)
Dept: FAMILY MEDICINE | Facility: CLINIC | Age: 63
End: 2023-08-25
Payer: COMMERCIAL

## 2023-08-25 NOTE — TELEPHONE ENCOUNTER
ED / Discharge Outreach Protocol    Patient Contact    Attempt # 1    Was call answered?  No.  Left message on voicemail with information to call me back.  Yumi Guallpa RN  [facility]

## 2023-08-31 NOTE — TELEPHONE ENCOUNTER
ED / Discharge Outreach Protocol     Patient Contact     Attempt # 2     Was call answered?  No.  Left message on voicemail with information to call me back.    Yessica Arriaga, JAELN RN  [facility]

## 2023-09-08 NOTE — PROGRESS NOTES
Patient did not return for further treatment and no additional progress was noted.  Please refer to the progress note and goal flowsheet completed on 05/11/23 for discharge information.

## 2023-10-28 ENCOUNTER — HEALTH MAINTENANCE LETTER (OUTPATIENT)
Age: 63
End: 2023-10-28

## 2023-11-16 ENCOUNTER — PATIENT OUTREACH (OUTPATIENT)
Dept: CARE COORDINATION | Facility: CLINIC | Age: 63
End: 2023-11-16
Payer: COMMERCIAL

## 2023-12-05 ENCOUNTER — MYC REFILL (OUTPATIENT)
Dept: FAMILY MEDICINE | Facility: CLINIC | Age: 63
End: 2023-12-05
Payer: COMMERCIAL

## 2023-12-05 ENCOUNTER — MYC MEDICAL ADVICE (OUTPATIENT)
Dept: FAMILY MEDICINE | Facility: CLINIC | Age: 63
End: 2023-12-05
Payer: COMMERCIAL

## 2023-12-05 DIAGNOSIS — I10 ESSENTIAL HYPERTENSION WITH GOAL BLOOD PRESSURE LESS THAN 140/90: ICD-10-CM

## 2023-12-05 DIAGNOSIS — N18.31 STAGE 3A CHRONIC KIDNEY DISEASE (H): ICD-10-CM

## 2023-12-05 DIAGNOSIS — E78.00 HYPERCHOLESTEREMIA: Primary | ICD-10-CM

## 2023-12-05 DIAGNOSIS — R73.01 IMPAIRED FASTING GLUCOSE: ICD-10-CM

## 2023-12-05 DIAGNOSIS — E78.5 HYPERLIPIDEMIA LDL GOAL <130: ICD-10-CM

## 2023-12-05 DIAGNOSIS — N18.31 CHRONIC KIDNEY DISEASE, STAGE 3A (H): ICD-10-CM

## 2023-12-06 NOTE — TELEPHONE ENCOUNTER
-- Patient requesting lab orders before 1/18/24 visit. Not all are in HMO.     Pended.     Marlys Willard RN  [facility]

## 2023-12-07 RX ORDER — LISINOPRIL 10 MG/1
10 TABLET ORAL 2 TIMES DAILY
Qty: 180 TABLET | Refills: 0 | Status: SHIPPED | OUTPATIENT
Start: 2023-12-07 | End: 2024-03-07

## 2023-12-14 ENCOUNTER — PATIENT OUTREACH (OUTPATIENT)
Dept: CARE COORDINATION | Facility: CLINIC | Age: 63
End: 2023-12-14
Payer: COMMERCIAL

## 2023-12-21 ENCOUNTER — PATIENT OUTREACH (OUTPATIENT)
Dept: GASTROENTEROLOGY | Facility: CLINIC | Age: 63
End: 2023-12-21
Payer: COMMERCIAL

## 2024-01-06 ENCOUNTER — HEALTH MAINTENANCE LETTER (OUTPATIENT)
Age: 64
End: 2024-01-06

## 2024-01-16 ASSESSMENT — ENCOUNTER SYMPTOMS
WEAKNESS: 0
SORE THROAT: 0
SHORTNESS OF BREATH: 0
HEARTBURN: 0
NERVOUS/ANXIOUS: 0
FEVER: 0
CONSTIPATION: 0
JOINT SWELLING: 0
HEMATOCHEZIA: 0
PARESTHESIAS: 0
ABDOMINAL PAIN: 0
EYE PAIN: 0
DIARRHEA: 0
CHILLS: 0
COUGH: 0
ARTHRALGIAS: 0
MYALGIAS: 0
FREQUENCY: 0
DYSURIA: 0
DIZZINESS: 0
HEMATURIA: 0
BREAST MASS: 0
PALPITATIONS: 0
HEADACHES: 0
NAUSEA: 0

## 2024-01-18 ENCOUNTER — OFFICE VISIT (OUTPATIENT)
Dept: FAMILY MEDICINE | Facility: CLINIC | Age: 64
End: 2024-01-18
Payer: COMMERCIAL

## 2024-01-18 VITALS
BODY MASS INDEX: 32.06 KG/M2 | OXYGEN SATURATION: 98 % | SYSTOLIC BLOOD PRESSURE: 138 MMHG | HEIGHT: 65 IN | DIASTOLIC BLOOD PRESSURE: 72 MMHG | WEIGHT: 192.4 LBS | RESPIRATION RATE: 21 BRPM | HEART RATE: 70 BPM | TEMPERATURE: 97.7 F

## 2024-01-18 DIAGNOSIS — E78.5 HYPERLIPIDEMIA LDL GOAL <130: ICD-10-CM

## 2024-01-18 DIAGNOSIS — N18.31 CHRONIC KIDNEY DISEASE, STAGE 3A (H): ICD-10-CM

## 2024-01-18 DIAGNOSIS — Z12.31 VISIT FOR SCREENING MAMMOGRAM: ICD-10-CM

## 2024-01-18 DIAGNOSIS — E78.00 HYPERCHOLESTEREMIA: ICD-10-CM

## 2024-01-18 DIAGNOSIS — I10 ESSENTIAL HYPERTENSION WITH GOAL BLOOD PRESSURE LESS THAN 140/90: ICD-10-CM

## 2024-01-18 DIAGNOSIS — Z00.00 ROUTINE GENERAL MEDICAL EXAMINATION AT A HEALTH CARE FACILITY: Primary | ICD-10-CM

## 2024-01-18 DIAGNOSIS — L40.9 PSORIASIS: ICD-10-CM

## 2024-01-18 DIAGNOSIS — R73.01 IMPAIRED FASTING GLUCOSE: ICD-10-CM

## 2024-01-18 LAB
BASOPHILS # BLD AUTO: 0 10E3/UL (ref 0–0.2)
BASOPHILS NFR BLD AUTO: 0 %
EOSINOPHIL # BLD AUTO: 0.1 10E3/UL (ref 0–0.7)
EOSINOPHIL NFR BLD AUTO: 3 %
ERYTHROCYTE [DISTWIDTH] IN BLOOD BY AUTOMATED COUNT: 12.2 % (ref 10–15)
HBA1C MFR BLD: 5.9 % (ref 0–5.6)
HCT VFR BLD AUTO: 42.8 % (ref 35–47)
HGB BLD-MCNC: 14 G/DL (ref 11.7–15.7)
IMM GRANULOCYTES # BLD: 0 10E3/UL
IMM GRANULOCYTES NFR BLD: 0 %
LYMPHOCYTES # BLD AUTO: 1.8 10E3/UL (ref 0.8–5.3)
LYMPHOCYTES NFR BLD AUTO: 40 %
MCH RBC QN AUTO: 31.1 PG (ref 26.5–33)
MCHC RBC AUTO-ENTMCNC: 32.7 G/DL (ref 31.5–36.5)
MCV RBC AUTO: 95 FL (ref 78–100)
MONOCYTES # BLD AUTO: 0.3 10E3/UL (ref 0–1.3)
MONOCYTES NFR BLD AUTO: 7 %
NEUTROPHILS # BLD AUTO: 2.2 10E3/UL (ref 1.6–8.3)
NEUTROPHILS NFR BLD AUTO: 49 %
PLATELET # BLD AUTO: 241 10E3/UL (ref 150–450)
RBC # BLD AUTO: 4.5 10E6/UL (ref 3.8–5.2)
WBC # BLD AUTO: 4.5 10E3/UL (ref 4–11)

## 2024-01-18 PROCEDURE — 80061 LIPID PANEL: CPT | Performed by: FAMILY MEDICINE

## 2024-01-18 PROCEDURE — 83036 HEMOGLOBIN GLYCOSYLATED A1C: CPT | Performed by: FAMILY MEDICINE

## 2024-01-18 PROCEDURE — 80053 COMPREHEN METABOLIC PANEL: CPT | Performed by: FAMILY MEDICINE

## 2024-01-18 PROCEDURE — 82570 ASSAY OF URINE CREATININE: CPT | Performed by: FAMILY MEDICINE

## 2024-01-18 PROCEDURE — 99396 PREV VISIT EST AGE 40-64: CPT | Performed by: FAMILY MEDICINE

## 2024-01-18 PROCEDURE — 36415 COLL VENOUS BLD VENIPUNCTURE: CPT | Performed by: FAMILY MEDICINE

## 2024-01-18 PROCEDURE — 82043 UR ALBUMIN QUANTITATIVE: CPT | Performed by: FAMILY MEDICINE

## 2024-01-18 PROCEDURE — 85025 COMPLETE CBC W/AUTO DIFF WBC: CPT | Performed by: FAMILY MEDICINE

## 2024-01-18 PROCEDURE — 99214 OFFICE O/P EST MOD 30 MIN: CPT | Mod: 25 | Performed by: FAMILY MEDICINE

## 2024-01-18 RX ORDER — HYDROXYZINE HYDROCHLORIDE 25 MG/1
TABLET, FILM COATED ORAL
Qty: 30 TABLET | Refills: 1 | Status: SHIPPED | OUTPATIENT
Start: 2024-01-18

## 2024-01-18 ASSESSMENT — ENCOUNTER SYMPTOMS
COUGH: 0
ABDOMINAL PAIN: 0
PALPITATIONS: 0
MYALGIAS: 0
HEADACHES: 0
NERVOUS/ANXIOUS: 0
ARTHRALGIAS: 0
FEVER: 0
JOINT SWELLING: 0
WEAKNESS: 0
HEMATURIA: 0
EYE PAIN: 0
NAUSEA: 0
CHILLS: 0
DIZZINESS: 0
FREQUENCY: 0
DYSURIA: 0
SORE THROAT: 0
DIARRHEA: 0
CONSTIPATION: 0
SHORTNESS OF BREATH: 0

## 2024-01-18 ASSESSMENT — PAIN SCALES - GENERAL: PAINLEVEL: NO PAIN (0)

## 2024-01-18 NOTE — PROGRESS NOTES
Preventive Care Visit  [facility]  Monica Lemus MD, Family Medicine  Jan 18, 2024       SUBJECTIVE:   Franchesca is a 63 year old, presenting for the following:  Physical  And chronic disease management  Psoriasis flares  She's had recurrent severe flares, due to stress, possibly triggered by covid vaccines, sx can be quite bothersome.    Hyperlipidemia Follow-Up    Are you regularly taking any medication or supplement to lower your cholesterol?   No    Hypertension Follow-up    Do you check your blood pressure regularly outside of the clinic? Yes   Are your blood pressures ever more than 140 on the top number (systolic) OR more   than 90 on the bottom number (diastolic), for example 140/90? No    Chronic Kidney Disease Follow-up    Do you take any over the counter pain medicine?: No     Impaired fasting glucose Follow-up    Patient is checking blood sugars: not at all  Diabetic concerns: None   Symptoms of hypoglycemia (low blood sugar): none   Paresthesias (numbness or burning in feet) or sores: No    Lab Results   Component Value Date    A1C 5.9 01/18/2024    A1C 5.8 10/13/2022    A1C 5.9 12/17/2021    A1C 6.0 04/01/2021    A1C 5.5 11/22/2019    A1C 5.5 11/20/2018    A1C 5.6 11/17/2017    A1C 5.6 11/15/2016         1/18/2024     9:21 AM   Additional Questions   Roomed by NESSA Singh   Accompanied by self         1/18/2024     9:21 AM   Patient Reported Additional Medications   Patient reports taking the following new medications none     Healthy Habits:     Getting at least 3 servings of Calcium per day:  Yes    Bi-annual eye exam:  Yes    Dental care twice a year:  Yes    Sleep apnea or symptoms of sleep apnea:  None    Diet:  Carbohydrate counting and Gluten-free/reduced    Frequency of exercise:  4-5 days/week    Duration of exercise:  45-60 minutes    Taking medications regularly:  Yes    Medication side effects:  None    Additional concerns today:  No      Today's PHQ-2  Score:       2024     8:58 AM   PHQ-2 (  [employer])   Q1: Little interest or pleasure in doing things 0   Q2: Feeling down, depressed or hopeless 0   PHQ-2 Score 0   Q1: Little interest or pleasure in doing things Not at all   Q2: Feeling down, depressed or hopeless Not at all   PHQ-2 Score 0       Have you ever done Advance Care Planning? (For example, a Health Directive, POLST, or a discussion with a medical provider or your loved ones about your wishes): Yes, patient states has an Advance Care Planning document and will bring a copy to the clinic.    Social History     Tobacco Use    Smoking status: Former     Packs/day: 0.00     Years: 5.00     Additional pack years: 0.00     Total pack years: 0.00     Types: Cigarettes     Start date: 1986     Quit date: 3/12/1991     Years since quittin.8    Smokeless tobacco: Never    Tobacco comments:     Was a light smoker for a few years in my youth.   Substance Use Topics    Alcohol use: Yes     Comment: rare - one glass per week of wine             2024    11:15 AM   Alcohol Use   Prescreen: >3 drinks/day or >7 drinks/week? No          No data to display              Reviewed orders with patient.  Reviewed health maintenance and updated orders accordingly - Yes  BP Readings from Last 3 Encounters:   24 138/72   23 (!) 144/92   10/31/22 (!) 171/91    Wt Readings from Last 3 Encounters:   24 87.3 kg (192 lb 6.4 oz)   23 96.6 kg (212 lb 15.4 oz)   22 88.9 kg (196 lb)                  Patient Active Problem List   Diagnosis    Family history of colonic polyps    Chronic kidney disease, stage 3a (H)    Tubular adenoma of colon    Impaired fasting glucose    Essential hypertension with goal blood pressure less than 140/90    Elevated bilirubin    Seasonal affective disorder (H24)    Abnormal weight gain    Obesity, Class I, BMI 30-34.9    Hypercholesteremia    Hyperlipidemia LDL goal <130    Blood type A+    Elevated blood  pressure reading without diagnosis of hypertension    ASCUS of cervix with negative high risk HPV    Fx humeral neck, left, closed, initial encounter    Other closed displaced fracture of proximal end of left humerus, initial encounter    Chest pain, unspecified type     Past Surgical History:   Procedure Laterality Date    BIOPSY BREAST      X 2    COLONOSCOPY  6/15/2009    Polyp - Due in 5 years    CYSTOSCOPY, RETROGRADES, MANIPULATE STONE, INSERT STENT, COMBINED      EXCISE PILONIDAL CYST, EXTENSIVE      LASER REVOLIX LITHOTRIPSY URETER(S), INSERT STENT, COMBINED      Sebaceous Cyst Removal      Back       Social History     Tobacco Use    Smoking status: Former     Packs/day: 0.00     Years: 5.00     Additional pack years: 0.00     Total pack years: 0.00     Types: Cigarettes     Start date: 1986     Quit date: 3/12/1991     Years since quittin.8    Smokeless tobacco: Never    Tobacco comments:     Was a light smoker for a few years in my youth.   Substance Use Topics    Alcohol use: Yes     Comment: rare - one glass per week of wine     Family History   Problem Relation Age of Onset    Hypertension Mother     Cerebrovascular Disease Mother         at age 50    Allergies Mother     Arthritis Mother         osteo    Depression Mother         Treated holistically    Cancer - colorectal Mother         BENIGN polyps 67 ()    Hypertension Father     Allergies Father     Depression Father         Took medication    Cerebrovascular Disease Father     Heart Disease Father         had heart surgery and a stroke     Cancer - colorectal Maternal Grandmother         at age 50    Alzheimer Disease Maternal Grandmother         diag at age 80    Eye Disorder Maternal Grandmother         cateracts    Osteoporosis Maternal Grandmother     Cerebrovascular Disease Maternal Grandmother     Colon Cancer Maternal Grandmother     Cancer Maternal Grandfather         smoker    Cancer Paternal Grandfather          smoker    Hypertension Brother     Depression Sister         Holistically    Gynecology Sister         cervical cancer at age 30    Cancer Sister         cervical         Current Outpatient Medications   Medication Sig Dispense Refill    ACIDOPHILUS OR CAPS 2 caps. daily  0    Ascorbic Acid (VITAMIN C PO)       Cyanocobalamin (VITAMIN B 12 PO) Take by mouth daily       DIGEST ENZYMES-ANTICHOLINERGIC OR CAPS       Flaxseed, Linseed, (FLAX SEED OIL PO) Take by mouth daily      hydrOXYzine HCl (ATARAX) 25 MG tablet Take 1/2-2 tabs po HS prn itching 30 tablet 1    lisinopril (ZESTRIL) 10 MG tablet Take 1 tablet (10 mg) by mouth 2 times daily 180 tablet 0    MAGNESIUM 300 MG OR CAPS At Bedtime       Multiple Vitamins-Minerals (ZINC PO) Take by mouth daily ZINC       Nettle, Urtica Dioica, (NETTLE LEAF PO)       order for DME Equipment being ordered: SAD lite box 1 each 0    VITAMIN D OR 1 cap daily in the winter       Allergies   Allergen Reactions    Seasonal Allergies     Vicodin [Hydrocodone-Acetaminophen]        Breast Cancer Screening:    FHS-7:       12/14/2021    11:52 AM 12/16/2021     8:37 AM 10/13/2022     3:07 PM 1/18/2024     8:58 AM   Breast CA Risk Assessment (FHS-7)   Did any of your first-degree relatives have breast or ovarian cancer? No No No No   Did any of your relatives have bilateral breast cancer? No No No No   Did any man in your family have breast cancer? No No No    Did any woman in your family have breast and ovarian cancer? No No No    Did any woman in your family have breast cancer before age 50 y? No No No    Do you have 2 or more relatives with breast and/or ovarian cancer? No No No    Do you have 2 or more relatives with breast and/or bowel cancer? No No No        Mammogram Screening: Recommended mammography every 1-2 years with patient discussion and risk factor consideration  Pertinent mammograms are reviewed under the imaging tab.    History of abnormal Pap smear: NO - age 30-65 PAP  "every 5 years with negative HPV co-testing recommended      Latest Ref Rng & Units 10/13/2022     3:56 PM 2017     9:14 AM 2017     9:05 AM   PAP / HPV   PAP  Negative for Intraepithelial Lesion or Malignancy (NILM)      PAP (Historical)    NIL    HPV 16 DNA Negative Negative  Negative     HPV 18 DNA Negative Negative  Negative     Other HR HPV Negative Negative  Negative       Reviewed and updated as needed this visit by clinical staff   Tobacco  Allergies  Meds              Reviewed and updated as needed this visit by Provider                  Past Medical History:   Diagnosis Date    Calculus of kidney     Calculus, kidney     CKD (chronic kidney disease) stage 3, GFR 30-59 ml/min (H)     Hypercholesteremia 2018    Hypertension goal BP (blood pressure) < 140/90 2012    Seasonal allergic rhinitis     seasonal allergies, cats    Tubular adenoma of colon 10/7/2014    Colonoscopy 10/1/2014, repeat 2019    Urinary tract infection, site not specified  to early     Past history of       Past Surgical History:   Procedure Laterality Date    BIOPSY BREAST      X 2    COLONOSCOPY  6/15/2009    Polyp - Due in 5 years    CYSTOSCOPY, RETROGRADES, MANIPULATE STONE, INSERT STENT, COMBINED      EXCISE PILONIDAL CYST, EXTENSIVE      LASER REVOLIX LITHOTRIPSY URETER(S), INSERT STENT, COMBINED      Sebaceous Cyst Removal  2008    Back     OB History    Para Term  AB Living   4 0 0 0 4 0   SAB IAB Ectopic Multiple Live Births   0 4 0 0 0      # Outcome Date GA Lbr Jeffery/2nd Weight Sex Delivery Anes PTL Lv   4 IAB            3 IAB            2 IAB            1 IAB                OBJECTIVE:   /72 (BP Location: Right arm, Patient Position: Sitting, Cuff Size: Adult Regular)   Pulse 70   Temp 97.7  F (36.5  C) (Temporal)   Resp 21   Ht 1.638 m (5' 4.5\")   Wt 87.3 kg (192 lb 6.4 oz)   LMP 2014   SpO2 98%   BMI 32.52 kg/m     Estimated body mass index is 32.52 " "kg/m  as calculated from the following:    Height as of this encounter: 1.638 m (5' 4.5\").    Weight as of this encounter: 87.3 kg (192 lb 6.4 oz).  Review of Systems   Constitutional:  Negative for chills and fever.   HENT:  Negative for congestion, ear pain, hearing loss and sore throat.    Eyes:  Negative for pain and visual disturbance.   Respiratory:  Negative for cough and shortness of breath.    Cardiovascular:  Negative for chest pain and palpitations.   Gastrointestinal:  Negative for abdominal pain, constipation, diarrhea and nausea.   Genitourinary:  Negative for dysuria, frequency, genital sores, hematuria, pelvic pain, urgency, vaginal bleeding and vaginal discharge.   Musculoskeletal:  Negative for arthralgias, joint swelling and myalgias.   Skin:  Negative for rash.   Neurological:  Negative for dizziness, weakness and headaches.   Psychiatric/Behavioral:  The patient is not nervous/anxious.      Physical Exam  GENERAL: alert and no distress  NECK: no adenopathy, no asymmetry, masses, or scars  RESP: lungs clear to auscultation - no rales, rhonchi or wheezes  BREAST: normal without masses, tenderness or nipple discharge and no palpable axillary masses or adenopathy; right inframammary fold; irritated SK noted, left inframammery fold; mild red erosion consistent with psoriatic plaque  CV: regular rate and rhythm, normal S1 S2, no S3 or S4, no murmur, click or rub, no peripheral edema  ABDOMEN: soft, nontender, no hepatosplenomegaly, no masses and bowel sounds normal  MS: no gross musculoskeletal defects noted, no edema  SKIN: no suspicious lesions or rashes  NEURO: Normal strength and tone, mentation intact and speech normal  PSYCH: mentation appears normal, affect normal/bright    Diagnostic Test Results:  Labs reviewed in Epic  Results for orders placed or performed in visit on 01/18/24 (from the past 24 hour(s))   Hemoglobin A1c   Result Value Ref Range    Hemoglobin A1C 5.9 (H) 0.0 - 5.6 %   CBC " with platelets and differential    Narrative    The following orders were created for panel order CBC with platelets and differential.  Procedure                               Abnormality         Status                     ---------                               -----------         ------                     CBC with platelets and d...[[id number]]                      Final result                 Please view results for these tests on the individual orders.   CBC with platelets and differential   Result Value Ref Range    WBC Count 4.5 4.0 - 11.0 10e3/uL    RBC Count 4.50 3.80 - 5.20 10e6/uL    Hemoglobin 14.0 11.7 - 15.7 g/dL    Hematocrit 42.8 35.0 - 47.0 %    MCV 95 78 - 100 fL    MCH 31.1 26.5 - 33.0 pg    MCHC 32.7 31.5 - 36.5 g/dL    RDW 12.2 10.0 - 15.0 %    Platelet Count 241 150 - 450 10e3/uL    % Neutrophils 49 %    % Lymphocytes 40 %    % Monocytes 7 %    % Eosinophils 3 %    % Basophils 0 %    % Immature Granulocytes 0 %    Absolute Neutrophils 2.2 1.6 - 8.3 10e3/uL    Absolute Lymphocytes 1.8 0.8 - 5.3 10e3/uL    Absolute Monocytes 0.3 0.0 - 1.3 10e3/uL    Absolute Eosinophils 0.1 0.0 - 0.7 10e3/uL    Absolute Basophils 0.0 0.0 - 0.2 10e3/uL    Absolute Immature Granulocytes 0.0 <=0.4 10e3/uL       ASSESSMENT/PLAN:   Routine general medical examination at a [facility] facility    Hyperlipidemia LDL goal <130  Hypercholesteremia  LDL Cholesterol Calculated   Date Value Ref Range Status   10/13/2022 135 (H) <=100 mg/dL Final   04/01/2021 146 (H) <100 mg/dL Final     Comment:     Above desirable:  100-129 mg/dl  Borderline High:  130-159 mg/dL  High:             160-189 mg/dL  Very high:       >189 mg/dl      Almost at goal, recheck today  - Lipid panel reflex to direct LDL Fasting; Future  - Comprehensive metabolic panel (BMP + Alb, Alk Phos, ALT, AST, Total. Bili, TP); Future  - Lipid panel reflex to direct LDL Fasting  - Comprehensive metabolic panel (BMP + Alb, Alk Phos, ALT, AST, Total. Bili,  TP)    Essential hypertension with goal blood pressure less than 140/90  BP Readings from Last 3 Encounters:   01/18/24 138/72   08/24/23 (!) 144/92   10/31/22 (!) 171/91    At goal  The current medical regimen is effective;  continue present plan and medications.    - Comprehensive metabolic panel (BMP + Alb, Alk Phos, ALT, AST, Total. Bili, TP)    Chronic kidney disease, stage 3a (H)  stable  GFR Estimate   Date Value Ref Range Status   08/23/2023 64 >60 mL/min/1.73m2 Final   10/13/2022 60 (L) >60 mL/min/1.73m2 Final     Comment:     Effective December 21, 2021 eGFRcr in adults is calculated using the 2021 CKD-EPI creatinine equation which includes age and gender (Milvia et al., NEJ, DOI: 10.1056/ZEFQqf7740875)   03/10/2022 57 (L) >60 mL/min/1.73m2 Final     Comment:     Effective December 21, 2021 eGFRcr in adults is calculated using the 2021 CKD-EPI creatinine equation which includes age and gender ( et al., NEJ, DOI: 10.1056/LWOUkp1844278)   04/01/2021 55 (L) >60 mL/min/[1.73_m2] Final     Comment:     Non  GFR Calc  Starting 12/18/2018, serum creatinine based estimated GFR (eGFR) will be   calculated using the Chronic Kidney Disease Epidemiology Collaboration   (CKD-EPI) equation.     11/22/2019 52 (L) >60 mL/min/[1.73_m2] Final     Comment:     Non  GFR Calc  Starting 12/18/2018, serum creatinine based estimated GFR (eGFR) will be   calculated using the Chronic Kidney Disease Epidemiology Collaboration   (CKD-EPI) equation.     11/20/2018 54 (L) >60 mL/min/1.7m2 Final     Comment:     Non  GFR Calc     - Albumin Random Urine Quantitative with Creat Ratio; Future  - CBC with platelets and differential; Future  - Albumin Random Urine Quantitative with Creat Ratio  - CBC with platelets and differential    Impaired fasting glucose  Stable, recheck annually  - Hemoglobin A1c; Future  - Comprehensive metabolic panel (BMP + Alb, Alk Phos, ALT, AST, Total. Bili,  "TP)  - Hemoglobin A1c    Psoriasis  Acute flare due to stress, see order below  - hydrOXYzine HCl (ATARAX) 25 MG tablet; Take 1/2-2 tabs po HS prn itching    Visit for screening mammogram  - MA SCREENING DIGITAL BILAT - Future  (s+30); Future    Flu and covid vaccines offered, recommended, patient declined today.   Patient has been advised of split billing requirements and indicates understanding: Yes      Counseling  Reviewed preventive health counseling, as reflected in patient instructions      BMI  Estimated body mass index is 32.52 kg/m  as calculated from the following:    Height as of this encounter: 1.638 m (5' 4.5\").    Weight as of this encounter: 87.3 kg (192 lb 6.4 oz).   Weight management plan: see AVS, continue healthy lifestyle      She reports that she quit smoking about 32 years ago. Her smoking use included cigarettes. She started smoking about 37 years ago. She has never used smokeless tobacco.          Signed Electronically by: Monica Lemus MD  Answers submitted by the patient for this visit:  Annual Preventive Visit (Submitted on 1/16/2024)  Chief Complaint: Annual Exam:  Blood in stool: No  heartburn: No  peripheral edema: No  mood changes: No  Skin sensation changes: No  tenderness: No  breast mass: No  breast discharge: No    "

## 2024-01-18 NOTE — RESULT ENCOUNTER NOTE
Patient was seen today in clinic.  I discussed results in clinic, please see clinic progress note.    Monica Lemus MD 1/18/2024

## 2024-01-18 NOTE — PATIENT INSTRUCTIONS
Preventive Health Recommendations  Female Ages 50 - 64    Yearly exam: See your health care provider every year in order to  Review health changes.   Discuss preventive care.    Review your medicines if your doctor has prescribed any.    Get a Pap test with HPV screening every 5 years.   Get a mammogram yearly. You can schedule this through [employer] or call [facility]  Appointment line [phone]  2   [facility] [place]   Appointment line [phone]   3.   [facility]   Appointment line [phone]    Have a colonoscopy or home test for colon cancer starting age 45.    Have a cholesterol test every 5 years, or more often if advised.  Have a diabetes test (fasting glucose) every three years. If you are at risk for diabetes, you should have this test more often.   If you are at risk for osteoporosis (brittle bone disease), think about having a bone density scan (DEXA).    Shots: Get a flu shot each year. Get a tetanus shot every 10 years.    Nutrition:   Eat at least 5 servings of fruits and vegetables each day.  Eat whole-grain bread, whole-wheat pasta and brown rice instead of white grains and rice.  Get adequate Calcium and Vitamin D.     Lifestyle  Exercise at least 150 minutes a week (30 minutes a day, 5 days a week). This will help you control your weight and prevent disease.  Limit alcohol to one drink per day.  No smoking.   Wear sunscreen to prevent skin cancer.   See your dentist every six months for an exam and cleaning.  See your eye doctor every 1 to 2 years.

## 2024-01-19 LAB
ALBUMIN SERPL BCG-MCNC: 4.4 G/DL (ref 3.5–5.2)
ALP SERPL-CCNC: 53 U/L (ref 40–150)
ALT SERPL W P-5'-P-CCNC: 33 U/L (ref 0–50)
ANION GAP SERPL CALCULATED.3IONS-SCNC: 10 MMOL/L (ref 7–15)
AST SERPL W P-5'-P-CCNC: 22 U/L (ref 0–45)
BILIRUB SERPL-MCNC: 0.8 MG/DL
BUN SERPL-MCNC: 14.3 MG/DL (ref 8–23)
CALCIUM SERPL-MCNC: 9.4 MG/DL (ref 8.8–10.2)
CHLORIDE SERPL-SCNC: 103 MMOL/L (ref 98–107)
CHOLEST SERPL-MCNC: 277 MG/DL
CREAT SERPL-MCNC: 1.04 MG/DL (ref 0.51–0.95)
CREAT UR-MCNC: 24.8 MG/DL
DEPRECATED HCO3 PLAS-SCNC: 26 MMOL/L (ref 22–29)
EGFRCR SERPLBLD CKD-EPI 2021: 60 ML/MIN/1.73M2
FASTING STATUS PATIENT QL REPORTED: YES
GLUCOSE SERPL-MCNC: 111 MG/DL (ref 70–99)
HDLC SERPL-MCNC: 46 MG/DL
LDLC SERPL CALC-MCNC: 198 MG/DL
MICROALBUMIN UR-MCNC: <12 MG/L
MICROALBUMIN/CREAT UR: NORMAL MG/G{CREAT}
NONHDLC SERPL-MCNC: 231 MG/DL
POTASSIUM SERPL-SCNC: 4 MMOL/L (ref 3.4–5.3)
PROT SERPL-MCNC: 6.5 G/DL (ref 6.4–8.3)
SODIUM SERPL-SCNC: 139 MMOL/L (ref 135–145)
TRIGL SERPL-MCNC: 167 MG/DL

## 2024-01-30 DIAGNOSIS — E78.5 HYPERLIPIDEMIA LDL GOAL <130: Primary | ICD-10-CM

## 2024-01-30 NOTE — RESULT ENCOUNTER NOTE
Thank you very much for getting labs done!      Thank you for getting your cholesterol checked!  Your total cholesterol, triglycerides and LDL cholesterol are elevated, and higher than last year.  Your HDL is too low although this is also higher than last year, which is a good thing. The HDL protects your heart, so the higher the better.    Desired or goal levels are:  CHOLESTEROL: Desirable is less than 200.  HDL (Good Cholesterol): Desirable is greater than 40 in men and greater than 50 in women.  LDL (Bad Cholesterol): Desirable is less than 130  TRIGLYCERIDES: Desirable is less than 150.    Please follow the recommendations below and schedule a lab only appointment ([phone]) in 2-3 months for a repeat fasting test. Please don't have anything to eat or drink (except water) for 8 hours prior to the test.    To keep triglycerides in check,  reduce carbohydrates/sugar in your diet by reducing portion size of carbohydrates including sweets, juice, alcohol, white bread, crackers, pasta, rice, potatoes and cereal.     You can also reduce your triglycerides by increasing your activity level. Exercise for at least 30 min 5 times per week, and lift weights 2-3 times per week to build muscle mass and improve your metabolism.    Intermittent fasting, allowing at least 12 hours between dinner and breakfast, or even 16 hours or more can also help lower your total and LDL cholesterol as well as your triglycerides. The goal is to try to push all your daily calories into a window between say 8 am and 6 pm, and really try to reduce calories consumed in the afternoon and evening, aiming for not eating at all right before bed.    .  Also consider starting or increasing your aerobic activity; this is the best way to improve HDL (good) cholesterol. Exercise for at least 30 min 5 times per week, and lift weights 2-3 times per week.    As you may know, abnormal cholesterol is one factor that increases your risk for heart disease and  stroke. You can improve your cholesterol by controlling the amount and type of fat you eat and by increasing your daily activity level.    Here are some ways to improve your nutrition (adapted from the American Academy of [employer] handout):  Eat less fat (especially butter, Crisco and other saturated fats)  Buy lean cuts of meat; reduce your portions of red meat or substitute poultry or fish, or avoid meat altogether.  Use skim milk and low-fat dairy products  Eat no more than 4 egg yolks per week  Avoid fried or fast foods that are high in fat  Eat more fruits and vegetables, trying to make your plate of food at least half non-starchy vegetables.    Your risk of a heart attack or stroke in the next 10 years  is elevated because of your high cholesterol, with a 10 year risk of almost 9%. You could consider starting a cholesterol lowering medication. Please let me know if you'd like to discuss this.    The testing of your blood sugar, liver function and electrolytes was normal.  Kidney function is assessed by blood work that measures creatinine and calculates estimated GFR (glomerular filtration rate).  By current criteria you do have stage 2 chronic kidney disease as your GFR is 60.  This is not something that is urgent as your value has not changed much with time.  Your microalbumen is normal, which is great news. This means you do not have protein in the urine, and that your kidneys are currently functioning well. Keeping blood pressure and blood fats low is the best way to preserve your kidney function over your lifetime.     However, it does mean will monitor your labs (urine and blood tests) every 6-12 months and we will keep trying to make sure your blood pressure and cholesterol are at goal to keep your kidneys as healthy as possible.      Thank you for getting labs done. Your lab test to check for diabetes, HgA1C, also called glycosylated hemoglobin, which measures the level of sugar in your blood  over the past few months, is slightly higher than normal but less than 6.5. This is good news, it means you don't have diabetes right now and it has not changed (has actually improved) over the past 4 years.  However, it does mean that you're at risk for developing diabetes in the future so we'll keep checking this lab every year. Many of the diet and exercise changes that help lower your cholesterol will also help lower your blood sugars.     If you have any questions, please contact the clinic or schedule an appointment with me, thank you!      Sincerely,  Dr. Monica Lemus MD  1/30/2024

## 2024-03-07 DIAGNOSIS — I10 ESSENTIAL HYPERTENSION WITH GOAL BLOOD PRESSURE LESS THAN 140/90: ICD-10-CM

## 2024-03-07 DIAGNOSIS — N18.31 STAGE 3A CHRONIC KIDNEY DISEASE (H): ICD-10-CM

## 2024-03-07 RX ORDER — LISINOPRIL 10 MG/1
10 TABLET ORAL 2 TIMES DAILY
Qty: 180 TABLET | Refills: 3 | Status: SHIPPED | OUTPATIENT
Start: 2024-03-07

## 2024-03-16 ENCOUNTER — HEALTH MAINTENANCE LETTER (OUTPATIENT)
Age: 64
End: 2024-03-16

## 2024-06-13 ENCOUNTER — PATIENT OUTREACH (OUTPATIENT)
Dept: CARE COORDINATION | Facility: CLINIC | Age: 64
End: 2024-06-13
Payer: COMMERCIAL

## 2024-06-26 ENCOUNTER — ANCILLARY PROCEDURE (OUTPATIENT)
Dept: MAMMOGRAPHY | Facility: CLINIC | Age: 64
End: 2024-06-26
Attending: FAMILY MEDICINE
Payer: COMMERCIAL

## 2024-06-26 DIAGNOSIS — Z12.31 VISIT FOR SCREENING MAMMOGRAM: ICD-10-CM

## 2024-06-26 PROCEDURE — 77067 SCR MAMMO BI INCL CAD: CPT | Mod: GC | Performed by: RADIOLOGY

## 2024-06-26 PROCEDURE — 77063 BREAST TOMOSYNTHESIS BI: CPT | Mod: GC | Performed by: RADIOLOGY

## 2024-08-26 ENCOUNTER — PATIENT OUTREACH (OUTPATIENT)
Dept: GASTROENTEROLOGY | Facility: CLINIC | Age: 64
End: 2024-08-26
Payer: COMMERCIAL

## 2024-08-26 DIAGNOSIS — Z12.11 SPECIAL SCREENING FOR MALIGNANT NEOPLASMS, COLON: Primary | ICD-10-CM

## 2024-08-26 NOTE — PROGRESS NOTES
"CRC Screening Colonoscopy Referral Review    Patient meets the inclusion criteria for screening colonoscopy standing order.    Ordering/Referring Provider:  Monica Lemus      BMI: Estimated body mass index is 32.52 kg/m  as calculated from the following:    Height as of 1/18/24: 1.638 m (5' 4.5\").    Weight as of 1/18/24: 87.3 kg (192 lb 6.4 oz).     Sedation:  Does patient have any of the following conditions affecting sedation?  Hx of Poor Sedation: MAC sedation recommended    Previous Scopes:  Any previous recommendations or follow up needs based on previous scope?  na / No recommendations.    Medical Concerns to Postpone Order:  Does patient have any of the following medical concerns that should postpone/delay colonoscopy referral?  No medical conditions affecting colonoscopy referral.    Final Referral Details:  Based on patient's medical history patient is appropriate for referral order with MAC/deep sedation.   BMI<= 45 45 < BMI <= 48 48 < BMI < = 50  BMI > 50   No Restrictions No [facility]  [facility]  [facility] with exceptions Hospital Only OR Only     "

## 2024-10-24 ENCOUNTER — TELEPHONE (OUTPATIENT)
Dept: GASTROENTEROLOGY | Facility: CLINIC | Age: 64
End: 2024-10-24
Payer: COMMERCIAL

## 2024-10-24 NOTE — TELEPHONE ENCOUNTER
"Endoscopy Scheduling Screen    Have you had any respiratory illness or flu-like symptoms in the last 10 days?  No    What is your communication preference for Instructions and/or Bowel Prep?   MyChart    What insurance is in the chart?  Other:  [facility]    Ordering/Referring Provider:   JOHNY GARRIDO        (If ordering provider performs procedure, schedule with ordering provider unless otherwise instructed. )    BMI: Estimated body mass index is 32.52 kg/m  as calculated from the following:    Height as of 1/18/24: 1.638 m (5' 4.5\").    Weight as of 1/18/24: 87.3 kg (192 lb 6.4 oz).     Sedation Ordered  MAC/deep sedation.   BMI<= 45 45 < BMI <= 48 48 < BMI < = 50  BMI > 50   No Restrictions No [facility]  No [facility]  [facility] with exceptions Hospital Only OR Only       Do you have a history of malignant hyperthermia?  No    (Females) Are you currently pregnant?   No     Have you been diagnosed or told you have pulmonary hypertension?   No    Do you have an LVAD?  No    Have you been told you have moderate to severe sleep apnea?  No.    Have you been told you have COPD, asthma, or any other lung disease?  No    Do you have any heart conditions?  No     Have you ever had or are you waiting for an organ transplant?  No. Continue scheduling, no site restrictions.    Have you had a stroke or transient ischemic attack (TIA aka \"mini stroke\" in the last 6 months?   No    Have you been diagnosed with or been told you have cirrhosis of the liver?   No.    Are you currently on dialysis?   No    Do you need assistance transferring?   No    BMI: Estimated body mass index is 32.52 kg/m  as calculated from the following:    Height as of 1/18/24: 1.638 m (5' 4.5\").    Weight as of 1/18/24: 87.3 kg (192 lb 6.4 oz).     Is patients BMI > 40 and scheduling location [facility]?  No    Do you take an injectable or oral medication for weight loss or diabetes (excluding insulin)?  No    Do you take the medication Naltrexone?  No    Do you " take blood thinners?  No       Prep   Are you currently on dialysis or do you have chronic kidney disease?  Yes (Golytely Prep)    Do you have a diagnosis of diabetes?  No    Do you have a diagnosis of cystic fibrosis (CF)?  No    On a regular basis do you go 3 -5 days between bowel movements?  No    BMI > 40?  No    Preferred Pharmacy:        [id number] - [place], [place] - [address] AT [facility] [address]  [address]  [place] [address]  Phone: [phone] Fax: [phone]      Final Scheduling Details     Procedure scheduled  Colonoscopy    Surgeon:  Chelsie    Date of procedure:  3/4/25     Pre-OP / PAC:   No - Not required for this site.    Location  [facility] - Patient preference.    Sedation   MAC/Deep Sedation - Per order.      Patient Reminders:   You will receive a call from a Nurse to review instructions and health history.  This assessment must be completed prior to your procedure.  Failure to complete the Nurse assessment may result in the procedure being cancelled.      On the day of your procedure, please designate an adult(s) who can drive you home stay with you for the next 24 hours. The medicines used in the exam will make you sleepy. You will not be able to drive.      You cannot take public transportation, ride share services, or non-medical taxi service without a responsible caregiver.  Medical transport services are allowed with the requirement that a responsible caregiver will receive you at your destination.  We require that drivers and caregivers are confirmed prior to your procedure.

## 2025-01-25 ENCOUNTER — HEALTH MAINTENANCE LETTER (OUTPATIENT)
Age: 65
End: 2025-01-25

## 2025-02-19 ENCOUNTER — TELEPHONE (OUTPATIENT)
Dept: GASTROENTEROLOGY | Facility: CLINIC | Age: 65
End: 2025-02-19
Payer: COMMERCIAL

## 2025-02-19 DIAGNOSIS — Z12.11 SPECIAL SCREENING FOR MALIGNANT NEOPLASMS, COLON: Primary | ICD-10-CM

## 2025-02-19 RX ORDER — BISACODYL 5 MG/1
TABLET, DELAYED RELEASE ORAL
Qty: 4 TABLET | Refills: 0 | Status: SHIPPED | OUTPATIENT
Start: 2025-02-19

## 2025-02-19 NOTE — TELEPHONE ENCOUNTER
Pre visit planning completed.      Procedure details:    Patient scheduled for Colonoscopy on 3/4/25.     Arrival time: 1015. Procedure time 1115    Facility location: [facility]; [address] [place] [address], 5th Floor, [place] [address]. Check in location: 5th Floor.    Sedation type: MAC    Pre op exam needed? No.    Indication for procedure: screening colonoscopy       Chart review:     Electronic implanted devices? No    Recent diagnosis of diverticulitis within the last 6 weeks? No      Medication review:    Diabetic? No    Anticoagulants? No    Weight loss medication/injectable? No GLP-1 medication per patient's medication list. Nursing to verify with pre-assessment call.    Other medication HOLDING recommendations:  N/A      Prep for procedure:     Bowel prep recommendation: Standard Golytely. Bowel prep sent to    [id number] - [place], [place] - [address] AT [facility] [address]  Due to: CKD noted    Procedure information and instructions sent via [email or web address]         Dora Lai RN  Endoscopy Procedure Pre Assessment   [phone] option 3

## 2025-02-19 NOTE — TELEPHONE ENCOUNTER
Pre assessment completed for upcoming procedure.   (Please see previous telephone encounter notes for complete details)    Patient returned call.       Procedure details:    Arrival time and facility location reviewed.    Pre op exam needed? No.    Designated  policy reviewed. Instructed to have someone stay 24  hours post procedure.       Medication review:    Medications reviewed. Please see supporting documentation below. Holding recommendations discussed (if applicable).       Prep for procedure:     Procedure prep instructions reviewed.        Any additional information needed:  N/A      Patient verbalized understanding and had no questions or concerns at this time.      Corinne Kliber, RN  Endoscopy Procedure Pre Assessment   [phone] option 3

## 2025-02-19 NOTE — TELEPHONE ENCOUNTER
Attempted to contact patient in order to complete pre assessment questions.     No answer. Left message to return call to [phone] option 3    Callback communication sent via [employer].      Aurora Del Rio LPN  Endoscopy Procedure Pre Assessment

## 2025-03-02 ENCOUNTER — OFFICE VISIT (OUTPATIENT)
Dept: URGENT CARE | Facility: URGENT CARE | Age: 65
End: 2025-03-02
Payer: COMMERCIAL

## 2025-03-02 VITALS
WEIGHT: 185 LBS | SYSTOLIC BLOOD PRESSURE: 138 MMHG | HEIGHT: 65 IN | OXYGEN SATURATION: 100 % | BODY MASS INDEX: 30.82 KG/M2 | DIASTOLIC BLOOD PRESSURE: 94 MMHG | TEMPERATURE: 97.9 F | RESPIRATION RATE: 18 BRPM | HEART RATE: 100 BPM

## 2025-03-02 DIAGNOSIS — R30.0 DYSURIA: ICD-10-CM

## 2025-03-02 DIAGNOSIS — R31.9 URINARY TRACT INFECTION WITH HEMATURIA, SITE UNSPECIFIED: Primary | ICD-10-CM

## 2025-03-02 DIAGNOSIS — N18.31 CHRONIC KIDNEY DISEASE, STAGE 3A (H): ICD-10-CM

## 2025-03-02 DIAGNOSIS — N39.0 URINARY TRACT INFECTION WITH HEMATURIA, SITE UNSPECIFIED: Primary | ICD-10-CM

## 2025-03-02 LAB
ALBUMIN UR-MCNC: 30 MG/DL
APPEARANCE UR: ABNORMAL
BACTERIA #/AREA URNS HPF: ABNORMAL /HPF
BILIRUB UR QL STRIP: NEGATIVE
COLOR UR AUTO: YELLOW
GLUCOSE UR STRIP-MCNC: NEGATIVE MG/DL
HGB UR QL STRIP: ABNORMAL
KETONES UR STRIP-MCNC: NEGATIVE MG/DL
LEUKOCYTE ESTERASE UR QL STRIP: ABNORMAL
NITRATE UR QL: NEGATIVE
PH UR STRIP: 5.5 [PH] (ref 5–7)
RBC #/AREA URNS AUTO: ABNORMAL /HPF
SP GR UR STRIP: 1.01 (ref 1–1.03)
SQUAMOUS #/AREA URNS AUTO: ABNORMAL /LPF
UROBILINOGEN UR STRIP-ACNC: 0.2 E.U./DL
WBC #/AREA URNS AUTO: ABNORMAL /HPF
WBC CLUMPS #/AREA URNS HPF: PRESENT /HPF

## 2025-03-02 PROCEDURE — 81001 URINALYSIS AUTO W/SCOPE: CPT | Performed by: PHYSICIAN ASSISTANT

## 2025-03-02 PROCEDURE — 3075F SYST BP GE 130 - 139MM HG: CPT | Performed by: PHYSICIAN ASSISTANT

## 2025-03-02 PROCEDURE — 87086 URINE CULTURE/COLONY COUNT: CPT | Performed by: PHYSICIAN ASSISTANT

## 2025-03-02 PROCEDURE — 3080F DIAST BP >= 90 MM HG: CPT | Performed by: PHYSICIAN ASSISTANT

## 2025-03-02 PROCEDURE — 99214 OFFICE O/P EST MOD 30 MIN: CPT | Performed by: PHYSICIAN ASSISTANT

## 2025-03-02 RX ORDER — CEFADROXIL 500 MG/1
500 CAPSULE ORAL 2 TIMES DAILY
Qty: 14 CAPSULE | Refills: 0 | Status: SHIPPED | OUTPATIENT
Start: 2025-03-02 | End: 2025-03-09

## 2025-03-02 NOTE — PROGRESS NOTES
Chief Complaint   Patient presents with    Urgent Care     Concern of UTI, sick for a month on and off. Having flank pain, compromised kidney. Doesn't get typical symptoms for UTI.        Assessment & Plan  Chronic kidney disease, stage 3a (H):  - Chronic kidney disease acknowledged with stable creatinine levels over 15 years.  - Monitor kidney function; no immediate labs required as patient will see PCP later this month.    Urinary tract infection with hematuria, site unspecified:  - Urinary tract infection confirmed with potential kidney involvement due to symptoms like back pain, nausea, and vomiting.  - Prescribe Duricef, an antibiotic effective for potential kidney involvement. Culture bacteria to ensure antibiotic effectiveness.  Call with ordering provider/GI postponing colonoscopy  - Risks and side effects: Possible gut-related side effects such as loose stools and rashes from antibiotics, but typically well tolerated.     ICD-10-CM    1. Urinary tract infection with hematuria, site unspecified  N39.0 cefadroxil (DURICEF) 500 MG capsule    R31.9       2. Dysuria  R30.0 UA Macroscopic with reflex to Microscopic and Culture - Clinic Collect     Urine Microscopic Exam     Urine Culture      3. Chronic kidney disease, stage 3a (H)  N18.31           SUBJECTIVE:  History of Present Illness-Franchesca Hyatt, a 64-year-old female, has been experiencing intermittent illness for about a month, initially suspecting the flu. She reported vomiting on Wednesday, which she attributed to a possible recurrence of the flu. She has been under significant stress and noted that her symptoms seemed to settle in her stomach, followed by back pain. She has a history of compromised kidneys and previously had a kidney stone about 15 years ago, which damaged her kidney. She has not experienced pain during urination, a symptom she never presented with even when younger. Her symptoms include low back pain, nausea, and vomiting, raising  "concerns about potential kidney involvement. She mentioned that her creatinine levels have remained stable over the past 15 years. She also has a colonoscopy scheduled in 3 days  ROS: Pertinent ROS neg other than the symptoms noted above in the HPI.     OBJECTIVE:  BP (!) 138/94   Pulse 100   Temp 97.9  F (36.6  C) (Temporal)   Resp 18   Ht 1.651 m (5' 5\")   Wt 83.9 kg (185 lb)   LMP 06/08/2014   SpO2 100%   BMI 30.79 kg/m        GENERAL: alert and no distress    DIAGNOSTICS    Results- Urinalysis: Presence of white blood cells indicating a urinary tract infection.  Results for orders placed or performed in visit on 03/02/25   UA Macroscopic with reflex to Microscopic and Culture - Clinic Collect     Status: Abnormal    Specimen: Urine, Midstream   Result Value Ref Range    Color Urine Yellow Colorless, Straw, Light Yellow, Yellow    Appearance Urine Slightly Cloudy (A) Clear    Glucose Urine Negative Negative mg/dL    Bilirubin Urine Negative Negative    Ketones Urine Negative Negative mg/dL    Specific Gravity Urine 1.015 1.003 - 1.035    Blood Urine Moderate (A) Negative    pH Urine 5.5 5.0 - 7.0    Protein Albumin Urine 30 (A) Negative mg/dL    Urobilinogen Urine 0.2 0.2, 1.0 E.U./dL    Nitrite Urine Negative Negative    Leukocyte Esterase Urine Large (A) Negative   Urine Microscopic Exam     Status: Abnormal   Result Value Ref Range    Bacteria Urine Few (A) None Seen /HPF    RBC Urine 5-10 (A) 0-2 /HPF /HPF    WBC Urine 25-50 (A) 0-5 /HPF /HPF    Squamous Epithelials Urine Few (A) None Seen /LPF    WBC Clumps Urine Present (A) None Seen /HPF        Óscar Mccall PA-C    Consent was obtained from the patient to use an AI documentation tool in the creation of this note.  Any grammatical or spelling errors are non-intentional. Please contact the author of this note directly if you are in need of any clarification.     Current Outpatient Medications   Medication Sig Dispense Refill    ACIDOPHILUS OR " CAPS 2 caps. daily  0    Ascorbic Acid (VITAMIN C PO)       bisacodyl (DULCOLAX) 5 MG EC tablet Take 2 tablets at 3 pm the day before your procedure. If your procedure is before 11 am, take 2 additional tablets at 11 pm. If your procedure is after 11 am, take 2 additional tablets at 6 am. For additional instructions refer to your colonoscopy prep instructions. 4 tablet 0    cefadroxil (DURICEF) 500 MG capsule Take 1 capsule (500 mg) by mouth 2 times daily for 7 days. 14 capsule 0    Flaxseed, Linseed, (FLAX SEED OIL PO) Take by mouth daily      lisinopril (ZESTRIL) 10 MG tablet TAKE 1 TABLET(10 MG) BY MOUTH TWICE DAILY 180 tablet 3    MAGNESIUM 300 MG OR CAPS At Bedtime       Multiple Vitamins-Minerals (ZINC PO) Take by mouth daily ZINC       Nettle, Urtica Dioica, (NETTLE LEAF PO)       polyethylene glycol (GOLYTELY) 236 g suspension The night before the exam at 6 pm drink an 8-ounce glass every 15 minutes until the jug is half empty. If you arrive before 11 AM: Drink the other half of the Golytely jug at 11 PM night before procedure. If you arrive after 11 AM: Drink the other half of the Golytely jug at 6 AM day of procedure. For additional instructions refer to your colonoscopy prep instructions. 4000 mL 0    VITAMIN D OR 1 cap daily in the winter      Cyanocobalamin (VITAMIN B 12 PO) Take by mouth daily       DIGEST ENZYMES-ANTICHOLINERGIC OR CAPS       hydrOXYzine HCl (ATARAX) 25 MG tablet Take 1/2-2 tabs po HS prn itching 30 tablet 1    order for DME Equipment being ordered: SAD lite box 1 each 0     No current facility-administered medications for this visit.      Patient Active Problem List   Diagnosis    Family history of colonic polyps    Chronic kidney disease, stage 3a (H)    Tubular adenoma of colon    Impaired fasting glucose    Essential hypertension with goal blood pressure less than 140/90    Elevated bilirubin    Seasonal affective disorder    Abnormal weight gain    Obesity, Class I, BMI 30-34.9     Hypercholesteremia    Hyperlipidemia LDL goal <130    Blood type A+    Elevated blood pressure reading without diagnosis of hypertension    ASCUS of cervix with negative high risk HPV    Fx humeral neck, left, closed, initial encounter    Other closed displaced fracture of proximal end of left humerus, initial encounter    Chest pain, unspecified type      Past Medical History:   Diagnosis Date    Calculus of kidney     Calculus, kidney 2007    CKD (chronic kidney disease) stage 3, GFR 30-59 ml/min (H)     Hypercholesteremia 11/29/2018    Hypertension goal BP (blood pressure) < 140/90 11/5/2012    Seasonal allergic rhinitis     seasonal allergies, cats    Tubular adenoma of colon 10/7/2014    Colonoscopy 10/1/2014, repeat 2019    Urinary tract infection, site not specified 1980 to early '90's    Past history of      Past Surgical History:   Procedure Laterality Date    BIOPSY BREAST      X 2    COLONOSCOPY  6/15/2009    Polyp - Due in 5 years    CYSTOSCOPY, RETROGRADES, MANIPULATE STONE, INSERT STENT, COMBINED  2007    EXCISE PILONIDAL CYST, EXTENSIVE      LASER REVOLIX LITHOTRIPSY URETER(S), INSERT STENT, COMBINED  2007    Sebaceous Cyst Removal  2008    Back     Family History   Problem Relation Age of Onset    Hypertension Mother     Cerebrovascular Disease Mother         at age 50    Allergies Mother     Arthritis Mother         osteo    Depression Mother         Treated holistically    Cancer - colorectal Mother         BENIGN polyps 68 (2007)    Hypertension Father     Allergies Father     Depression Father         Took medication    Cerebrovascular Disease Father     Heart Disease Father         had heart surgery and a stroke     Cancer - colorectal Maternal Grandmother         at age 50    Alzheimer Disease Maternal Grandmother         diag at age 80    Eye Disorder Maternal Grandmother         cateracts    Osteoporosis Maternal Grandmother     Cerebrovascular Disease Maternal Grandmother     Colon Cancer  Maternal Grandmother     Cancer Maternal Grandfather         smoker    Cancer Paternal Grandfather         smoker    Hypertension Brother     Depression Sister         Holistically    Gynecology Sister         cervical cancer at age 30    Cancer Sister         cervical     Social History     Tobacco Use    Smoking status: Former     Current packs/day: 0.00     Types: Cigarettes     Start date: 1986     Quit date: 3/12/1991     Years since quittin.9    Smokeless tobacco: Never    Tobacco comments:     Was a light smoker for a few years in my youth.   Substance Use Topics    Alcohol use: Yes     Comment: rare - one glass per week of wine

## 2025-03-03 ENCOUNTER — TELEPHONE (OUTPATIENT)
Dept: GASTROENTEROLOGY | Facility: CLINIC | Age: 65
End: 2025-03-03
Payer: COMMERCIAL

## 2025-03-03 LAB — BACTERIA UR CULT: NORMAL

## 2025-03-09 ENCOUNTER — OFFICE VISIT (OUTPATIENT)
Dept: URGENT CARE | Facility: URGENT CARE | Age: 65
End: 2025-03-09
Payer: COMMERCIAL

## 2025-03-09 VITALS
HEIGHT: 65 IN | SYSTOLIC BLOOD PRESSURE: 140 MMHG | TEMPERATURE: 98.2 F | BODY MASS INDEX: 30.82 KG/M2 | RESPIRATION RATE: 20 BRPM | OXYGEN SATURATION: 97 % | DIASTOLIC BLOOD PRESSURE: 90 MMHG | HEART RATE: 101 BPM | WEIGHT: 185 LBS

## 2025-03-09 DIAGNOSIS — M54.50 BILATERAL LOW BACK PAIN, UNSPECIFIED CHRONICITY, UNSPECIFIED WHETHER SCIATICA PRESENT: Primary | ICD-10-CM

## 2025-03-09 LAB
ALBUMIN UR-MCNC: 100 MG/DL
APPEARANCE UR: CLEAR
BACTERIA #/AREA URNS HPF: ABNORMAL /HPF
BILIRUB UR QL STRIP: NEGATIVE
COLOR UR AUTO: YELLOW
GLUCOSE UR STRIP-MCNC: NEGATIVE MG/DL
HGB UR QL STRIP: ABNORMAL
KETONES UR STRIP-MCNC: NEGATIVE MG/DL
LEUKOCYTE ESTERASE UR QL STRIP: ABNORMAL
NITRATE UR QL: NEGATIVE
PH UR STRIP: 7 [PH] (ref 5–7)
RBC #/AREA URNS AUTO: ABNORMAL /HPF
SP GR UR STRIP: 1.02 (ref 1–1.03)
SQUAMOUS #/AREA URNS AUTO: ABNORMAL /LPF
UROBILINOGEN UR STRIP-ACNC: 0.2 E.U./DL
WBC #/AREA URNS AUTO: ABNORMAL /HPF

## 2025-03-09 PROCEDURE — 3080F DIAST BP >= 90 MM HG: CPT | Performed by: FAMILY MEDICINE

## 2025-03-09 PROCEDURE — 3077F SYST BP >= 140 MM HG: CPT | Performed by: FAMILY MEDICINE

## 2025-03-09 PROCEDURE — 81001 URINALYSIS AUTO W/SCOPE: CPT

## 2025-03-09 PROCEDURE — 87086 URINE CULTURE/COLONY COUNT: CPT | Performed by: FAMILY MEDICINE

## 2025-03-09 PROCEDURE — 1125F AMNT PAIN NOTED PAIN PRSNT: CPT | Performed by: FAMILY MEDICINE

## 2025-03-09 PROCEDURE — 99213 OFFICE O/P EST LOW 20 MIN: CPT | Performed by: FAMILY MEDICINE

## 2025-03-09 ASSESSMENT — PAIN SCALES - GENERAL: PAINLEVEL_OUTOF10: MODERATE PAIN (4)

## 2025-03-09 NOTE — PROGRESS NOTES
"  Assessment & Plan     Bilateral low back pain, unspecified chronicity, unspecified whether sciatica present  61-year-old male presented with low back pain, stomach upset.  History of chronic kidney disease and nephrolithiasis.  Patient was treated for suspected UTI, urine culture was unremarkable.  Presentation not quite classical for urinary tract infection.  Differential discussed in detail including musculoskeletal etiology.  UA findings and previous urine cultures reviewed.  Shared decision made to continue conservative management for now.  Will wait for urine culture result before starting another round of antibiotic.  Recommended well hydration, Tylenol and to follow-up with primary care provider for further evaluation.  Patient understood and in agreement with the above plan.  All questions answered.  - UA Macroscopic with reflex to Microscopic and Culture - Clinic Collect  - Urine Microscopic Exam  - Urine Culture      Wang Sorensen is a 64 year old, presenting for the following health issues:  Urgent Care and Urgency (Pt in clinic to have eval for urinary urgency./)    HPI        Concern -   Onset: 10 days   Description: low back pain and upset stomach  Intensity: moderate  Progression of Symptoms:  same  Accompanying Signs & Symptoms: no fever, chills, dysuria, hematuria, frequency or other relevant systemic symptoms   Previous history of similar problem:   Therapies tried and outcome: Cefadrxil   As per patient, was told last week that urine culture was not done.  On reviewing patient chart, urine culture was unremarkable.      Review of Systems  Constitutional, HEENT, cardiovascular, pulmonary, gi and gu systems are negative, except as otherwise noted.      Objective    BP (!) 140/90   Pulse 101   Temp 98.2  F (36.8  C) (Temporal)   Resp 20   Ht 1.651 m (5' 5\")   Wt 83.9 kg (185 lb)   LMP 06/08/2014   SpO2 97%   BMI 30.79 kg/m    Body mass index is 30.79 kg/m .  Physical Exam   GENERAL: " alert and no distress  EYES: Eyes grossly normal to inspection, PERRL and conjunctivae and sclerae normal  HENT: normal cephalic/atraumatic, nose and mouth without ulcers or lesions, oropharynx clear, and oral mucous membranes moist  NECK: no adenopathy, no asymmetry, masses, or scars  RESP: lungs clear to auscultation - no rales, rhonchi or wheezes  CV: regular rates and rhythm, normal S1 S2, no S3 or S4, and no murmur, click or rub  ABDOMEN: soft, nontender and no organomegaly or masses  MS: no spinal/paraspinal tenderness, skin discoloration or swelling noted, normal lower extremity strength, normal gait  PSYCH: Anxious, bit stressed appearing, normal mentation, well-groomed    Results for orders placed or performed in visit on 03/09/25   UA Macroscopic with reflex to Microscopic and Culture - Clinic Collect     Status: Abnormal    Specimen: Urine, Clean Catch   Result Value Ref Range    Color Urine Yellow Colorless, Straw, Light Yellow, Yellow    Appearance Urine Clear Clear    Glucose Urine Negative Negative mg/dL    Bilirubin Urine Negative Negative    Ketones Urine Negative Negative mg/dL    Specific Gravity Urine 1.020 1.003 - 1.035    Blood Urine Small (A) Negative    pH Urine 7.0 5.0 - 7.0    Protein Albumin Urine 100 (A) Negative mg/dL    Urobilinogen Urine 0.2 0.2, 1.0 E.U./dL    Nitrite Urine Negative Negative    Leukocyte Esterase Urine Moderate (A) Negative   Urine Microscopic Exam     Status: Abnormal   Result Value Ref Range    Bacteria Urine Moderate (A) None Seen /HPF    RBC Urine 5-10 (A) 0-2 /HPF /HPF    WBC Urine 25-50 (A) 0-5 /HPF /HPF    Squamous Epithelials Urine Few (A) None Seen /LPF           Signed Electronically by: Davis Lim MD

## 2025-03-10 LAB — BACTERIA UR CULT: NO GROWTH

## 2025-03-11 ENCOUNTER — DOCUMENTATION ONLY (OUTPATIENT)
Dept: FAMILY MEDICINE | Facility: CLINIC | Age: 65
End: 2025-03-11
Payer: COMMERCIAL

## 2025-03-11 NOTE — PROGRESS NOTES
Tried to call patient, no answer, left message on answering machine about urine culture result.  Suggested to follow-up with PCP as scheduled.    Dr Lim

## 2025-03-13 ENCOUNTER — MYC REFILL (OUTPATIENT)
Dept: FAMILY MEDICINE | Facility: CLINIC | Age: 65
End: 2025-03-13
Payer: COMMERCIAL

## 2025-03-13 DIAGNOSIS — I10 ESSENTIAL HYPERTENSION WITH GOAL BLOOD PRESSURE LESS THAN 140/90: ICD-10-CM

## 2025-03-13 DIAGNOSIS — N18.31 STAGE 3A CHRONIC KIDNEY DISEASE (H): ICD-10-CM

## 2025-03-13 RX ORDER — LISINOPRIL 10 MG/1
10 TABLET ORAL 2 TIMES DAILY
Qty: 180 TABLET | Refills: 3 | Status: SHIPPED | OUTPATIENT
Start: 2025-03-13

## 2025-03-18 ENCOUNTER — VIRTUAL VISIT (OUTPATIENT)
Dept: FAMILY MEDICINE | Facility: CLINIC | Age: 65
End: 2025-03-18
Payer: COMMERCIAL

## 2025-03-18 DIAGNOSIS — I10 ESSENTIAL HYPERTENSION WITH GOAL BLOOD PRESSURE LESS THAN 140/90: ICD-10-CM

## 2025-03-18 DIAGNOSIS — R73.01 IMPAIRED FASTING GLUCOSE: ICD-10-CM

## 2025-03-18 DIAGNOSIS — N18.31 STAGE 3A CHRONIC KIDNEY DISEASE (H): ICD-10-CM

## 2025-03-18 DIAGNOSIS — N20.0 NEPHROLITHIASIS: Primary | ICD-10-CM

## 2025-03-18 DIAGNOSIS — E78.5 HYPERLIPIDEMIA LDL GOAL <130: ICD-10-CM

## 2025-03-18 PROCEDURE — 98006 SYNCH AUDIO-VIDEO EST MOD 30: CPT | Performed by: FAMILY MEDICINE

## 2025-03-18 PROCEDURE — 1126F AMNT PAIN NOTED NONE PRSNT: CPT | Performed by: FAMILY MEDICINE

## 2025-03-18 RX ORDER — TAMSULOSIN HYDROCHLORIDE 0.4 MG/1
0.4 CAPSULE ORAL DAILY
Qty: 30 CAPSULE | Refills: 1 | Status: SHIPPED | OUTPATIENT
Start: 2025-03-18 | End: 2025-03-18

## 2025-03-18 ASSESSMENT — ENCOUNTER SYMPTOMS: BACK PAIN: 1

## 2025-03-18 NOTE — PATIENT INSTRUCTIONS
CT order  Please call [facility] at [phone] to schedule your CT.    Locations:   [facility], [address] [place] [address], [place] [id number]    2. Dietary recommendations; you had a calcium phosphate stone in 2007.    Limit coffee, tea, and alcohol. Also avoid grapefruit juice.  Do not take vitamins C and D right now.  Avoid antacids such as Maalox, Mylanta, or Tums.  Limit the amount of salt (sodium) in your diet.  Eat a balanced diet that is not too high in protein.

## 2025-03-18 NOTE — PROGRESS NOTES
Franchesca is a 64 year old who is being evaluated via a billable video visit.    How would you like to obtain your AVS? MyChart  If the video visit is dropped, the invitation should be resent by: Text to cell phone: [phone]  Will anyone else be joining your video visit? No      Assessment & Plan     (N20.0) Nephrolithiasis  (primary encounter diagnosis)  Comment: suspected, given symptoms with abnormla UA and negative cultures, and distant hx of calcium phosphate stones.  I recommend reducing water intake to 4-6 8 oz servings per day, avoiding citrus, salt, vitamin C and antacids. Start flomax.  Plan: tamsulosin (FLOMAX) 0.4 MG capsule, UA with         Microscopic reflex to Culture - lab collect        Consider CT if symptoms continue another 2-3 days, order placed. The patient indicates understanding of these issues and agrees with the plan.     Due for labs, ordered as below:    (I10) Essential hypertension with goal blood pressure less than 140/90  Plan: Comprehensive metabolic panel (BMP + Alb, Alk         Phos, ALT, AST, Total. Bili, TP)    (N18.31) Stage 3a chronic kidney disease (H)  Plan: Comprehensive metabolic panel (BMP + Alb, Alk         Phos, ALT, AST, Total. Bili, TP), Albumin         Random Urine Quantitative with Creat Ratio, CBC        with platelets and differential    (E78.5) Hyperlipidemia LDL goal <130  Plan: Comprehensive metabolic panel (BMP + Alb, Alk         Phos, ALT, AST, Total. Bili, TP), Lipid panel         reflex to direct LDL Fasting    (R73.01) Impaired fasting glucose  Plan: Hemoglobin A1c    Subjective   Franchesca is a 64 year old, presenting for the following health issues:  Abdominal Pain and Back Pain      3/18/2025    10:59 AM   Additional Questions   Roomed by Felicity FERGUSON     Back Pain     History of Present Illness       Reason for visit:  Follow up on labs.   She is taking medications regularly.        ED/ Followup:    Facility:  [facility]  Date of visit:  "2025  Reason for visit: Low back pain and UTI like sx  Current Status: Feeling better after this weekend, but travelling and want more input.  She's had different symptoms of illness for the past several months.  Franchesca was in [place] for 2 weeks, came home .   Her ex-  in early January, she's planning a celebration of life for  has had a lot of grief and stress.  She had sudden onset of fever 25 and 25, with muscle aches, fatigue, lung/respiratory symptoms. Symptoms lasted for 2 weeks.  She developed a stye in mid-February, 2/10/25 which is unusual for her, did resolve with home care.  25, she vomited, then started developing flank pain/low back pain. She's had UA done 3/2/25 and 3/9/25, with negative cultures.    Currently she has low back pain which may be musculoskeletal, nauseated, upset stomach. She reports she's drinking enough water.    She reports repeated UTI in her 20's, didn't present in a typical way. She had a kidney stone in , determined to be a calcium phosphate stone.    GFR Estimate   Date Value Ref Range Status   2024 60 (L) >60 mL/min/1.73m2 Final   2021 55 (L) >60 mL/min/[1.73_m2] Final     Comment:     Non  GFR Calc  Starting 2018, serum creatinine based estimated GFR (eGFR) will be   calculated using the Chronic Kidney Disease Epidemiology Collaboration   (CKD-EPI) equation.             Review of Systems  Constitutional, HEENT, cardiovascular, pulmonary, gi and gu systems are negative, except as otherwise noted.      Objective    Vitals - Patient Reported  Weight (Patient Reported): 83 kg (183 lb)  Height (Patient Reported): 165.1 cm (5' 5\")  BMI (Based on Pt Reported Ht/Wt): 30.45  Pain Score: No Pain (0)        Physical Exam   GENERAL: alert and no distress  EYES: Eyes grossly normal to inspection.  No discharge or erythema, or obvious scleral/conjunctival abnormalities.  RESP: No audible wheeze, cough, or visible " cyanosis.    SKIN: Visible skin clear. No significant rash, abnormal pigmentation or lesions.  NEURO: Cranial nerves grossly intact.  Mentation and speech appropriate for age.  PSYCH: Appropriate affect, tone, and pace of words        Video-Visit Details    Type of service:  Video Visit   Originating Location (pt. Location): Home    Distant Location (provider location):  Off-site  Platform used for Video Visit: Lupe  Signed Electronically by: Monica Lemus MD

## 2025-03-24 ENCOUNTER — LAB (OUTPATIENT)
Dept: LAB | Facility: CLINIC | Age: 65
End: 2025-03-24
Payer: COMMERCIAL

## 2025-03-24 ENCOUNTER — ANCILLARY ORDERS (OUTPATIENT)
Dept: FAMILY MEDICINE | Facility: CLINIC | Age: 65
End: 2025-03-24

## 2025-03-24 ENCOUNTER — ANCILLARY PROCEDURE (OUTPATIENT)
Dept: CT IMAGING | Facility: CLINIC | Age: 65
End: 2025-03-24
Attending: FAMILY MEDICINE
Payer: COMMERCIAL

## 2025-03-24 DIAGNOSIS — N18.31 STAGE 3A CHRONIC KIDNEY DISEASE (H): ICD-10-CM

## 2025-03-24 DIAGNOSIS — E78.5 HYPERLIPIDEMIA LDL GOAL <130: ICD-10-CM

## 2025-03-24 DIAGNOSIS — N20.0 NEPHROLITHIASIS: ICD-10-CM

## 2025-03-24 DIAGNOSIS — I10 ESSENTIAL HYPERTENSION WITH GOAL BLOOD PRESSURE LESS THAN 140/90: ICD-10-CM

## 2025-03-24 DIAGNOSIS — R73.01 IMPAIRED FASTING GLUCOSE: ICD-10-CM

## 2025-03-24 LAB
ALBUMIN SERPL BCG-MCNC: 4.7 G/DL (ref 3.5–5.2)
ALBUMIN UR-MCNC: NEGATIVE MG/DL
ALP SERPL-CCNC: 57 U/L (ref 40–150)
ALT SERPL W P-5'-P-CCNC: 20 U/L (ref 0–50)
ANION GAP SERPL CALCULATED.3IONS-SCNC: 14 MMOL/L (ref 7–15)
APPEARANCE UR: CLEAR
AST SERPL W P-5'-P-CCNC: 18 U/L (ref 0–45)
BACTERIA #/AREA URNS HPF: ABNORMAL /HPF
BASOPHILS # BLD AUTO: 0 10E3/UL (ref 0–0.2)
BASOPHILS NFR BLD AUTO: 0 %
BILIRUB SERPL-MCNC: 1.7 MG/DL
BILIRUB UR QL STRIP: NEGATIVE
BUN SERPL-MCNC: 18 MG/DL (ref 8–23)
CALCIUM SERPL-MCNC: 9.7 MG/DL (ref 8.8–10.4)
CHLORIDE SERPL-SCNC: 101 MMOL/L (ref 98–107)
CHOLEST SERPL-MCNC: 204 MG/DL
COLOR UR AUTO: YELLOW
CREAT SERPL-MCNC: 0.96 MG/DL (ref 0.51–0.95)
CREAT UR-MCNC: 25.5 MG/DL
EGFRCR SERPLBLD CKD-EPI 2021: 66 ML/MIN/1.73M2
EOSINOPHIL # BLD AUTO: 0.1 10E3/UL (ref 0–0.7)
EOSINOPHIL NFR BLD AUTO: 2 %
ERYTHROCYTE [DISTWIDTH] IN BLOOD BY AUTOMATED COUNT: 11.7 % (ref 10–15)
EST. AVERAGE GLUCOSE BLD GHB EST-MCNC: 128 MG/DL
FASTING STATUS PATIENT QL REPORTED: YES
FASTING STATUS PATIENT QL REPORTED: YES
GLUCOSE SERPL-MCNC: 117 MG/DL (ref 70–99)
GLUCOSE UR STRIP-MCNC: NEGATIVE MG/DL
HBA1C MFR BLD: 6.1 % (ref 0–5.6)
HCO3 SERPL-SCNC: 22 MMOL/L (ref 22–29)
HCT VFR BLD AUTO: 45.5 % (ref 35–47)
HDLC SERPL-MCNC: 43 MG/DL
HGB BLD-MCNC: 15 G/DL (ref 11.7–15.7)
HGB UR QL STRIP: ABNORMAL
IMM GRANULOCYTES # BLD: 0 10E3/UL
IMM GRANULOCYTES NFR BLD: 0 %
KETONES UR STRIP-MCNC: NEGATIVE MG/DL
LDLC SERPL CALC-MCNC: 136 MG/DL
LEUKOCYTE ESTERASE UR QL STRIP: ABNORMAL
LYMPHOCYTES # BLD AUTO: 2 10E3/UL (ref 0.8–5.3)
LYMPHOCYTES NFR BLD AUTO: 31 %
MCH RBC QN AUTO: 30.3 PG (ref 26.5–33)
MCHC RBC AUTO-ENTMCNC: 33 G/DL (ref 31.5–36.5)
MCV RBC AUTO: 92 FL (ref 78–100)
MICROALBUMIN UR-MCNC: 22.5 MG/L
MICROALBUMIN/CREAT UR: 88.24 MG/G CR (ref 0–25)
MONOCYTES # BLD AUTO: 0.5 10E3/UL (ref 0–1.3)
MONOCYTES NFR BLD AUTO: 7 %
NEUTROPHILS # BLD AUTO: 3.8 10E3/UL (ref 1.6–8.3)
NEUTROPHILS NFR BLD AUTO: 59 %
NITRATE UR QL: NEGATIVE
NONHDLC SERPL-MCNC: 161 MG/DL
PH UR STRIP: 5.5 [PH] (ref 5–7)
PLATELET # BLD AUTO: 293 10E3/UL (ref 150–450)
POTASSIUM SERPL-SCNC: 3.8 MMOL/L (ref 3.4–5.3)
PROT SERPL-MCNC: 7.1 G/DL (ref 6.4–8.3)
RBC # BLD AUTO: 4.95 10E6/UL (ref 3.8–5.2)
RBC #/AREA URNS AUTO: ABNORMAL /HPF
SODIUM SERPL-SCNC: 137 MMOL/L (ref 135–145)
SP GR UR STRIP: <=1.005 (ref 1–1.03)
SQUAMOUS #/AREA URNS AUTO: ABNORMAL /LPF
TRIGL SERPL-MCNC: 123 MG/DL
UROBILINOGEN UR STRIP-ACNC: 0.2 E.U./DL
WBC # BLD AUTO: 6.4 10E3/UL (ref 4–11)
WBC #/AREA URNS AUTO: ABNORMAL /HPF

## 2025-03-24 PROCEDURE — 80061 LIPID PANEL: CPT

## 2025-03-24 PROCEDURE — 83036 HEMOGLOBIN GLYCOSYLATED A1C: CPT

## 2025-03-24 PROCEDURE — 82043 UR ALBUMIN QUANTITATIVE: CPT

## 2025-03-24 PROCEDURE — 85025 COMPLETE CBC W/AUTO DIFF WBC: CPT

## 2025-03-24 PROCEDURE — 81001 URINALYSIS AUTO W/SCOPE: CPT

## 2025-03-24 PROCEDURE — 74176 CT ABD & PELVIS W/O CONTRAST: CPT | Mod: GC | Performed by: RADIOLOGY

## 2025-03-24 PROCEDURE — 80053 COMPREHEN METABOLIC PANEL: CPT

## 2025-03-24 PROCEDURE — 82570 ASSAY OF URINE CREATININE: CPT

## 2025-03-24 PROCEDURE — 36415 COLL VENOUS BLD VENIPUNCTURE: CPT

## 2025-03-24 PROCEDURE — 87086 URINE CULTURE/COLONY COUNT: CPT

## 2025-03-25 LAB — BACTERIA UR CULT: NORMAL

## 2025-03-26 DIAGNOSIS — N20.0 NEPHROLITHIASIS: Primary | ICD-10-CM

## 2025-03-26 NOTE — RESULT ENCOUNTER NOTE
Thank you for getting blood work done!  Your cbc, or complete blood count, which measures red blood cells (to check for anemia and other vitamin deficiencies) and white blood cells (to check for infection and leukemia) is normal, which is great!  Your platelets, which reflect liver function and ability to clot, are normal as well.     Your lab test to check for diabetes, HgA1C, also called glycosylated hemoglobin, which measures the level of sugar in your blood over the past few months, is slightly higher than normal but less than 6.5. This is good news, it means you don't have diabetes right now!  However, it does mean that you're at risk for developing diabetes in the future.    Your urine still has red and white blood cells, but no bacteria, which is what we see with kidney stones, not a UTI. The culture was negative (no bacteria).     The testing of your blood sugar, kidney function, liver function and electrolytes was normal.     Thank you for getting your cholesterol checked!  Your total cholesterol and LDL are slightly too high. Your HDL is slightly too low. Your triglycerides look great!    Desired or goal levels are:  CHOLESTEROL: Desirable is less than 200.  HDL (Good Cholesterol): Desirable is greater than 40 in men and greater than 50 in women.  LDL (Bad Cholesterol): Desirable is less than 130  TRIGLYCERIDES: Desirable is less than 150.    Consider starting or increasing your aerobic activity; this is the best way to improve HDL (good) cholesterol. Exercise for at least 30 min 5 times per week, and lift weights 2-3 times per week.    As you may know, abnormal cholesterol is one factor that increases your risk for heart disease and stroke. You can improve your cholesterol by controlling the amount and type of fat you eat and by increasing your daily activity level.    Here are some ways to improve your nutrition (adapted from the American Academy of Family [employer] handout):  Eat less fat (especially  butter, Crisco and other saturated fats)  Buy lean cuts of meat; reduce your portions of red meat or substitute poultry or fish, or avoid meat altogether.  Use skim milk and low-fat dairy products  Eat no more than 4 egg yolks per week  Avoid fried or fast foods that are high in fat  Eat more fruits and vegetables, trying to make your plate of food at least half non-starchy vegetables.     If you have any questions, please contact the clinic or schedule an appointment with me, thank you!    Sincerely,  Dr. Monica Lemus MD  3/26/2025

## 2025-03-28 PROBLEM — N20.0 NEPHROLITHIASIS: Status: RESOLVED | Noted: 2025-03-28 | Resolved: 2025-03-28

## 2025-03-28 PROBLEM — N20.0 NEPHROLITHIASIS: Status: ACTIVE | Noted: 2025-03-28

## 2025-03-28 NOTE — H&P (VIEW-ONLY)
"Preventive Care Visit  [facility]  Monica Lemus MD, Family Medicine  Mar 28, 2025      Assessment & Plan     (Z00.00) Routine general medical examination at a health care facility  (primary encounter diagnosis)    (N20.0) Kidney stone  Comment: reviewed imaging with patient  Plan: follow up urologist to discuss if further testing or treatment is indicated.    BMI  Estimated body mass index is 31.22 kg/m  as calculated from the following:    Height as of this encounter: 1.626 m (5' 4\").    Weight as of this encounter: 82.5 kg (181 lb 14.4 oz).   Weight management plan: great job losing weight    Counseling  Appropriate preventive services were addressed with this patient via screening, questionnaire, or discussion as appropriate for fall prevention, nutrition, physical activity, Tobacco-use cessation, social engagement, weight loss and cognition.  Checklist reviewing preventive services available has been given to the patient.  Reviewed patient's diet, addressing concerns and/or questions.     Wang Sorensen is a 64 year old, presenting for the following:  Physical        3/28/2025     9:05 AM   Additional Questions   Roomed by Pamela TELLO     Advance Care Planning  Patient does not have a Health Care Directive: Discussed advance care planning with patient; information given to patient to review.      3/26/2025   General Health   How would you rate your overall physical health? Good   Feel stress (tense, anxious, or unable to sleep) Only a little   (!) STRESS CONCERN      3/26/2025   Nutrition   Three or more servings of calcium each day? Yes   Diet: Gluten-free/reduced   How many servings of fruit and vegetables per day? 4 or more   How many sweetened beverages each day? 0-1         3/26/2025   Exercise   Days per week of moderate/strenous exercise 5 days   Average minutes spent exercising at this level 50 min         3/26/2025   Social Factors   Frequency of " gathering with friends or relatives Twice a week   Worry food won't last until get money to buy more No   Food not last or not have enough money for food? Yes   Do you have housing? (Housing is defined as stable permanent housing and does not include staying ouside in a car, in a tent, in an abandoned building, in an overnight shelter, or couch-surfing.) Yes   Are you worried about losing your housing? No   Lack of transportation? No   Unable to get utilities (heat,electricity)? No   (!) FOOD SECURITY CONCERN PRESENT      3/26/2025   Fall Risk   Fallen 2 or more times in the past year? No   Trouble with walking or balance? No          3/26/2025   Dental   Dentist two times every year? Yes           Today's PHQ-2 Score:       3/28/2025     8:57 AM   PHQ-2 (  [employer])   Q1: Little interest or pleasure in doing things 0   Q2: Feeling down, depressed or hopeless 0   PHQ-2 Score 0    Q1: Little interest or pleasure in doing things Not at all   Q2: Feeling down, depressed or hopeless Not at all   PHQ-2 Score 0       Patient-reported           3/26/2025   Substance Use   Alcohol more than 3/day or more than 7/wk No   Do you use any other substances recreationally? (!) BATH SALTS     Social History     Tobacco Use    Smoking status: Former     Current packs/day: 0.00     Types: Cigarettes     Start date: 1986     Quit date: 3/12/1991     Years since quittin.0    Smokeless tobacco: Never    Tobacco comments:     Was a light smoker for a few years in my youth.   Vaping Use    Vaping status: Never Used   Substance Use Topics    Alcohol use: Yes     Comment: rare - one glass per week of wine    Drug use: No           2024   LAST FHS-7 RESULTS   1st degree relative breast or ovarian cancer No   Any relative bilateral breast cancer No   Any male have breast cancer No   Any ONE woman have BOTH breast AND ovarian cancer No   Any woman with breast cancer before 50yrs No   2 or more relatives with breast AND/OR  ovarian cancer No   2 or more relatives with breast AND/OR bowel cancer No        Mammogram Screening - Mammogram every 1-2 years updated in Health Maintenance based on mutual decision making        3/26/2025   STI Screening   New sexual partner(s) since last STI/HIV test? (!) YES      History of abnormal Pap smear: No - age 30- 64 PAP with HPV every 5 years recommended        Latest Ref Rng & Units 10/13/2022     3:56 PM 11/17/2017     9:14 AM 11/17/2017     9:05 AM   PAP / HPV   PAP  Negative for Intraepithelial Lesion or Malignancy (NILM)      PAP (Historical)    NIL    HPV 16 DNA Negative Negative  Negative     HPV 18 DNA Negative Negative  Negative     Other HR HPV Negative Negative  Negative       ASCVD Risk   The 10-year ASCVD risk score (Yenny TOVRA, et al., 2019) is: 5.8%    Values used to calculate the score:      Age: 64 years      Sex: Female      Is Non- : No      Diabetic: No      Tobacco smoker: No      Systolic Blood Pressure: 117 mmHg      Is BP treated: Yes      HDL Cholesterol: 43 mg/dL      Total Cholesterol: 204 mg/dL       Reviewed and updated as needed this visit by Provider   Tobacco  Allergies  Meds  Problems  Med Hx  Surg Hx  Fam Hx  Soc   Hx Sexual Activity          Past Medical History:   Diagnosis Date    Calculus, kidney 2007    CKD (chronic kidney disease) stage 3, GFR 30-59 ml/min (H)     Hypercholesteremia 11/29/2018    Hypertension goal BP (blood pressure) < 140/90 11/05/2012    Seasonal allergic rhinitis     seasonal allergies, cats    Tubular adenoma of colon 10/07/2014    Colonoscopy 10/1/2014, repeat 2019    Urinary tract infection, site not specified 1980 to early '90's    Past history of      Past Surgical History:   Procedure Laterality Date    BIOPSY BREAST      X 2    COLONOSCOPY  6/15/2009    Polyp - Due in 5 years    CYSTOSCOPY, RETROGRADES, MANIPULATE STONE, INSERT STENT, COMBINED  2007    EXCISE PILONIDAL CYST, EXTENSIVE      LASER  "REVOLIX LITHOTRIPSY URETER(S), INSERT STENT, COMBINED      Sebaceous Cyst Removal  2008    Back     OB History    Para Term  AB Living   4 0 0 0 4 0   SAB IAB Ectopic Multiple Live Births   0 4 0 0 0      # Outcome Date GA Lbr Jeffery/2nd Weight Sex Type Anes PTL Lv   4 IAB            3 IAB            2 IAB            1 IAB                  Review of Systems  Constitutional, HEENT, cardiovascular, pulmonary, gi and gu systems are negative, except as otherwise noted.     Objective    Exam  /79 (BP Location: Right arm, Patient Position: Sitting, Cuff Size: Adult Regular)   Pulse 85   Temp 98.4  F (36.9  C) (Temporal)   Resp 16   Ht 1.626 m (5' 4\")   Wt 82.5 kg (181 lb 14.4 oz)   LMP 2014   SpO2 97%   BMI 31.22 kg/m     Estimated body mass index is 31.22 kg/m  as calculated from the following:    Height as of this encounter: 1.626 m (5' 4\").    Weight as of this encounter: 82.5 kg (181 lb 14.4 oz).    Physical Exam  GENERAL: alert and no distress  NECK: no adenopathy, no asymmetry, masses, or scars  RESP: lungs clear to auscultation - no rales, rhonchi or wheezes  CV: regular rate and rhythm, normal S1 S2, no S3 or S4, no murmur, click or rub, no peripheral edema  ABDOMEN: soft, nontender, no hepatosplenomegaly, no masses and bowel sounds normal  MS: no gross musculoskeletal defects noted, no edema  BACK: no CVA tenderness, no paralumbar tenderness  PSYCH: mentation appears normal, affect normal/bright        Signed Electronically by: Monica Lemus MD    "

## 2025-04-01 ENCOUNTER — VIRTUAL VISIT (OUTPATIENT)
Dept: UROLOGY | Facility: CLINIC | Age: 65
End: 2025-04-01
Payer: COMMERCIAL

## 2025-04-01 DIAGNOSIS — N20.0 NEPHROLITHIASIS: ICD-10-CM

## 2025-04-01 PROCEDURE — 1126F AMNT PAIN NOTED NONE PRSNT: CPT | Performed by: NURSE PRACTITIONER

## 2025-04-01 PROCEDURE — 98001 SYNCH AUDIO-VIDEO NEW LOW 30: CPT | Performed by: NURSE PRACTITIONER

## 2025-04-01 NOTE — PROGRESS NOTES
Virtual Visit Details  Type of service:  Video Visit   Originating Location (pt. Location): Home  Distant Location (provider location):  On-site  Platform used for Video Visit: [facility]      Urology Virtual Visit    Name: Franchesca Hyatt    MRN: [id number]   YOB: 1960               Chief Complaint:   Nephrolithiasis         Impression and Plan:   Impression / Plan:   Franchesca Hyatt is a 64 year old female with large nonobstructing inferior L renal stone.      -We discussed surveillance, URS, PCNL as options for management of her stone burden. Discussed pros/cons of each in detail. After shared decision making process patient elects to pursue ureteroscopy with steerable suction. Will plan to schedule with Dr. Van. We discussed risks of procedure including risk of need for secondary procedure, incomplete stone clearance, stent related pain/hematuria, risk of infection, bleeding risk, injury to bladder/ureter/kidney.    Thank you for the opportunity to participate in the care of Franchesca Hyatt.     GAVIN Roman, CNP   [facility] - Department of Urology  [phone]          History of Present Illness:   Franchesca Hyatt is a 64 year old female with CKDA 3a, KTN, HLD, 1 cm x 7 mm left ureteropelvic junction stone requiring URS in 2007, here for follow-up on large inferior L renal stone seen on CT 3/24/25.  Denies gross hematuria or dysuria. Reports occasional R flank pain. Notes occasional urinary urgency. Has a history of MSK back pain. No recent UTIs.    History is obtained from patient & EMR    Pertinent imaging reviewed:  CT ABDOMEN PELVIS W/O CONTRAST 3/24/2025 7:07 AM   RENAL: Large stone within the inferior aspect of the left renal  collecting system measuring up to 15 mm. Minimal left pelviectasis.  Multifocal left renal cortical scarring. The right kidney is  unremarkable.  URINARY BLADDER: Unremarkable.               Past Medical History:     Past Medical History:   Diagnosis Date     Calculus, kidney     CKD (chronic kidney disease) stage 3, GFR 30-59 ml/min (H)     Hypercholesteremia 2018    Hypertension goal BP (blood pressure) < 140/90 2012    Seasonal allergic rhinitis     seasonal allergies, cats    Tubular adenoma of colon 10/07/2014    Colonoscopy 10/1/2014, repeat 2019    Urinary tract infection, site not specified  to early     Past history of           Past Surgical History:     Past Surgical History:   Procedure Laterality Date    BIOPSY BREAST      X 2    COLONOSCOPY  6/15/2009    Polyp - Due in 5 years    CYSTOSCOPY, RETROGRADES, MANIPULATE STONE, INSERT STENT, COMBINED      EXCISE PILONIDAL CYST, EXTENSIVE      LASER REVOLIX LITHOTRIPSY URETER(S), INSERT STENT, COMBINED      Sebaceous Cyst Removal      Back          Social History:     Social History     Tobacco Use    Smoking status: Former     Current packs/day: 0.00     Types: Cigarettes     Start date: 1986     Quit date: 3/12/1991     Years since quittin.0    Smokeless tobacco: Never    Tobacco comments:     Was a light smoker for a few years in my youth.   Substance Use Topics    Alcohol use: Yes     Comment: rare - one glass per week of wine          Family History:     Family History   Problem Relation Age of Onset    Hypertension Mother     Cerebrovascular Disease Mother         at age 50    Allergies Mother     Arthritis Mother         osteo    Depression Mother         Treated holistically    Cancer - colorectal Mother         BENIGN polyps 67 ()    Hypertension Father     Allergies Father     Depression Father         Took medication    Cerebrovascular Disease Father     Heart Disease Father         had heart surgery and a stroke     Cancer - colorectal Maternal Grandmother         at age 50    Alzheimer Disease Maternal Grandmother         diag at age 80    Eye Disorder Maternal Grandmother         cateracts    Osteoporosis Maternal Grandmother     Cerebrovascular  Disease Maternal Grandmother     Colon Cancer Maternal Grandmother     Cancer Maternal Grandfather         smoker    Cancer Paternal Grandfather         smoker    Hypertension Brother     Depression Sister         Holistically    Gynecology Sister         cervical cancer at age 30    Cancer Sister         cervical          Allergies:     Allergies   Allergen Reactions    Latex Hives and Itching    Seasonal Allergies     Vicodin [Hydrocodone-Acetaminophen]           Medications:     Current Outpatient Medications   Medication Sig Dispense Refill    ACIDOPHILUS OR CAPS 2 caps. daily  0    Ascorbic Acid (VITAMIN C PO)       bisacodyl (DULCOLAX) 5 MG EC tablet Take 2 tablets at 3 pm the day before your procedure. If your procedure is before 11 am, take 2 additional tablets at 11 pm. If your procedure is after 11 am, take 2 additional tablets at 6 am. For additional instructions refer to your colonoscopy prep instructions. (Patient not taking: Reported on 3/28/2025) 4 tablet 0    Cyanocobalamin (VITAMIN B 12 PO) Take by mouth daily       DIGEST ENZYMES-ANTICHOLINERGIC OR CAPS       Flaxseed, Linseed, (FLAX SEED OIL PO) Take by mouth daily      hydrOXYzine HCl (ATARAX) 25 MG tablet Take 1/2-2 tabs po HS prn itching 30 tablet 1    lisinopril (ZESTRIL) 10 MG tablet Take 1 tablet (10 mg) by mouth 2 times daily. 180 tablet 3    MAGNESIUM 300 MG OR CAPS At Bedtime       Multiple Vitamins-Minerals (ZINC PO) Take by mouth daily ZINC       Nettle, Urtica Dioica, (NETTLE LEAF PO)       order for DME Equipment being ordered: SAD lite box 1 each 0    polyethylene glycol (GOLYTELY) 236 g suspension The night before the exam at 6 pm drink an 8-ounce glass every 15 minutes until the jug is half empty. If you arrive before 11 AM: Drink the other half of the Golytely jug at 11 PM night before procedure. If you arrive after 11 AM: Drink the other half of the Golytely jug at 6 AM day of procedure. For additional instructions refer to your  "colonoscopy prep instructions. (Patient not taking: Reported on 3/28/2025) 4000 mL 0    VITAMIN D OR 1 cap daily in the winter       No current facility-administered medications for this visit.          Review of Systems:    ROS: See HPI for pertinent details.  Remainder of 10-point ROS negative.         Physical Exam:   VS:  T: Data Unavailable    HR: Data Unavailable    BP: Data Unavailable    RR: Data Unavailable     GEN:  Alert.  NAD.    HEENT:  Sclerae anicteric.    CV:  No obvious jugular venous distension  LUNGS: No respiratory distress, breathing comfortably wo accessory muscle use.  SKIN:  No visible rash  NEURO:  CN grossly intact.          Laboratory Data:   No results found for: \"PSA\"  Lab Results   Component Value Date    CR 0.96 03/24/2025    CR 1.04 01/18/2024    CR 1.00 08/23/2023    CR 1.06 10/13/2022    CR 1.11 03/10/2022    CR 1.09 04/01/2021    CR 1.15 11/22/2019    CR 1.05 11/20/2018    CR 1.11 11/17/2017    CR 1.14 11/15/2016     Lab Results   Component Value Date    GFRESTIMATED 66 03/24/2025    GFRESTIMATED 60 01/18/2024    GFRESTIMATED 64 08/23/2023    GFRESTIMATED 60 10/13/2022    GFRESTIMATED 57 03/10/2022    GFRESTIMATED 55 04/01/2021    GFRESTIMATED 52 11/22/2019    GFRESTIMATED 54 11/20/2018    GFRESTIMATED 51 11/17/2017    GFRESTIMATED 49 11/15/2016          "

## 2025-04-01 NOTE — NURSING NOTE
Current patient location: [address] [place] [id number]    Is the patient currently in the state of [place]? YES    Visit mode: VIDEO    If the visit is dropped, the patient can be reconnected by:VIDEO VISIT: Text to cell phone:   Telephone Information:   Mobile [phone]       Will anyone else be joining the visit? NO  (If patient encounters technical issues they should call [phone])    Are changes needed to the allergy or medication list? No and Pt stated no med changes    Are refills needed on medications prescribed by this physician? NO    Rooming Documentation:  Not applicable    Reason for visit: Consult    Henna CANTU

## 2025-04-01 NOTE — LETTER
4/1/2025       RE: Franchesca Hyatt  [address]  [facility] [address]     Dear Colleague,    Thank you for referring your patient, Franchesca Hyatt, to the [facility] [place] at [facility]. Please see a copy of my visit note below.    Virtual Visit Details  Type of service:  Video Visit   Originating Location (pt. Location): Home  Distant Location (provider location):  On-site  Platform used for Video Visit: [facility]      Urology Virtual Visit    Name: Franchesca Hyatt    MRN: [id number]   YOB: 1960               Chief Complaint:   Nephrolithiasis         Impression and Plan:   Impression / Plan:   Franchesca Hyatt is a 64 year old female with large nonobstructing inferior L renal stone.      -We discussed surveillance, URS, PCNL as options for management of her stone burden. Discussed pros/cons of each in detail. After shared decision making process patient elects to pursue ureteroscopy with steerable suction. Will plan to schedule with Dr. Van. We discussed risks of procedure including risk of need for secondary procedure, incomplete stone clearance, stent related pain/hematuria, risk of infection, bleeding risk, injury to bladder/ureter/kidney.    Thank you for the opportunity to participate in the care of Franchesca Hyatt.     GAVIN Roman, CNP  [facility] - Department of Urology  [phone]          History of Present Illness:   Franchesca Hyatt is a 64 year old female with CKDA 3a, KTN, HLD, 1 cm x 7 mm left ureteropelvic junction stone requiring URS in 2007, here for follow-up on large inferior L renal stone seen on CT 3/24/25.  Denies gross hematuria or dysuria. Reports occasional R flank pain. Notes occasional urinary urgency. Has a history of MSK back pain. No recent UTIs.    History is obtained from patient & EMR    Pertinent imaging reviewed:  CT ABDOMEN PELVIS W/O CONTRAST 3/24/2025 7:07 AM   RENAL: Large  stone within the inferior aspect of the left renal  collecting system measuring up to 15 mm. Minimal left pelviectasis.  Multifocal left renal cortical scarring. The right kidney is  unremarkable.  URINARY BLADDER: Unremarkable.               Past Medical History:     Past Medical History:   Diagnosis Date     Calculus, kidney      CKD (chronic kidney disease) stage 3, GFR 30-59 ml/min (H)      Hypercholesteremia 2018     Hypertension goal BP (blood pressure) < 140/90 2012     Seasonal allergic rhinitis     seasonal allergies, cats     Tubular adenoma of colon 10/07/2014    Colonoscopy 10/1/2014, repeat 2019     Urinary tract infection, site not specified  to early     Past history of           Past Surgical History:     Past Surgical History:   Procedure Laterality Date     BIOPSY BREAST      X 2     COLONOSCOPY  6/15/2009    Polyp - Due in 5 years     CYSTOSCOPY, RETROGRADES, MANIPULATE STONE, INSERT STENT, COMBINED       EXCISE PILONIDAL CYST, EXTENSIVE       LASER REVOLIX LITHOTRIPSY URETER(S), INSERT STENT, COMBINED       Sebaceous Cyst Removal      Back          Social History:     Social History     Tobacco Use     Smoking status: Former     Current packs/day: 0.00     Types: Cigarettes     Start date: 1986     Quit date: 3/12/1991     Years since quittin.0     Smokeless tobacco: Never     Tobacco comments:     Was a light smoker for a few years in my youth.   Substance Use Topics     Alcohol use: Yes     Comment: rare - one glass per week of wine          Family History:     Family History   Problem Relation Age of Onset     Hypertension Mother      Cerebrovascular Disease Mother         at age 50     Allergies Mother      Arthritis Mother         osteo     Depression Mother         Treated holistically     Cancer - colorectal Mother         BENIGN polyps 67 ()     Hypertension Father      Allergies Father      Depression Father         Took medication      Cerebrovascular Disease Father      Heart Disease Father         had heart surgery and a stroke      Cancer - colorectal Maternal Grandmother         at age 50     Alzheimer Disease Maternal Grandmother         diag at age 80     Eye Disorder Maternal Grandmother         cateracts     Osteoporosis Maternal Grandmother      Cerebrovascular Disease Maternal Grandmother      Colon Cancer Maternal Grandmother      Cancer Maternal Grandfather         smoker     Cancer Paternal Grandfather         smoker     Hypertension Brother      Depression Sister         Holistically     Gynecology Sister         cervical cancer at age 30     Cancer Sister         cervical          Allergies:     Allergies   Allergen Reactions     Latex Hives and Itching     Seasonal Allergies      Vicodin [Hydrocodone-Acetaminophen]           Medications:     Current Outpatient Medications   Medication Sig Dispense Refill     ACIDOPHILUS OR CAPS 2 caps. daily  0     Ascorbic Acid (VITAMIN C PO)        bisacodyl (DULCOLAX) 5 MG EC tablet Take 2 tablets at 3 pm the day before your procedure. If your procedure is before 11 am, take 2 additional tablets at 11 pm. If your procedure is after 11 am, take 2 additional tablets at 6 am. For additional instructions refer to your colonoscopy prep instructions. (Patient not taking: Reported on 3/28/2025) 4 tablet 0     Cyanocobalamin (VITAMIN B 12 PO) Take by mouth daily        DIGEST ENZYMES-ANTICHOLINERGIC OR CAPS        Flaxseed, Linseed, (FLAX SEED OIL PO) Take by mouth daily       hydrOXYzine HCl (ATARAX) 25 MG tablet Take 1/2-2 tabs po HS prn itching 30 tablet 1     lisinopril (ZESTRIL) 10 MG tablet Take 1 tablet (10 mg) by mouth 2 times daily. 180 tablet 3     MAGNESIUM 300 MG OR CAPS At Bedtime        Multiple Vitamins-Minerals (ZINC PO) Take by mouth daily ZINC        Nettle, Urtica Dioica, (NETTLE LEAF PO)        order for DME Equipment being ordered: SAD lite box 1 each 0     polyethylene glycol  "(GOLYTELY) 236 g suspension The night before the exam at 6 pm drink an 8-ounce glass every 15 minutes until the jug is half empty. If you arrive before 11 AM: Drink the other half of the Golytely jug at 11 PM night before procedure. If you arrive after 11 AM: Drink the other half of the Golytely jug at 6 AM day of procedure. For additional instructions refer to your colonoscopy prep instructions. (Patient not taking: Reported on 3/28/2025) 4000 mL 0     VITAMIN D OR 1 cap daily in the winter       No current facility-administered medications for this visit.          Review of Systems:    ROS: See HPI for pertinent details.  Remainder of 10-point ROS negative.         Physical Exam:   VS:  T: Data Unavailable    HR: Data Unavailable    BP: Data Unavailable    RR: Data Unavailable     GEN:  Alert.  NAD.    HEENT:  Sclerae anicteric.    CV:  No obvious jugular venous distension  LUNGS: No respiratory distress, breathing comfortably wo accessory muscle use.  SKIN:  No visible rash  NEURO:  CN grossly intact.          Laboratory Data:   No results found for: \"PSA\"  Lab Results   Component Value Date    CR 0.96 03/24/2025    CR 1.04 01/18/2024    CR 1.00 08/23/2023    CR 1.06 10/13/2022    CR 1.11 03/10/2022    CR 1.09 04/01/2021    CR 1.15 11/22/2019    CR 1.05 11/20/2018    CR 1.11 11/17/2017    CR 1.14 11/15/2016     Lab Results   Component Value Date    GFRESTIMATED 66 03/24/2025    GFRESTIMATED 60 01/18/2024    GFRESTIMATED 64 08/23/2023    GFRESTIMATED 60 10/13/2022    GFRESTIMATED 57 03/10/2022    GFRESTIMATED 55 04/01/2021    GFRESTIMATED 52 11/22/2019    GFRESTIMATED 54 11/20/2018    GFRESTIMATED 51 11/17/2017    GFRESTIMATED 49 11/15/2016              Again, thank you for allowing me to participate in the care of your patient.      Sincerely,    GAVIN Segura CNP    "

## 2025-04-02 ENCOUNTER — PREP FOR PROCEDURE (OUTPATIENT)
Dept: SURGERY | Facility: CLINIC | Age: 65
End: 2025-04-02
Payer: COMMERCIAL

## 2025-04-02 DIAGNOSIS — N20.0 KIDNEY STONE ON LEFT SIDE: Primary | ICD-10-CM

## 2025-04-06 ENCOUNTER — ANESTHESIA EVENT (OUTPATIENT)
Dept: SURGERY | Facility: CLINIC | Age: 65
End: 2025-04-06
Payer: COMMERCIAL

## 2025-04-07 ENCOUNTER — APPOINTMENT (OUTPATIENT)
Dept: GENERAL RADIOLOGY | Facility: CLINIC | Age: 65
End: 2025-04-07
Attending: UROLOGY
Payer: COMMERCIAL

## 2025-04-07 ENCOUNTER — ANESTHESIA (OUTPATIENT)
Dept: SURGERY | Facility: CLINIC | Age: 65
End: 2025-04-07
Payer: COMMERCIAL

## 2025-04-07 ENCOUNTER — HOSPITAL ENCOUNTER (OUTPATIENT)
Facility: CLINIC | Age: 65
Discharge: HOME | End: 2025-04-07
Attending: UROLOGY | Admitting: UROLOGY
Payer: COMMERCIAL

## 2025-04-07 VITALS
HEIGHT: 65 IN | DIASTOLIC BLOOD PRESSURE: 80 MMHG | OXYGEN SATURATION: 96 % | TEMPERATURE: 96.6 F | SYSTOLIC BLOOD PRESSURE: 106 MMHG | RESPIRATION RATE: 16 BRPM | WEIGHT: 183.1 LBS | HEART RATE: 61 BPM | BODY MASS INDEX: 30.51 KG/M2

## 2025-04-07 DIAGNOSIS — N20.0 KIDNEY STONE ON LEFT SIDE: Primary | ICD-10-CM

## 2025-04-07 PROCEDURE — 250N000011 HC RX IP 250 OP 636: Performed by: STUDENT IN AN ORGANIZED HEALTH CARE EDUCATION/TRAINING PROGRAM

## 2025-04-07 PROCEDURE — 250N000011 HC RX IP 250 OP 636: Performed by: REGISTERED NURSE

## 2025-04-07 PROCEDURE — 250N000011 HC RX IP 250 OP 636: Performed by: UROLOGY

## 2025-04-07 PROCEDURE — 250N000009 HC RX 250: Performed by: STUDENT IN AN ORGANIZED HEALTH CARE EDUCATION/TRAINING PROGRAM

## 2025-04-07 PROCEDURE — 258N000001 HC RX 258: Performed by: UROLOGY

## 2025-04-07 PROCEDURE — 250N000009 HC RX 250: Performed by: REGISTERED NURSE

## 2025-04-07 PROCEDURE — 360N000077 HC SURGERY LEVEL 4, PER MIN: Performed by: UROLOGY

## 2025-04-07 PROCEDURE — 258N000003 HC RX IP 258 OP 636: Performed by: REGISTERED NURSE

## 2025-04-07 PROCEDURE — C1894 INTRO/SHEATH, NON-LASER: HCPCS | Performed by: UROLOGY

## 2025-04-07 PROCEDURE — 52356 CYSTO/URETERO W/LITHOTRIPSY: CPT | Mod: 22 | Performed by: UROLOGY

## 2025-04-07 PROCEDURE — 250N000013 HC RX MED GY IP 250 OP 250 PS 637: Performed by: UROLOGY

## 2025-04-07 PROCEDURE — 250N000009 HC RX 250: Performed by: UROLOGY

## 2025-04-07 PROCEDURE — 710N000012 HC RECOVERY PHASE 2, PER MINUTE: Performed by: UROLOGY

## 2025-04-07 PROCEDURE — 370N000017 HC ANESTHESIA TECHNICAL FEE, PER MIN: Performed by: UROLOGY

## 2025-04-07 PROCEDURE — C2617 STENT, NON-COR, TEM W/O DEL: HCPCS | Performed by: UROLOGY

## 2025-04-07 PROCEDURE — 74420 UROGRAPHY RTRGR +-KUB: CPT | Mod: 26 | Performed by: UROLOGY

## 2025-04-07 PROCEDURE — 272N000002 HC OR SUPPLY OTHER OPNP: Performed by: UROLOGY

## 2025-04-07 PROCEDURE — 710N000009 HC RECOVERY PHASE 1, LEVEL 1, PER MIN: Performed by: UROLOGY

## 2025-04-07 PROCEDURE — 82365 CALCULUS SPECTROSCOPY: CPT | Performed by: UROLOGY

## 2025-04-07 PROCEDURE — C1747 HC OR ENDOSCOPE, SGL USE,URINARY TRACT, IMAGING/ILLUMINATION DEVICE: HCPCS | Performed by: UROLOGY

## 2025-04-07 PROCEDURE — 999N000141 HC STATISTIC PRE-PROCEDURE NURSING ASSESSMENT: Performed by: UROLOGY

## 2025-04-07 PROCEDURE — 272N000001 HC OR GENERAL SUPPLY STERILE: Performed by: UROLOGY

## 2025-04-07 PROCEDURE — C1769 GUIDE WIRE: HCPCS | Performed by: UROLOGY

## 2025-04-07 PROCEDURE — 250N000011 HC RX IP 250 OP 636: Performed by: NURSE ANESTHETIST, CERTIFIED REGISTERED

## 2025-04-07 PROCEDURE — 999N000179 XR SURGERY CARM FLUORO LESS THAN 5 MIN W STILLS

## 2025-04-07 DEVICE — URETERAL STENT
Type: IMPLANTABLE DEVICE | Site: URETHRA | Status: FUNCTIONAL
Brand: POLARIS™ ULTRA

## 2025-04-07 RX ORDER — CEFAZOLIN SODIUM/WATER 2 G/20 ML
2 SYRINGE (ML) INTRAVENOUS SEE ADMIN INSTRUCTIONS
Status: DISCONTINUED | OUTPATIENT
Start: 2025-04-07 | End: 2025-04-07 | Stop reason: HOSPADM

## 2025-04-07 RX ORDER — PROPOFOL 10 MG/ML
INJECTION, EMULSION INTRAVENOUS PRN
Status: DISCONTINUED | OUTPATIENT
Start: 2025-04-07 | End: 2025-04-07

## 2025-04-07 RX ORDER — DEXAMETHASONE SODIUM PHOSPHATE 4 MG/ML
INJECTION, SOLUTION INTRA-ARTICULAR; INTRALESIONAL; INTRAMUSCULAR; INTRAVENOUS; SOFT TISSUE PRN
Status: DISCONTINUED | OUTPATIENT
Start: 2025-04-07 | End: 2025-04-07

## 2025-04-07 RX ORDER — TAMSULOSIN HYDROCHLORIDE 0.4 MG/1
0.4 CAPSULE ORAL DAILY
Qty: 10 CAPSULE | Refills: 2 | Status: SHIPPED | OUTPATIENT
Start: 2025-04-07

## 2025-04-07 RX ORDER — MAGNESIUM HYDROXIDE 1200 MG/15ML
LIQUID ORAL PRN
Status: DISCONTINUED | OUTPATIENT
Start: 2025-04-07 | End: 2025-04-07 | Stop reason: HOSPADM

## 2025-04-07 RX ORDER — LIDOCAINE HYDROCHLORIDE 20 MG/ML
INJECTION, SOLUTION INFILTRATION; PERINEURAL PRN
Status: DISCONTINUED | OUTPATIENT
Start: 2025-04-07 | End: 2025-04-07

## 2025-04-07 RX ORDER — HYDROMORPHONE HCL IN WATER/PF 6 MG/30 ML
0.2 PATIENT CONTROLLED ANALGESIA SYRINGE INTRAVENOUS EVERY 5 MIN PRN
Status: DISCONTINUED | OUTPATIENT
Start: 2025-04-07 | End: 2025-04-07 | Stop reason: HOSPADM

## 2025-04-07 RX ORDER — KETOROLAC TROMETHAMINE 10 MG/1
10 TABLET, FILM COATED ORAL EVERY 6 HOURS
Qty: 20 TABLET | Refills: 0 | Status: SHIPPED | OUTPATIENT
Start: 2025-04-07 | End: 2025-04-12

## 2025-04-07 RX ORDER — ONDANSETRON 2 MG/ML
INJECTION INTRAMUSCULAR; INTRAVENOUS PRN
Status: DISCONTINUED | OUTPATIENT
Start: 2025-04-07 | End: 2025-04-07

## 2025-04-07 RX ORDER — HYDROMORPHONE HCL IN WATER/PF 6 MG/30 ML
0.4 PATIENT CONTROLLED ANALGESIA SYRINGE INTRAVENOUS EVERY 5 MIN PRN
Status: DISCONTINUED | OUTPATIENT
Start: 2025-04-07 | End: 2025-04-07 | Stop reason: HOSPADM

## 2025-04-07 RX ORDER — FENTANYL CITRATE 50 UG/ML
INJECTION, SOLUTION INTRAMUSCULAR; INTRAVENOUS PRN
Status: DISCONTINUED | OUTPATIENT
Start: 2025-04-07 | End: 2025-04-07

## 2025-04-07 RX ORDER — CEFAZOLIN SODIUM/WATER 2 G/20 ML
2 SYRINGE (ML) INTRAVENOUS
Status: DISCONTINUED | OUTPATIENT
Start: 2025-04-07 | End: 2025-04-07 | Stop reason: HOSPADM

## 2025-04-07 RX ORDER — ACETAMINOPHEN 650 MG/1
650 SUPPOSITORY RECTAL ONCE
Status: DISCONTINUED | OUTPATIENT
Start: 2025-04-07 | End: 2025-04-07 | Stop reason: HOSPADM

## 2025-04-07 RX ORDER — KETOROLAC TROMETHAMINE 30 MG/ML
INJECTION, SOLUTION INTRAMUSCULAR; INTRAVENOUS PRN
Status: DISCONTINUED | OUTPATIENT
Start: 2025-04-07 | End: 2025-04-07

## 2025-04-07 RX ORDER — SODIUM CHLORIDE, SODIUM LACTATE, POTASSIUM CHLORIDE, CALCIUM CHLORIDE 600; 310; 30; 20 MG/100ML; MG/100ML; MG/100ML; MG/100ML
INJECTION, SOLUTION INTRAVENOUS CONTINUOUS PRN
Status: DISCONTINUED | OUTPATIENT
Start: 2025-04-07 | End: 2025-04-07

## 2025-04-07 RX ORDER — ACETAMINOPHEN 325 MG/1
975 TABLET ORAL ONCE
Status: COMPLETED | OUTPATIENT
Start: 2025-04-07 | End: 2025-04-07

## 2025-04-07 RX ORDER — ONDANSETRON 4 MG/1
4 TABLET, ORALLY DISINTEGRATING ORAL EVERY 30 MIN PRN
Status: DISCONTINUED | OUTPATIENT
Start: 2025-04-07 | End: 2025-04-07 | Stop reason: HOSPADM

## 2025-04-07 RX ORDER — OXYBUTYNIN CHLORIDE 5 MG/1
5 TABLET, EXTENDED RELEASE ORAL DAILY
Qty: 10 TABLET | Refills: 2 | Status: SHIPPED | OUTPATIENT
Start: 2025-04-07

## 2025-04-07 RX ORDER — SODIUM CHLORIDE, SODIUM LACTATE, POTASSIUM CHLORIDE, CALCIUM CHLORIDE 600; 310; 30; 20 MG/100ML; MG/100ML; MG/100ML; MG/100ML
INJECTION, SOLUTION INTRAVENOUS CONTINUOUS
Status: DISCONTINUED | OUTPATIENT
Start: 2025-04-07 | End: 2025-04-07 | Stop reason: HOSPADM

## 2025-04-07 RX ORDER — ACETAMINOPHEN 325 MG/1
975 TABLET ORAL ONCE
Status: DISCONTINUED | OUTPATIENT
Start: 2025-04-07 | End: 2025-04-07 | Stop reason: HOSPADM

## 2025-04-07 RX ORDER — PROPOFOL 10 MG/ML
INJECTION, EMULSION INTRAVENOUS CONTINUOUS PRN
Status: DISCONTINUED | OUTPATIENT
Start: 2025-04-07 | End: 2025-04-07

## 2025-04-07 RX ORDER — ONDANSETRON 2 MG/ML
4 INJECTION INTRAMUSCULAR; INTRAVENOUS EVERY 30 MIN PRN
Status: DISCONTINUED | OUTPATIENT
Start: 2025-04-07 | End: 2025-04-07 | Stop reason: HOSPADM

## 2025-04-07 RX ORDER — FUROSEMIDE 10 MG/ML
INJECTION INTRAMUSCULAR; INTRAVENOUS PRN
Status: DISCONTINUED | OUTPATIENT
Start: 2025-04-07 | End: 2025-04-07

## 2025-04-07 RX ORDER — CIPROFLOXACIN 500 MG/1
500 TABLET, FILM COATED ORAL ONCE
Qty: 1 TABLET | Refills: 0 | Status: SHIPPED | OUTPATIENT
Start: 2025-04-07 | End: 2025-04-07

## 2025-04-07 RX ORDER — IOPAMIDOL 612 MG/ML
INJECTION, SOLUTION INTRATHECAL PRN
Status: DISCONTINUED | OUTPATIENT
Start: 2025-04-07 | End: 2025-04-07 | Stop reason: HOSPADM

## 2025-04-07 RX ORDER — NALOXONE HYDROCHLORIDE 0.4 MG/ML
0.1 INJECTION, SOLUTION INTRAMUSCULAR; INTRAVENOUS; SUBCUTANEOUS
Status: DISCONTINUED | OUTPATIENT
Start: 2025-04-07 | End: 2025-04-07 | Stop reason: HOSPADM

## 2025-04-07 RX ORDER — ACETAMINOPHEN 650 MG/1
650 SUPPOSITORY RECTAL ONCE
Status: COMPLETED | OUTPATIENT
Start: 2025-04-07 | End: 2025-04-07

## 2025-04-07 RX ORDER — SCOPOLAMINE 1 MG/3D
1 PATCH, EXTENDED RELEASE TRANSDERMAL
Status: DISCONTINUED | OUTPATIENT
Start: 2025-04-07 | End: 2025-04-07 | Stop reason: HOSPADM

## 2025-04-07 RX ORDER — FENTANYL CITRATE 50 UG/ML
50 INJECTION, SOLUTION INTRAMUSCULAR; INTRAVENOUS EVERY 5 MIN PRN
Status: DISCONTINUED | OUTPATIENT
Start: 2025-04-07 | End: 2025-04-07 | Stop reason: HOSPADM

## 2025-04-07 RX ORDER — ASPIRIN 81 MG
100 TABLET, DELAYED RELEASE (ENTERIC COATED) ORAL DAILY
Qty: 60 TABLET | Refills: 1 | Status: SHIPPED | OUTPATIENT
Start: 2025-04-07

## 2025-04-07 RX ORDER — DEXAMETHASONE SODIUM PHOSPHATE 4 MG/ML
4 INJECTION, SOLUTION INTRA-ARTICULAR; INTRALESIONAL; INTRAMUSCULAR; INTRAVENOUS; SOFT TISSUE
Status: DISCONTINUED | OUTPATIENT
Start: 2025-04-07 | End: 2025-04-07 | Stop reason: HOSPADM

## 2025-04-07 RX ORDER — FENTANYL CITRATE 50 UG/ML
25 INJECTION, SOLUTION INTRAMUSCULAR; INTRAVENOUS EVERY 5 MIN PRN
Status: DISCONTINUED | OUTPATIENT
Start: 2025-04-07 | End: 2025-04-07 | Stop reason: HOSPADM

## 2025-04-07 RX ORDER — OXYCODONE HYDROCHLORIDE 5 MG/1
5 TABLET ORAL EVERY 6 HOURS PRN
Qty: 9 TABLET | Refills: 0 | Status: SHIPPED | OUTPATIENT
Start: 2025-04-07 | End: 2025-04-10

## 2025-04-07 RX ORDER — APREPITANT 40 MG/1
40 CAPSULE ORAL ONCE
Status: COMPLETED | OUTPATIENT
Start: 2025-04-07 | End: 2025-04-07

## 2025-04-07 RX ADMIN — APREPITANT 40 MG: 40 CAPSULE ORAL at 12:58

## 2025-04-07 RX ADMIN — SCOPOLAMINE 1 PATCH: 1.5 PATCH, EXTENDED RELEASE TRANSDERMAL at 12:56

## 2025-04-07 RX ADMIN — PROPOFOL 175 MCG/KG/MIN: 10 INJECTION, EMULSION INTRAVENOUS at 13:45

## 2025-04-07 RX ADMIN — DEXAMETHASONE SODIUM PHOSPHATE 4 MG: 4 INJECTION, SOLUTION INTRA-ARTICULAR; INTRALESIONAL; INTRAMUSCULAR; INTRAVENOUS; SOFT TISSUE at 13:54

## 2025-04-07 RX ADMIN — FENTANYL CITRATE 50 MCG: 50 INJECTION INTRAMUSCULAR; INTRAVENOUS at 13:54

## 2025-04-07 RX ADMIN — PHENYLEPHRINE HYDROCHLORIDE 100 MCG: 10 INJECTION INTRAVENOUS at 13:56

## 2025-04-07 RX ADMIN — Medication 2 G: at 13:41

## 2025-04-07 RX ADMIN — ACETAMINOPHEN 975 MG: 325 TABLET ORAL at 12:58

## 2025-04-07 RX ADMIN — PHENYLEPHRINE HYDROCHLORIDE 100 MCG: 10 INJECTION INTRAVENOUS at 14:10

## 2025-04-07 RX ADMIN — LIDOCAINE HYDROCHLORIDE 100 MG: 20 INJECTION, SOLUTION INFILTRATION; PERINEURAL at 13:46

## 2025-04-07 RX ADMIN — FENTANYL CITRATE 50 MCG: 50 INJECTION INTRAMUSCULAR; INTRAVENOUS at 13:46

## 2025-04-07 RX ADMIN — ONDANSETRON 4 MG: 2 INJECTION INTRAMUSCULAR; INTRAVENOUS at 15:52

## 2025-04-07 RX ADMIN — PHENYLEPHRINE HYDROCHLORIDE 50 MCG: 10 INJECTION INTRAVENOUS at 14:02

## 2025-04-07 RX ADMIN — FUROSEMIDE 20 MG: 10 INJECTION, SOLUTION INTRAVENOUS at 15:54

## 2025-04-07 RX ADMIN — SODIUM CHLORIDE, SODIUM LACTATE, POTASSIUM CHLORIDE, AND CALCIUM CHLORIDE: .6; .31; .03; .02 INJECTION, SOLUTION INTRAVENOUS at 13:41

## 2025-04-07 RX ADMIN — MIDAZOLAM 2 MG: 1 INJECTION INTRAMUSCULAR; INTRAVENOUS at 13:41

## 2025-04-07 RX ADMIN — PHENYLEPHRINE HYDROCHLORIDE 0.4 MCG/KG/MIN: 10 INJECTION INTRAVENOUS at 14:10

## 2025-04-07 RX ADMIN — KETOROLAC TROMETHAMINE 15 MG: 30 INJECTION, SOLUTION INTRAMUSCULAR at 15:53

## 2025-04-07 RX ADMIN — PROPOFOL 180 MG: 10 INJECTION, EMULSION INTRAVENOUS at 13:46

## 2025-04-07 ASSESSMENT — ACTIVITIES OF DAILY LIVING (ADL)
ADLS_ACUITY_SCORE: 50
ADLS_ACUITY_SCORE: 50
ADLS_ACUITY_SCORE: 46
ADLS_ACUITY_SCORE: 50

## 2025-04-07 NOTE — ANESTHESIA POSTPROCEDURE EVALUATION
Patient: Franchesca Hyatt    Procedure: Procedure(s):  CYSTOSCOPY, LEFT RETROGRADE PYELOGRAM, LEFT URETEROSCOPY WITH LASER LITHOTRIOPSY AND BASKET REMOVAL OF STONES, STEERABLE VACUUM SUCTION LEFT, LEFT URETERAL STENT PLACEMENT       Anesthesia Type:  General    Note:  Disposition: Inpatient   Postop Pain Control: Uneventful            Sign Out: Well controlled pain   PONV: No   Neuro/Psych: Uneventful            Sign Out: Acceptable/Baseline neuro status   Airway/Respiratory: Uneventful            Sign Out: Acceptable/Baseline resp. status   CV/Hemodynamics: Uneventful            Sign Out: Acceptable CV status   Other NRE: NONE   DID A NON-ROUTINE EVENT OCCUR? No           Last vitals:  Vitals Value Taken Time   /90 04/07/25 1645   Temp 35.9  C (96.6  F) 04/07/25 1608   Pulse 60 04/07/25 1659   Resp 9 04/07/25 1659   SpO2 96 % 04/07/25 1659   Vitals shown include unfiled device data.    Electronically Signed By: Bry Cardona MD  April 7, 2025  5:00 PM

## 2025-04-07 NOTE — ANESTHESIA CARE TRANSFER NOTE
Patient: Franchesca Hyatt    Procedure: Procedure(s):  CYSTOSCOPY, LEFT RETROGRADE PYELOGRAM, LEFT URETEROSCOPY WITH LASER LITHOTRIOPSY AND BASKET REMOVAL OF STONES, STEERABLE VACUUM SUCTION LEFT, LEFT URETERAL STENT PLACEMENT       Diagnosis: Kidney stone on left side [N20.0]  Diagnosis Additional Information: No value filed.    Anesthesia Type:   General     Note:    Oropharynx: oropharynx clear of all foreign objects and spontaneously breathing  Level of Consciousness: drowsy  Oxygen Supplementation: face mask  Level of Supplemental Oxygen (L/min / FiO2): 6  Independent Airway: airway patency satisfactory and stable  Dentition: dentition unchanged  Vital Signs Stable: post-procedure vital signs reviewed and stable  Report to RN Given: handoff report given  Patient transferred to: PACU    Handoff Report: Identifed the Patient, Identified the Reponsible Provider, Reviewed the pertinent medical history, Discussed the surgical course, Reviewed Intra-OP anesthesia mangement and issues during anesthesia, Set expectations for post-procedure period and Allowed opportunity for questions and acknowledgement of understanding      Vitals:  Vitals Value Taken Time   /70 04/07/25 1607   Temp     Pulse 77 04/07/25 1610   Resp 12 04/07/25 1610   SpO2 96 % 04/07/25 1610   Vitals shown include unfiled device data.    Electronically Signed By: GAVIN Álvarez CRNA  April 7, 2025  4:11 PM

## 2025-04-07 NOTE — OP NOTE
OPERATIVE REPORT  DATE OF SURGERY: 04/07/25  LOCATION OF SURGERY: [facility] OR  PREOPERATIVE DIAGNOSIS:  (N20.0) Kidney stone on left side  (primary encounter diagnosis)  POSTOPERATIVE DIAGNOSIS: (N20.0) Kidney stone on left side  (primary encounter diagnosis)     START TIME: 2:00 PM  END TIME: 3:53 PM    PROCEDURE PERFORMED:   1. Cystoscopy  2. LEFT retrograde pyelogram  3. LEFT ureteroscopy with laser lithotripsy - MODIFIER 22  4. LEFT ureteroscopy with basketing of stones - MODIFIER 22  5. LEFT Ureteroscopy with steerable vacuum suction, 34314 - MODIFIER 22  6. LEFT JJ stent placement  7. >1hr physician fluoroscopy time      STAFF SURGEON: Winston Van MD  ANESTHESIA: General.   ESTIMATED BLOOD LOSS: 5 mL.   DRAINS AND TUBES: LEFT 6fr x 24cm ureteral stent, 16fr steven catheter  COMPLICATIONS: None.   DISPOSITION: PACU.   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  Stone dusted, fragmented, and removed with a combination of basketing and steerable vacuum suction.    MODIFIER 22 JUSTIFICATION: This is a very challenging case given the large size and location of the stone and lower pole with partial staghorn nature and partial impaction.  This required significant additional surgeon time and effort beyond that of a standard case with respect to laser lithotripsy, basket removal of stones, and steerable vacuum suction.     HISTORY OF PRESENT ILLNESS: Franchesca Hyatt is a 64 year old female who was found to have a large partial lower pole staghorn kidney stone.  She was counseled on treatment options and elected to proceed with surgery.    OPERATION PERFORMED:   Informed consent was obtained and the patient was brought to the operating room where general anesthesia was induced. The  patient was given appropriate preoperative antibiotics and positioned supine. The patient was then repositioned in dorsal lithotomy with all pressure points padded. We then performed a timeout, verifying the correct patient's site and procedure to be performed.    A 22 Mozambican cystoscope was inserted atraumatically into the bladder.  Cystoscopy was performed with no evidence of stones in the bladder.  The left ureteral orifice was identified and cannulated with an 0.035 sensor wire which was advanced up to the renal pelvis under fluoroscopic guidance and the cystoscope was removed.  The stone was identified occupying the lower pole of the kidney.  Semirigid ureteroscope was assembled and inserted into the bladder.  Using a Super Stiff wire the ureteral orifice was cannulated and ureteroscope was able to easily advance up to the UPJ.  A gentle retrograde pyelogram was performed outlining the collecting system.  The Super Stiff wire was left in place and the semirigid scope was removed.  A 12-14 Mozambican by 36 cm ureteral access sheath was advanced over the stiff wire to the proximal ureter/UPJ and the inner stylette and wire were removed.  The flexible steerable vacuum suction ureteroscope, CVAC, was advanced into the renal pelvis and the stone was identified occupying the entire lower pole of the kidney.  Stone was dusted and fragmented with laser lithotripsy and this was very challenging given the location of the stone in the lower pole challenging angulation ureteroscope.  This required significant additional certain time and effort began out of standard case, modifier 22.  Stone fragments were then removed with a combination of basketing and this was challenging given the large volume of stone burden and required greater than 40 basket retrieval's, modifier 22.  Stone dust was also removed with steerable vacuum suction and this was challenging given the stone and location in the lower pole, modifier 22.  Ultimately  all visible stone fragments greater than 2 mm were removed.  Ureteroscope and access sheath were removed en bloc with no evidence of ureteral injury.  A 6 [place] Arabian by 24 cm JJ ureteral stent was advanced over the wire with good curl noted in the renal pelvis and in the bladder fluoroscopically a 16 [place] Song catheter was placed with 10 cc in the balloon.  She received 15 mg IV Toradol and 20 mg IV Lasix.  She was emerged anesthesia and taken to the recovery room in stable condition.    Plan:  - Will plan for follow-up with me as scheduled on 4/16/2025 with a CT scan prior to assess for any residual stone burden prior to stent removal    Winston aVn MD   Urology  [facility]  Clinic Phone [phone]

## 2025-04-07 NOTE — ANESTHESIA PROCEDURE NOTES
Airway       Patient location during procedure: OR  Staff -        CRNA: Aileen Soliman APRN CRNA       Performed By: CRNA  Consent for Airway        Urgency: elective  Indications and Patient Condition       Indications for airway management: pia-procedural       Induction type:intravenous       Mask difficulty assessment: 0 - not attempted    Final Airway Details       Final airway type: supraglottic airway    Supraglottic Airway Details        Type: LMA       Brand: I-Gel       LMA size: 4    Post intubation assessment        Placement verified by: capnometry, equal breath sounds and chest rise        Number of attempts at approach: 1       Number of other approaches attempted: 0       Secured with: tape       Ease of procedure: easy       Dentition: Intact and Unchanged

## 2025-04-07 NOTE — DISCHARGE INSTRUCTIONS
POSTOPERATIVE INSTRUCTIONS    Diagnosis-------------------------------   Left kidney stone    Procedure-------------------------------  Procedure(s) (LRB):  CYSTOSCOPY, LEFT RETROGRADE PYELOGRAM, LEFT URETEROSCOPY WITH LASER LITHOTRIOPSY AND BASKET REMOVAL OF STONES, STEERABLE VACUUM SUCTION LEFT, LEFT URETERAL STENT PLACEMENT (Left)      Findings--------------------------------  Large left lower pole kidney stone    Home-going instructions-----------------         Activity Limitation:     - No driving or operating heavy machinery while on narcotic pain medication.     FOLLOW THESE INSTRUCTIONS AS INDICATED BELOW:  - Observe operative area for signs of excessive bleeding.  - You may shower.  - Increase fluid intake to promote clear urine.  - Resume usual diet as tolerated    What to expect while recovering-----------  - You may experience some intermittent bleeding that makes your urine pink or cherry colored. This is normal.  - However, if you are unable to urinate, passing large amount of clots, have batsheva blood in your urine, or have a temperature >101 degrees, call the urology nurse on call, or present to your nearest emergency department.  - You are encouraged to walk daily, and have no activity restrictions.   - A URETERAL STENT has been placed that allows urine to flow unobstructed from your kidney into your bladder.  The stent has a curl in the kidney and a curl in the bladder.  The curl in the bladder can cause some urgency and frequency of urination as well as some mild blood in the urine.  The curl in the kidney can cause some mild flank discomfort.  This may be more noticeable when you urinate.  A URETERAL STENT is meant to be left in temporarily.  It must be removed or changed no later than 3 months after it's insertion.  If it's not removed it can result in stone overgrowth on the stent that can cause pain, infection, and can be very difficult to remove.      Discharge Medications/instructions:   -  Flomax (tamsulosin) to be taken daily until stent is removed  - Antibiotics will only be prescribed if needed  - Take Tylenol 1000mg every 6 hours for pain  - Take Toradol 10mg every 6 hours as needed for additional pain control  - Take Oxycodone 5mg every 4-6 hours only for break through pain  - Take Colace while taking Oxycodone to prevent constipation  -CT scan ordered for completion prior to stent removal to assess for any residual stone burden      Questions/concerns------------------------  [facility]: [phone]    Future appointments  Follow-up scheduled on 4/16/2025 with Dr. Carlota Van MD         Today you were given 975 mg of Tylenol at 1 pm. The recommended daily maximum dose is 4000 mg.      Today you received Toradol, an antiinflammatory medication similar to Ibuprofen.  You should not take other antiinflammatory medication, such as Ibuprofen, Motrin, Advil, Aleve, Naprosyn, etc until 12 mn.      You were given Emend, a medicine to prevent nausea.        Same Day Surgery Discharge Instructions for  Sedation and General Anesthesia     It's not unusual to feel dizzy, light-headed or faint for up to 24 hours after surgery or while taking pain medication.  If you have these symptoms: sit for a few minutes before standing and have someone assist you when you get up to walk or use the bathroom.    You should rest and relax for the next 24 hours. We recommend you make arrangements to have an adult stay with you for at least 24 hours after your discharge.  Avoid hazardous and strenuous activity.    DO NOT DRIVE any vehicle or operate mechanical equipment for 24 hours following the end of your surgery.  Even though you may feel normal, your reactions may be affected by the medication you have received.    Do not drink alcoholic beverages for 24 hours following surgery.     Slowly progress to your regular diet as you feel able. It's not unusual to feel nauseated and/or vomit  after receiving anesthesia.  If you develop these symptoms, drink clear liquids (apple juice, ginger ale, broth, 7-up, etc. ) until you feel better.  If your nausea and vomiting persists for 24 hours, please notify your surgeon.      All narcotic pain medications, along with inactivity and anesthesia, can cause constipation. Drinking plenty of liquids and increasing fiber intake will help.    For any questions of a medical nature, call your surgeon.    Do not make important decisions for 24 hours.    If you had general anesthesia, you may have a sore throat for a couple of days related to the breathing tube used during surgery.  You may use Cepacol lozenges to help with this discomfort.  If it worsens or if you develop a fever, contact your surgeon.     If you feel your pain is not well managed with the pain medications prescribed by your surgeon, please contact your surgeon's office to let them know so they can address your concerns.      **If you have questions or concerns about your procedure,   call Dr. Van at [phone]**

## 2025-04-07 NOTE — INTERVAL H&P NOTE
"I have reviewed the surgical (or preoperative) H&P that is linked to this encounter, and examined the patient. There are no significant changes    Clinical Conditions Present on Arrival:  Clinically Significant Risk Factors Present on Admission                       # Obesity: Estimated body mass index is 31.22 kg/m  as calculated from the following:    Height as of 3/28/25: 1.626 m (5' 4\").    Weight as of 3/28/25: 82.5 kg (181 lb 14.4 oz).       "

## 2025-04-07 NOTE — ANESTHESIA PREPROCEDURE EVALUATION
Anesthesia Pre-Procedure Evaluation    Patient: Franchesca Hyatt   MRN: [id number] : 1960        Procedure : Procedure(s):  CYSTOSCOPY, LEFT RETROGRADE PYELOGRAM, LEFT URETEROSCOPY WITH LASER LITHOTRIOPSY AND BASKET REMOVAL OF STONES, STEERABLE VACUUM SUCTION LEFT, LEFT URETERAL STENT PLACEMENT          Past Medical History:   Diagnosis Date    Calculus, kidney     CKD (chronic kidney disease) stage 3, GFR 30-59 ml/min (H)     Hypercholesteremia 2018    Hypertension goal BP (blood pressure) < 140/90 2012    Seasonal allergic rhinitis     seasonal allergies, cats    Tubular adenoma of colon 10/07/2014    Colonoscopy 10/1/2014, repeat 2019    Urinary tract infection, site not specified  to early     Past history of       Past Surgical History:   Procedure Laterality Date    BIOPSY BREAST      X 2    COLONOSCOPY  6/15/2009    Polyp - Due in 5 years    CYSTOSCOPY, RETROGRADES, MANIPULATE STONE, INSERT STENT, COMBINED      EXCISE PILONIDAL CYST, EXTENSIVE      LASER REVOLIX LITHOTRIPSY URETER(S), INSERT STENT, COMBINED      Sebaceous Cyst Removal      Back      Allergies   Allergen Reactions    Latex Hives and Itching    Seasonal Allergies     Vicodin [Hydrocodone-Acetaminophen] Other (See Comments)     Nightmares        Social History     Tobacco Use    Smoking status: Former     Current packs/day: 0.00     Types: Cigarettes     Start date: 1986     Quit date: 3/12/1991     Years since quittin.0    Smokeless tobacco: Never    Tobacco comments:     Was a light smoker for a few years in my youth.   Substance Use Topics    Alcohol use: Yes     Comment: rare - one glass per week of wine      Wt Readings from Last 1 Encounters:   25 83.1 kg (183 lb 1.6 oz)        Anesthesia Evaluation   Pt has had prior anesthetic.     History of anesthetic complications  - PONV.      ROS/MED HX  ENT/Pulmonary:     (+)           allergic rhinitis,                              Neurologic:    (-) migraines   Cardiovascular:     (+) Dyslipidemia hypertension- -   -  - -                                 Previous cardiac testing   Echo: Date: 08/2023 Results:  Normal dobutamine stress echocardiogram without evidence of inducible  ischemia.     Target heart rate was achieved. Heart rate and blood pressure response to  dobutamine were normal. No regional wall motion abnormalities at rest. With  stress, the left ventricular ejection fraction increased from 55-60% to  greater than 65% and the left ventricular size decreased appropriately.  No subjective symptoms to suggest ischemia.  There was no ECG evidence of ischemia.     Screening echocardiogram is with normal biventricular function. There is trace  MR and TR. The aortic root and visualized ascending aorta are normal.    Stress Test:  Date: Results:    ECG Reviewed:  Date: Results:    Cath:  Date: Results:      METS/Exercise Tolerance: 3 - Able to walk 1-2 blocks without stopping    Hematologic:       Musculoskeletal:       GI/Hepatic:    (-) GERD   Renal/Genitourinary:     (+) renal disease, type: CRI, Pt does not require dialysis,    Nephrolithiasis ,       Endo:     (+)               Obesity,    (-) Type II DM   Psychiatric/Substance Use:       Infectious Disease:       Malignancy:       Other:            Physical Exam    Airway        Mallampati: II   TM distance: > 3 FB   Neck ROM: full   Mouth opening: > 3 cm    Respiratory Devices and Support         Dental       (+) Modest Abnormalities - crowns, retainers, 1 or 2 missing teeth      Cardiovascular   cardiovascular exam normal          Pulmonary   pulmonary exam normal                OUTSIDE LABS:  CBC:   Lab Results   Component Value Date    WBC 6.4 03/24/2025    WBC 4.5 01/18/2024    HGB 15.0 03/24/2025    HGB 14.0 01/18/2024    HCT 45.5 03/24/2025    HCT 42.8 01/18/2024     03/24/2025     01/18/2024     BMP:   Lab Results   Component Value Date     03/24/2025  "    01/18/2024    POTASSIUM 3.8 03/24/2025    POTASSIUM 4.0 01/18/2024    CHLORIDE 101 03/24/2025    CHLORIDE 103 01/18/2024    CO2 22 03/24/2025    CO2 26 01/18/2024    BUN 18.0 03/24/2025    BUN 14.3 01/18/2024    CR 0.96 (H) 03/24/2025    CR 1.04 (H) 01/18/2024     (H) 03/24/2025     (H) 01/18/2024     COAGS:   Lab Results   Component Value Date    PTT 28 07/13/2007    INR 1.02 03/28/2012     POC:   Lab Results   Component Value Date    HCG negative 03/28/2012     HEPATIC:   Lab Results   Component Value Date    ALBUMIN 4.7 03/24/2025    PROTTOTAL 7.1 03/24/2025    ALT 20 03/24/2025    AST 18 03/24/2025    ALKPHOS 57 03/24/2025    BILITOTAL 1.7 (H) 03/24/2025     OTHER:   Lab Results   Component Value Date    A1C 6.1 (H) 03/24/2025    JUSTO 9.7 03/24/2025    PHOS 2.9 08/09/2011    MAG 2.1 06/21/2007    LIPASE 148 03/10/2022    TSH 1.45 08/23/2023       Anesthesia Plan    ASA Status:  2    NPO Status:  NPO Appropriate    Anesthesia Type: General.     - Airway: LMA   Induction: Propofol.   Maintenance: TIVA.        Consents    Anesthesia Plan(s) and associated risks, benefits, and realistic alternatives discussed. Questions answered and patient/representative(s) expressed understanding.     - Discussed: Risks, Benefits and Alternatives for BOTH SEDATION and the PROCEDURE were discussed     - Discussed with:  Patient            Postoperative Care    Pain management: IV analgesics, Oral pain medications, Multi-modal analgesia.   PONV prophylaxis: Ondansetron (or other 5HT-3), Dexamethasone or Solumedrol, Background Propofol Infusion, Scopolamine patch, Aprepitant     Comments:               Ross Cullen MD    Clinically Significant Risk Factors Present on Admission                   # Hypertension: Noted on problem list           # Obesity: Estimated body mass index is 30.47 kg/m  as calculated from the following:    Height as of this encounter: 1.651 m (5' 5\").    Weight as of this encounter: " 83.1 kg (183 lb 1.6 oz).

## 2025-04-12 LAB
APPEARANCE STONE: NORMAL
COMPN STONE: NORMAL
SPECIMEN WT: 314 MG

## 2025-04-16 ENCOUNTER — HOSPITAL ENCOUNTER (OUTPATIENT)
Dept: CT IMAGING | Facility: CLINIC | Age: 65
Discharge: HOME | End: 2025-04-16
Attending: UROLOGY
Payer: COMMERCIAL

## 2025-04-16 ENCOUNTER — OFFICE VISIT (OUTPATIENT)
Dept: UROLOGY | Facility: CLINIC | Age: 65
End: 2025-04-16
Payer: COMMERCIAL

## 2025-04-16 VITALS
DIASTOLIC BLOOD PRESSURE: 97 MMHG | WEIGHT: 175 LBS | HEART RATE: 75 BPM | BODY MASS INDEX: 29.16 KG/M2 | SYSTOLIC BLOOD PRESSURE: 150 MMHG | OXYGEN SATURATION: 97 % | HEIGHT: 65 IN

## 2025-04-16 DIAGNOSIS — N20.0 KIDNEY STONE ON LEFT SIDE: Primary | ICD-10-CM

## 2025-04-16 DIAGNOSIS — N20.0 KIDNEY STONE ON LEFT SIDE: ICD-10-CM

## 2025-04-16 PROCEDURE — 74176 CT ABD & PELVIS W/O CONTRAST: CPT | Mod: 26 | Performed by: STUDENT IN AN ORGANIZED HEALTH CARE EDUCATION/TRAINING PROGRAM

## 2025-04-16 PROCEDURE — 99214 OFFICE O/P EST MOD 30 MIN: CPT | Mod: 25 | Performed by: UROLOGY

## 2025-04-16 PROCEDURE — 52310 CYSTOSCOPY AND TREATMENT: CPT | Performed by: UROLOGY

## 2025-04-16 PROCEDURE — 74176 CT ABD & PELVIS W/O CONTRAST: CPT

## 2025-04-16 PROCEDURE — 1126F AMNT PAIN NOTED NONE PRSNT: CPT | Performed by: UROLOGY

## 2025-04-16 PROCEDURE — 3077F SYST BP >= 140 MM HG: CPT | Performed by: UROLOGY

## 2025-04-16 PROCEDURE — 3080F DIAST BP >= 90 MM HG: CPT | Performed by: UROLOGY

## 2025-04-16 ASSESSMENT — PAIN SCALES - GENERAL: PAINLEVEL_OUTOF10: NO PAIN (0)

## 2025-04-16 NOTE — PROGRESS NOTES
"[facility]   CHIEF COMPLAINT   It was my pleasure to see Franchesca Hyatt who is a 64 year old female for follow-up of LEFT kidney stone, partial staghorn.      HPI   Franchesca Hyatt is a very pleasant 64 year old female    Initially seen by Miguel Angel Blanton - 4/1/2025:  Franchesca Hyatt is a 64 year old female with CKDA 3a, KTN, HLD, 1 cm x 7 mm left ureteropelvic junction stone requiring URS in 2007, here for follow-up on large inferior L renal stone seen on CT 3/24/25.  Denies gross hematuria or dysuria. Reports occasional R flank pain. Notes occasional urinary urgency. Has a history of MSK back pain. No recent UTIs.     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 Stone dusted, fragmented, and removed with a combination of basketing and steerable vacuum suction.     TODAY 4/16/2025:  Return today to review her CT scan and discuss possible stent removal  She has been having some discomfort from the stent and started feeling better yesterday  She does have a celebration of life event that she is leading in a week and a half and is also concerned about her aunt's health    PHYSICAL EXAM  Patient is a 64 year old  female   Vitals: Blood pressure (!) 150/97, pulse 75, height 1.651 m (5' 5\"), weight 79.4 kg (175 lb), last menstrual period 06/08/2014, SpO2 97%, not currently breastfeeding.  Body mass index is 29.12 kg/m .  General Appearance Adult:   Alert, no acute distress, oriented  HENT: throat/mouth:normal, good dentition  Lungs: no respiratory distress, or pursed lip breathing  Heart: No obvious jugular venous distension present  Neuro: Alert, oriented, speech and mentation normal  Psych: affect and mood normal  Gait: Normal    UA RESULTS:  Recent Labs   Lab Test 03/24/25  1015   COLOR Yellow   APPEARANCE Clear   URINEGLC Negative   URINEBILI Negative   URINEKETONE Negative   SG <=1.005   UBLD Small*   URINEPH 5.5   PROTEIN Negative   UROBILINOGEN 0.2   NITRITE Negative   LEUKEST " Large*   RBCU 0-2   WBCU 25-50*        Stone Composition See Note SEE NOTE CM    Comment: Calculi composed primarily of:  30% calcium oxalate (monohydrate and dihydrate), and  70% uric acid.     IMAGING:  All pertinent imaging reviewed:    All imaging studies reviewed by me.  I personally reviewed these imaging films.  A formal report from radiology will follow.    FINDINGS:     Lower thorax: Normal.     Liver: No mass. No intrahepatic biliary ductal dilation.     Biliary System: Cholelithiasis without evidence of cholecystitis.     Spleen: Normal.     Pancreas: No mass or pancreatic ductal dilation.     Adrenal glands: No mass or nodules     Kidneys: Left ureteral stent is in place. Unchanged cortical scarring  in the left kidney. Residual 8 mm calculus in the left lower pole,  previously 20 mm. Unremarkable right kidney. No hydronephrosis.     Gastrointestinal tract :Normal appendix. Normal caliber small bowel.     Retroperitoneum: No aneurysmal dilatation. No significant  lymphadenopathy.     Pelvis: Urinary bladder is nondistended limiting evaluation. Uterus  and adnexa within normal limits.     Osseous structures: No aggressive or acute osseous lesion.  Multilevel  degenerative changes of the visualized spine.      Soft tissues: Within normal limits.                                                                      IMPRESSION:  1.  Left ureteral stent is in place. Residual left lower pole 8 mm  renal calculus. No hydronephrosis.       ASSESSMENT and PLAN  64-year-old female with history of left partial staghorn lower pole kidney stone now status post ureteroscopy with CVAC, CT scan with a residual lower pole stone    Left kidney stone  - Reviewed her updated CT scan and reviewed these images personally.  Compared these with her prior CT scan and shared the images with the patient and her significant other  - There is a residual 8 mm stone in the left lower pole of the kidney  - We discussed options would be  for stent removal with observation and a repeat CT scan in 1 year given the uric acid predominance versus maintaining the stent at this time and planning for ureteroscopy in the near future  - She would like to have the stent removed today  - Follow-up in 1 year with a CT scan prior      Time spent: 20 minutes spent on the date of the encounter doing chart review, history and exam, documentation and further activities as noted above.  This was in addition to cystoscopy time    Winston Van MD   Urology  [facility]  [facility] Phone: [phone]  [facility] Phone: [phone]

## 2025-04-16 NOTE — NURSING NOTE
Chief Complaint   Patient presents with    hx stones     Here in clinic for a cystoscopy with stent removal    Prior to the start of the procedure and with procedural staff participation, I verbally confirmed the patient s identity using two indicators, relevant allergies, that the procedure was appropriate and matched the consent or emergent situation, and that the correct equipment/implants were available. Immediately prior to starting the procedure I conducted the Time Out with the procedural staff and re-confirmed the patient s name, procedure, and site/side. I have wiped the patient off with the povidone-Iodine solution, draped them and instilled sterile water into the bladder. (The Joint Commission universal protocol was followed.)  Yes    Sedation (Moderate or Deep): None   Carla Lemus LPN
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
## 2025-04-16 NOTE — PROCEDURES
CYSTOSCOPY AND URETERAL STENT REMOVAL PROCEDURE NOTE:    Franchesca Hyatt is a 64 year old female who presents with ureteral stent  for a cystoscopy and ureteral stent removal.    Pt ID verified with patient: Yes     Procedure verified with patient: Yes     Procedure confirmed with physician and support staff: Yes     Consent confirmed with physician and support staff.    Sign In:  History and Physical Exam reviewed  Primary Diagnosis: ureteral stent   Informed Consent Discussed: Yes   Sign in Communication: Yes   Time Out:  Team Confirms the Correct Patient, Correct Procedure; Yes , Correct Site and Site Marking, Correct Position (if applicable).  Affirmation of Time Out: Yes   Sign Out:  Sign Out Discussion: Yes     Franchesca Hyatt is a 64 year old female with an indwelling ureteral stent in need of removal.  Yeah I do not know maybe    CYSTOSCOPY PROCEDURE:  After sterile preparation and draping of the patient,  a 17-Swedish flexible cystoscope was introduced via the urethra.  It was passed without difficulty into the bladder.  The urethra was open without evidence of stricture.  The ureteral orifices were orthotopic.  The double J stent was seen coming out the left side.  It was grasped with an alligator forceps and extracted intact without difficulty.  The patient tolerated the procedure well    A/P Successful stent removal  Prophylactic antibiotic ordered   Stone prevention counseling provided today    Watch for any new onset fevers, signs of UTI.  May expect some pain after removal.  If this is severe, or last many hours, you may need to return for replacement of stent.    Winston Van MD   Urology  [facility]   Winston Van MD

## 2025-04-16 NOTE — PATIENT INSTRUCTIONS
"AFTER YOUR CYSTOSCOPY  ?  ?  You have just completed a cystoscopy, or \"cysto\", which allowed your physician to learn more about your bladder (or to remove a stent placed after surgery). We suggest that you continue to avoid caffeine, fruit juice, and alcohol for the next 24 hours, however, you are encouraged to return to your normal activities.  ?  ?  A few things that are considered normal after your cystoscopy:  ?  * small amount of bleeding (or spotting) that clears within the next 24 hours  ?  * slight burning sensation with urination  ?  * sensation of needing to void (urinate) more frequently  ?  * the feeling of \"air\" in your urine  ?  * mild discomfort that is relieved with Tylenol    * bladder spasms  ?  ?  ?  Please contact our office promptly if you:  ?  * develop a fever above 101 degrees  ?  * are unable to urinate  ?  * develop bright red blood that does not stop  ?  * experience severe pain or swelling  ?  ?  ?  And of course, please contact our office with any concerns or questions [phone].  ?   AFTER YOUR CYSTOSCOPY        You have just completed a cystoscopy, or \"cysto\", which allowed your physician to learn more about your bladder (or to remove a stent placed after surgery). We suggest that you continue to avoid caffeine, fruit juice, and alcohol for the next 24 hours, however, you are encouraged to return to your normal activities.         A few things that are considered normal after your cystoscopy:     * Small amount of bleeding (or spotting) that clears within the next 24 hours     * Slight burning sensation with urination     * Sensation to of needing to avoid more frequently     * The feeling of \"air\" in your urine     * Mild discomfort that is relieved with Tylenol        Please contact our office promptly if you:     * Develop a fever above 101 degrees     * Are unable to urinate     * Develop bright red blood that does not stop     * Severe pain or swelling         Please contact " our office with any concerns or questions @[email or web address].

## 2025-04-17 ENCOUNTER — TELEPHONE (OUTPATIENT)
Dept: GASTROENTEROLOGY | Facility: CLINIC | Age: 65
End: 2025-04-17
Payer: COMMERCIAL

## 2025-04-17 NOTE — TELEPHONE ENCOUNTER
Caller: Franchesca    Reason for Reschedule/Cancellation (please be detailed, any staff messages or encounters to note?):   Patient just had surgery-needs to postpone    Did you cancel or rescheduled an EUS procedure? No.    Is screening questionnaire older than 3 months from the reschedule date.   If Yes, please complete screening questionnaire. No    Prior to reschedule please review:  Ordering Provider: Allan  Sedation Determined: MAC  Does patient have any ASC Exclusions, please identify?: N    Notes on Cancelled Procedure:  Procedure: Lower Endoscopy [Colonoscopy]   Date: 5/6/25  Location: [facility]; [address] [place] [address], [address] [place] [address]  Surgeon: Chelsie    Rescheduled: Yes,   Procedure: Lower Endoscopy [Colonoscopy]    Date: 7/24/25   Location: [facility]; [address] [place] [address], [address] [place] [address]   Surgeon:  -patient requested  Sedation Level Scheduled  MAC ,  Reason for Sedation Level Per Order   Instructions updated and sent: Yes     Does patient need PAC or Pre -Op Rescheduled? : N

## 2025-06-28 ENCOUNTER — LAB (OUTPATIENT)
Dept: LAB | Facility: CLINIC | Age: 65
End: 2025-06-28
Payer: COMMERCIAL

## 2025-06-28 DIAGNOSIS — R39.89 SUSPECTED URINARY TRACT INFECTION: ICD-10-CM

## 2025-06-28 DIAGNOSIS — Z13.6 SCREENING FOR CARDIOVASCULAR CONDITION: Primary | ICD-10-CM

## 2025-06-28 DIAGNOSIS — E78.5 HYPERLIPIDEMIA LDL GOAL <130: ICD-10-CM

## 2025-06-28 LAB
ALBUMIN UR-MCNC: NEGATIVE MG/DL
APPEARANCE UR: CLEAR
BILIRUB UR QL STRIP: NEGATIVE
COLOR UR AUTO: YELLOW
GLUCOSE UR STRIP-MCNC: NEGATIVE MG/DL
HGB UR QL STRIP: ABNORMAL
KETONES UR STRIP-MCNC: NEGATIVE MG/DL
LEUKOCYTE ESTERASE UR QL STRIP: ABNORMAL
NITRATE UR QL: NEGATIVE
PH UR STRIP: 5.5 [PH] (ref 5–7)
SP GR UR STRIP: 1.01 (ref 1–1.03)
UROBILINOGEN UR STRIP-ACNC: 0.2 E.U./DL

## 2025-06-28 PROCEDURE — 81003 URINALYSIS AUTO W/O SCOPE: CPT | Mod: QW

## 2025-06-28 PROCEDURE — 87086 URINE CULTURE/COLONY COUNT: CPT

## 2025-06-29 LAB — BACTERIA UR CULT: NORMAL

## 2025-06-29 NOTE — PROGRESS NOTES
"S:patient admitted for chest pain. Patient workup neg so far. Patient currently resting. Planning for dobutamine stress this am.  O:/84 (BP Location: Left arm)   Pulse 56   Temp 98.3  F (36.8  C) (Oral)   Resp 18   Ht 1.626 m (5' 4\")   Wt 96.6 kg (212 lb 15.4 oz)   LMP 06/08/2014   SpO2 98%   BMI 36.56 kg/m    Patient without chest pain. Exam neg. Lung cta. Neuro. nonfocal    Results for orders placed or performed during the hospital encounter of 08/23/23   XR Chest 2 Views     Status: None    Narrative    CHEST TWO VIEWS 8/23/2023 11:53 AM     HISTORY: Chest pain, shortness of breath.    COMPARISON: Chest radiograph 3/28/2012.      Impression    IMPRESSION: No focal consolidation, pleural effusion or pneumothorax.  Cardiomediastinal silhouette is unremarkable. Since 2012, similar tiny  radiodensity projecting over the left lower hemithorax, probably  within the left breast.    CARLOS WEBSTER MD         SYSTEM ID:  [id number]   D dimer quantitative     Status: Normal   Result Value Ref Range    D-Dimer Quantitative <0.27 0.00 - 0.50 ug/mL FEU    Narrative    This D-dimer assay is intended for use in conjunction with a clinical pretest probability assessment model to exclude pulmonary embolism (PE) and deep venous thrombosis (DVT) in outpatients suspected of PE or DVT. The cut-off value is 0.50 ug/mL FEU.   Comprehensive metabolic panel     Status: Abnormal   Result Value Ref Range    Sodium 141 136 - 145 mmol/L    Potassium 3.8 3.4 - 5.3 mmol/L    Chloride 103 98 - 107 mmol/L    Carbon Dioxide (CO2) 24 22 - 29 mmol/L    Anion Gap 14 7 - 15 mmol/L    Urea Nitrogen 11.5 8.0 - 23.0 mg/dL    Creatinine 1.00 (H) 0.51 - 0.95 mg/dL    Calcium 10.0 8.8 - 10.2 mg/dL    Glucose 161 (H) 70 - 99 mg/dL    Alkaline Phosphatase 61 35 - 104 U/L    AST 20 0 - 45 U/L    ALT 23 0 - 50 U/L    Protein Total 6.9 6.4 - 8.3 g/dL    Albumin 4.6 3.5 - 5.2 g/dL    Bilirubin Total 1.6 (H) <=1.2 mg/dL    GFR Estimate 64 >60 " mL/min/1.73m2   Troponin T, High Sensitivity     Status: Normal   Result Value Ref Range    Troponin T, High Sensitivity 7 <=14 ng/L   TSH with free T4 reflex     Status: Normal   Result Value Ref Range    TSH 1.45 0.30 - 4.20 uIU/mL   Nt probnp inpatient (BNP)     Status: Normal   Result Value Ref Range    N terminal Pro BNP Inpatient 85 0 - 900 pg/mL   CBC with platelets and differential     Status: Abnormal   Result Value Ref Range    WBC Count 5.1 4.0 - 11.0 10e3/uL    RBC Count 4.94 3.80 - 5.20 10e6/uL    Hemoglobin 15.8 (H) 11.7 - 15.7 g/dL    Hematocrit 45.7 35.0 - 47.0 %    MCV 93 78 - 100 fL    MCH 32.0 26.5 - 33.0 pg    MCHC 34.6 31.5 - 36.5 g/dL    RDW 12.1 10.0 - 15.0 %    Platelet Count 218 150 - 450 10e3/uL    % Neutrophils 71 %    % Lymphocytes 22 %    % Monocytes 5 %    % Eosinophils 1 %    % Basophils 0 %    % Immature Granulocytes 1 %    NRBCs per 100 WBC 0 <1 /100    Absolute Neutrophils 3.6 1.6 - 8.3 10e3/uL    Absolute Lymphocytes 1.1 0.8 - 5.3 10e3/uL    Absolute Monocytes 0.3 0.0 - 1.3 10e3/uL    Absolute Eosinophils 0.0 0.0 - 0.7 10e3/uL    Absolute Basophils 0.0 0.0 - 0.2 10e3/uL    Absolute Immature Granulocytes 0.0 <=0.4 10e3/uL    Absolute NRBCs 0.0 10e3/uL   iStat Troponin, POCT     Status: Normal   Result Value Ref Range    TROPPC POCT 0.00 <=0.12 ug/L   Troponin T, High Sensitivity     Status: Normal   Result Value Ref Range    Troponin T, High Sensitivity <6 <=14 ng/L   Troponin T, High Sensitivity     Status: Normal   Result Value Ref Range    Troponin T, High Sensitivity 8 <=14 ng/L   Troponin T, High Sensitivity     Status: Normal   Result Value Ref Range    Troponin T, High Sensitivity 9 <=14 ng/L   EKG 12 lead     Status: None   Result Value Ref Range    Systolic Blood Pressure  mmHg    Diastolic Blood Pressure  mmHg    Ventricular Rate 94 BPM    Atrial Rate 94 BPM    CA Interval 154 ms    QRS Duration 88 ms     ms    QTc 450 ms    P Axis 69 degrees    R AXIS -1 degrees     T Axis 46 degrees    Interpretation ECG       Sinus rhythm  Right atrial enlargement  Cannot rule out Inferior infarct , age undetermined  Abnormal ECG    Unconfirmed report - interpretation of this ECG is computer generated - see medical record for final interpretation  Confirmed by - EMERGENCY ROOM, PHYSICIAN ([id number]),  Popeye Charles ([id number]) on 8/23/2023 2:36:00 PM     CBC with platelets differential     Status: Abnormal    Narrative    The following orders were created for panel order CBC with platelets differential.  Procedure                               Abnormality         Status                     ---------                               -----------         ------                     CBC with platelets and d...[[id number]]  Abnormal            Final result                 Please view results for these tests on the individual orders.       A:CP resolved  P:dobutamine stress and eval.     Satisfactory

## 2025-06-30 ENCOUNTER — TELEPHONE (OUTPATIENT)
Dept: UROLOGY | Facility: CLINIC | Age: 65
End: 2025-06-30
Payer: COMMERCIAL

## 2025-06-30 NOTE — TELEPHONE ENCOUNTER
Pt's UA and UC were negative for UTI. She states that she also has chronic low back issues. So, she is going to push fluids just incase it is a stone fragment causing the pain and going to follow up with her PCP about possible spine pain.  No further questions or needs at this time.

## 2025-07-03 ENCOUNTER — TELEPHONE (OUTPATIENT)
Dept: UROLOGY | Facility: CLINIC | Age: 65
End: 2025-07-03
Payer: COMMERCIAL

## 2025-07-03 NOTE — TELEPHONE ENCOUNTER
Patient calling into nurse triage stating that she's having left mild dull flank pain.  CT from 4/16/25 shows a residual 8 mm stone in the left lower pole of kidney.  Virtual visit scheduled with Dr. Van on 7/11/25 to discuss possible ureteroscopy.  Patient states understanding.    Luana Cartwright RN, BSN  Urology Care Coordinator  [facility]  [phone]

## 2025-07-08 ENCOUNTER — TELEPHONE (OUTPATIENT)
Dept: GASTROENTEROLOGY | Facility: CLINIC | Age: 65
End: 2025-07-08
Payer: COMMERCIAL

## 2025-07-08 NOTE — TELEPHONE ENCOUNTER
Rescheduled Colonoscopy  Due to recent surgery, needed to postpone    Pre visit planning completed.      Procedure details:    Patient scheduled for Colonoscopy on 7/24/25.     Arrival time: 0630. Procedure time 0730    Facility location: [facility]; [address] [place] [address], [address] Floor, [place] [address]. Check in location: 5th Floor.    Sedation type: MAC    Pre op exam needed? No.    Indication for procedure: history of polyps      Chart review:     Electronic implanted devices? No    Recent diagnosis of diverticulitis within the last 6 weeks? No      Medication review:    Diabetic? No    Anticoagulants? No    Weight loss medication/injectable? No GLP-1 medication per patient's medication list. Nursing to verify with pre-assessment call.    Other medication HOLDING recommendations:  N/A      Prep for procedure:     Bowel prep recommendation: Standard Golytely. Bowel prep sent to [id number] - [place], [place] - [address] AT [facility] [address]  Due to: CKD noted    Prep was sent in February and picked up by patient. Does she still have it?    Prep instructions sent via jose Lai RN  Endoscopy Procedure Pre Assessment   [phone] option 3

## 2025-07-09 NOTE — TELEPHONE ENCOUNTER
Pre assessment completed for upcoming procedure.   (Please see previous telephone encounter notes for complete details)    Patient returned call.       Procedure details:    Procedure date 7/24/25, arrival time 0630 and facility location reviewed.    Pre op exam needed? No.    Designated  policy reviewed. Instructed to have someone stay 24  hours post procedure.       Medication review:    Medications reviewed. Please see supporting documentation below. Holding recommendations discussed (if applicable).       Prep for procedure:     Procedure prep instructions reviewed.      Pt has picked up prep previously.   Any additional information needed:  N/A      Patient verbalized understanding and had no questions or concerns at this time.      Emiliana Jonas RN  Endoscopy Procedure Pre Assessment   [phone] option 3

## 2025-07-09 NOTE — TELEPHONE ENCOUNTER
Attempted to contact patient in order to complete pre assessment questions.     No answer. Left message to return call to [phone] option 3    Callback communication sent via [employer].      Aurora Del Rio LPN  Endoscopy Procedure Pre Assessment

## 2025-07-24 ENCOUNTER — HOSPITAL ENCOUNTER (OUTPATIENT)
Facility: AMBULATORY SURGERY CENTER | Age: 65
Discharge: HOME | End: 2025-07-24
Attending: INTERNAL MEDICINE
Payer: COMMERCIAL

## 2025-07-24 ENCOUNTER — ANESTHESIA (OUTPATIENT)
Dept: SURGERY | Facility: AMBULATORY SURGERY CENTER | Age: 65
End: 2025-07-24
Payer: COMMERCIAL

## 2025-07-24 ENCOUNTER — ANESTHESIA EVENT (OUTPATIENT)
Dept: SURGERY | Facility: AMBULATORY SURGERY CENTER | Age: 65
End: 2025-07-24
Payer: COMMERCIAL

## 2025-07-24 VITALS
HEIGHT: 65 IN | TEMPERATURE: 97 F | BODY MASS INDEX: 29.16 KG/M2 | RESPIRATION RATE: 16 BRPM | DIASTOLIC BLOOD PRESSURE: 75 MMHG | HEART RATE: 78 BPM | OXYGEN SATURATION: 97 % | WEIGHT: 175 LBS | SYSTOLIC BLOOD PRESSURE: 105 MMHG

## 2025-07-24 VITALS — HEART RATE: 69 BPM

## 2025-07-24 LAB — COLONOSCOPY: NORMAL

## 2025-07-24 PROCEDURE — 88305 TISSUE EXAM BY PATHOLOGIST: CPT | Mod: 26 | Performed by: PATHOLOGY

## 2025-07-24 PROCEDURE — 88305 TISSUE EXAM BY PATHOLOGIST: CPT | Mod: TC | Performed by: INTERNAL MEDICINE

## 2025-07-24 RX ORDER — ONDANSETRON 4 MG/1
4 TABLET, ORALLY DISINTEGRATING ORAL EVERY 6 HOURS PRN
Status: ACTIVE | OUTPATIENT
Start: 2025-07-24

## 2025-07-24 RX ORDER — NALOXONE HYDROCHLORIDE 0.4 MG/ML
0.4 INJECTION, SOLUTION INTRAMUSCULAR; INTRAVENOUS; SUBCUTANEOUS
Status: ACTIVE | OUTPATIENT
Start: 2025-07-24

## 2025-07-24 RX ORDER — FLUMAZENIL 0.1 MG/ML
0.2 INJECTION, SOLUTION INTRAVENOUS
Status: ACTIVE | OUTPATIENT
Start: 2025-07-24 | End: 2025-07-24

## 2025-07-24 RX ORDER — NALOXONE HYDROCHLORIDE 0.4 MG/ML
0.2 INJECTION, SOLUTION INTRAMUSCULAR; INTRAVENOUS; SUBCUTANEOUS
Status: ACTIVE | OUTPATIENT
Start: 2025-07-24

## 2025-07-24 RX ORDER — SODIUM CHLORIDE, SODIUM LACTATE, POTASSIUM CHLORIDE, CALCIUM CHLORIDE 600; 310; 30; 20 MG/100ML; MG/100ML; MG/100ML; MG/100ML
INJECTION, SOLUTION INTRAVENOUS CONTINUOUS PRN
Status: DISCONTINUED | OUTPATIENT
Start: 2025-07-24 | End: 2025-07-24

## 2025-07-24 RX ORDER — ONDANSETRON 2 MG/ML
4 INJECTION INTRAMUSCULAR; INTRAVENOUS EVERY 6 HOURS PRN
Status: ACTIVE | OUTPATIENT
Start: 2025-07-24

## 2025-07-24 RX ORDER — ONDANSETRON 2 MG/ML
4 INJECTION INTRAMUSCULAR; INTRAVENOUS
Status: DISCONTINUED | OUTPATIENT
Start: 2025-07-24 | End: 2025-07-24 | Stop reason: HOSPADM

## 2025-07-24 RX ORDER — PROPOFOL 10 MG/ML
INJECTION, EMULSION INTRAVENOUS CONTINUOUS PRN
Status: DISCONTINUED | OUTPATIENT
Start: 2025-07-24 | End: 2025-07-24

## 2025-07-24 RX ORDER — PROCHLORPERAZINE MALEATE 10 MG
10 TABLET ORAL EVERY 6 HOURS PRN
Status: DISPENSED | OUTPATIENT
Start: 2025-07-24

## 2025-07-24 RX ORDER — PROPOFOL 10 MG/ML
INJECTION, EMULSION INTRAVENOUS PRN
Status: DISCONTINUED | OUTPATIENT
Start: 2025-07-24 | End: 2025-07-24

## 2025-07-24 RX ORDER — LIDOCAINE 40 MG/G
CREAM TOPICAL
Status: DISCONTINUED | OUTPATIENT
Start: 2025-07-24 | End: 2025-07-24 | Stop reason: HOSPADM

## 2025-07-24 RX ORDER — LIDOCAINE HYDROCHLORIDE 20 MG/ML
INJECTION, SOLUTION INFILTRATION; PERINEURAL PRN
Status: DISCONTINUED | OUTPATIENT
Start: 2025-07-24 | End: 2025-07-24

## 2025-07-24 RX ADMIN — PROPOFOL 100 MG: 10 INJECTION, EMULSION INTRAVENOUS at 07:33

## 2025-07-24 RX ADMIN — PROPOFOL 200 MCG/KG/MIN: 10 INJECTION, EMULSION INTRAVENOUS at 07:33

## 2025-07-24 RX ADMIN — SODIUM CHLORIDE, SODIUM LACTATE, POTASSIUM CHLORIDE, CALCIUM CHLORIDE: 600; 310; 30; 20 INJECTION, SOLUTION INTRAVENOUS at 07:28

## 2025-07-24 RX ADMIN — LIDOCAINE HYDROCHLORIDE 60 MG: 20 INJECTION, SOLUTION INFILTRATION; PERINEURAL at 07:33

## 2025-07-24 NOTE — ANESTHESIA POSTPROCEDURE EVALUATION
Patient: Franchesca Hyatt    Procedure: Procedure(s):  COLONOSCOPY, WITH POLYPECTOMY       Anesthesia Type:  No value filed.    Note:  Disposition: Outpatient   Postop Pain Control: Uneventful            Sign Out: Well controlled pain   PONV: No   Neuro/Psych: Uneventful            Sign Out: Acceptable/Baseline neuro status   Airway/Respiratory: Uneventful            Sign Out: Acceptable/Baseline resp. status   CV/Hemodynamics: Uneventful            Sign Out: Acceptable CV status   Other NRE: NONE   DID A NON-ROUTINE EVENT OCCUR? No           Last vitals:  Vitals Value Taken Time   /75 07/24/25 08:14   Temp 36.1  C (97  F) 07/24/25 08:14   Pulse 78 07/24/25 08:00   Resp 16 07/24/25 08:14   SpO2 97 % 07/24/25 08:14       Electronically Signed By: Carlos Hernandez MD  July 24, 2025  4:41 PM

## 2025-07-24 NOTE — ANESTHESIA CARE TRANSFER NOTE
Patient: Franchesca Hyatt    Procedure: Procedure(s):  COLONOSCOPY, WITH POLYPECTOMY       Diagnosis: Special screening for malignant neoplasms, colon [Z12.11]  Diagnosis Additional Information: No value filed.    Anesthesia Type:   No value filed.     Note:    Oropharynx: oropharynx clear of all foreign objects  Level of Consciousness: awake  Oxygen Supplementation: room air    Independent Airway: airway patency satisfactory and stable  Dentition: dentition unchanged  Vital Signs Stable: post-procedure vital signs reviewed and stable  Report to RN Given: handoff report given  Patient transferred to: Phase II    Handoff Report: Identifed the Patient, Identified the Reponsible Provider, Reviewed the pertinent medical history, Discussed the surgical course, Reviewed Intra-OP anesthesia mangement and issues during anesthesia, Set expectations for post-procedure period and Allowed opportunity for questions and acknowledgement of understanding      Vitals:  Vitals Value Taken Time   BP     Temp     Pulse     Resp     SpO2 97 % 07/24/25 07:56   Vitals shown include unfiled device data.    Electronically Signed By: GAVIN Rodriguez CRNA  July 24, 2025  7:58 AM

## 2025-07-24 NOTE — H&P
Franchesca Hyatt  [id number]  female  64 year old      Reason for procedure/surgery: hx of colon polyps, last colon 2019    Patient Active Problem List   Diagnosis    Family history of colonic polyps    Chronic kidney disease, stage 3a (H)    Tubular adenoma of colon    Impaired fasting glucose    Essential hypertension with goal blood pressure less than 140/90    Elevated bilirubin    Seasonal affective disorder    Abnormal weight gain    Obesity, Class I, BMI 30-34.9    Hypercholesteremia    Hyperlipidemia LDL goal <130    Blood type A+    Elevated blood pressure reading without diagnosis of hypertension    ASCUS of cervix with negative high risk HPV    Fx humeral neck, left, closed, initial encounter    Other closed displaced fracture of proximal end of left humerus, initial encounter    Chest pain, unspecified type    Kidney stone       Past Surgical History:    Past Surgical History:   Procedure Laterality Date    BIOPSY BREAST      X 2    COLONOSCOPY  6/15/2009    Polyp - Due in 5 years    CYSTOSCOPY, RETROGRADES, MANIPULATE STONE, INSERT STENT, COMBINED  2007    CYSTOURETEROSCOPY, W/RETROGRD PYELOGRAM, LASER LITHO OF URET CALC W/STRBL ASP, & STENT INSERTION Left 4/7/2025    Procedure: CYSTOSCOPY, LEFT RETROGRADE PYELOGRAM, LEFT URETEROSCOPY WITH LASER LITHOTRIOPSY AND BASKET REMOVAL OF STONES, STEERABLE VACUUM SUCTION LEFT, LEFT URETERAL STENT PLACEMENT;  Surgeon: Winston Van MD;  Location: [facility]    EXCISE PILONIDAL CYST, EXTENSIVE      LASER REVOLIX LITHOTRIPSY URETER(S), INSERT STENT, COMBINED  2007    Sebaceous Cyst Removal  2008    Back       Past Medical History:   Past Medical History:   Diagnosis Date    Calculus, kidney 2007    CKD (chronic kidney disease) stage 3, GFR 30-59 ml/min (H)     Fracture, shoulder 2022    Left    Hypercholesteremia 11/29/2018    Hypertension goal BP (blood pressure) < 140/90 11/05/2012    Seasonal allergic rhinitis     seasonal allergies, cats    Tubular adenoma of colon  10/07/2014    Colonoscopy 10/1/2014, repeat 2019    Urinary tract infection, site not specified  to early     Past history of        Social History:   Social History     Tobacco Use    Smoking status: Former     Current packs/day: 0.00     Types: Cigarettes     Start date: 1986     Quit date: 3/12/1991     Years since quittin.3    Smokeless tobacco: Never    Tobacco comments:     Was a light smoker for a few years in my youth.   Substance Use Topics    Alcohol use: Yes     Comment: rare - one glass per week of wine       Family History:   Family History   Problem Relation Age of Onset    Hypertension Mother     Cerebrovascular Disease Mother         at age 50    Allergies Mother     Arthritis Mother         osteo    Depression Mother         Treated holistically    Cancer - colorectal Mother         BENIGN polyps 67 ()    Hypertension Father     Allergies Father     Depression Father         Took medication    Cerebrovascular Disease Father     Heart Disease Father         had heart surgery and a stroke     Cancer - colorectal Maternal Grandmother         at age 50    Alzheimer Disease Maternal Grandmother         diag at age 80    Eye Disorder Maternal Grandmother         cateracts    Osteoporosis Maternal Grandmother     Cerebrovascular Disease Maternal Grandmother     Colon Cancer Maternal Grandmother     Cancer Maternal Grandfather         smoker    Cancer Paternal Grandfather         smoker    Hypertension Brother     Depression Sister         Holistically    Gynecology Sister         cervical cancer at age 30    Cancer Sister         cervical       Allergies:   Allergies   Allergen Reactions    Latex Hives and Itching    Seasonal Allergies     Vicodin [Hydrocodone-Acetaminophen] Other (See Comments)     Nightmares         Active Medications:   Current Outpatient Medications   Medication Sig Dispense Refill    ACIDOPHILUS OR CAPS 2 caps. daily  0    bisacodyl (DULCOLAX) 5 MG EC tablet  Take 2 tablets at 3 pm the day before your procedure. If your procedure is before 11 am, take 2 additional tablets at 11 pm. If your procedure is after 11 am, take 2 additional tablets at 6 am. For additional instructions refer to your colonoscopy prep instructions. 4 tablet 0    Cyanocobalamin (VITAMIN B 12 PO) Take by mouth daily       DIGEST ENZYMES-ANTICHOLINERGIC OR CAPS       Flaxseed, Linseed, (FLAX SEED OIL PO) Take by mouth daily      hydrOXYzine HCl (ATARAX) 25 MG tablet Take 1/2-2 tabs po HS prn itching 30 tablet 1    lisinopril (ZESTRIL) 10 MG tablet Take 1 tablet (10 mg) by mouth 2 times daily. 180 tablet 3    MAGNESIUM 300 MG OR CAPS At Bedtime       Multiple Vitamins-Minerals (ZINC PO) Take by mouth daily ZINC       Nettle, Urtica Dioica, (NETTLE LEAF PO)       polyethylene glycol (GOLYTELY) 236 g suspension The night before the exam at 6 pm drink an 8-ounce glass every 15 minutes until the jug is half empty. If you arrive before 11 AM: Drink the other half of the Golytely jug at 11 PM night before procedure. If you arrive after 11 AM: Drink the other half of the Golytely jug at 6 AM day of procedure. For additional instructions refer to your colonoscopy prep instructions. 4000 mL 0    VITAMIN D OR 1 cap daily in the winter      docusate sodium (COLACE) 100 MG tablet Take 1 tablet (100 mg) by mouth daily. 60 tablet 1    order for DME Equipment being ordered: SAD lite box 1 each 0    oxyBUTYnin ER (DITROPAN XL) 5 MG 24 hr tablet Take 1 tablet (5 mg) by mouth daily. Take daily until your stent is removed. 10 tablet 2    tamsulosin (FLOMAX) 0.4 MG capsule Take 1 capsule (0.4 mg) by mouth daily. Take daily until your stent is removed. 10 capsule 2       Systemic Review:   CONSTITUTIONAL: NEGATIVE for fever, chills, change in weight  ENT/MOUTH: NEGATIVE for ear, mouth and throat problems  RESP: NEGATIVE for significant cough or SOB  CV: NEGATIVE for chest pain, palpitations or peripheral edema    Physical  "Examination:   Vital Signs: BP (!) 151/101 (BP Location: Right arm)   Pulse 88   Temp 97.6  F (36.4  C) (Temporal)   Ht 1.651 m (5' 5\")   Wt 79.4 kg (175 lb)   LMP 06/08/2014   SpO2 98%   BMI 29.12 kg/m    GENERAL: healthy, alert and no distress  NECK: no adenopathy, no asymmetry, masses, or scars  RESP: lungs clear to auscultation - no rales, rhonchi or wheezes  CV: regular rate and rhythm, normal S1 S2, no S3 or S4, no murmur, click or rub, no peripheral edema and peripheral pulses strong  ABDOMEN: soft, nontender, no hepatosplenomegaly, no masses and bowel sounds normal  MS: no gross musculoskeletal defects noted, no edema    Plan: Appropriate to proceed as scheduled.      Kim Meadows MD  7/24/2025    PCP:  Monica Lemus  "

## 2025-07-24 NOTE — ANESTHESIA PREPROCEDURE EVALUATION
Anesthesia Pre-Procedure Evaluation    Patient: Franchesca Hyatt   MRN: [id number] : 1960          Procedure : Procedure(s):  COLONOSCOPY, WITH POLYPECTOMY         Past Medical History:   Diagnosis Date    Calculus, kidney     CKD (chronic kidney disease) stage 3, GFR 30-59 ml/min (H)     Fracture, shoulder     Left    Hypercholesteremia 2018    Hypertension goal BP (blood pressure) < 140/90 2012    Seasonal allergic rhinitis     seasonal allergies, cats    Tubular adenoma of colon 10/07/2014    Colonoscopy 10/1/2014, repeat 2019    Urinary tract infection, site not specified  to early     Past history of       Past Surgical History:   Procedure Laterality Date    BIOPSY BREAST      X 2    COLONOSCOPY  6/15/2009    Polyp - Due in 5 years    CYSTOSCOPY, RETROGRADES, MANIPULATE STONE, INSERT STENT, COMBINED      CYSTOURETEROSCOPY, W/RETROGRD PYELOGRAM, LASER LITHO OF URET CALC W/STRBL ASP, & STENT INSERTION Left 2025    Procedure: CYSTOSCOPY, LEFT RETROGRADE PYELOGRAM, LEFT URETEROSCOPY WITH LASER LITHOTRIOPSY AND BASKET REMOVAL OF STONES, STEERABLE VACUUM SUCTION LEFT, LEFT URETERAL STENT PLACEMENT;  Surgeon: Winston Van MD;  Location: [facility]    EXCISE PILONIDAL CYST, EXTENSIVE      LASER REVOLIX LITHOTRIPSY URETER(S), INSERT STENT, COMBINED      Sebaceous Cyst Removal      Back      Allergies   Allergen Reactions    Latex Hives and Itching    Seasonal Allergies     Vicodin [Hydrocodone-Acetaminophen] Other (See Comments)     Nightmares        Social History     Tobacco Use    Smoking status: Former     Current packs/day: 0.00     Types: Cigarettes     Start date: 1986     Quit date: 3/12/1991     Years since quittin.3    Smokeless tobacco: Never    Tobacco comments:     Was a light smoker for a few years in my youth.   Substance Use Topics    Alcohol use: Yes     Comment: rare - one glass per week of wine      Wt Readings from Last 1 Encounters:    07/24/25 79.4 kg (175 lb)        Anesthesia Evaluation            ROS/MED HX  ENT/Pulmonary:       Neurologic:       Cardiovascular:     (+) Dyslipidemia hypertension- -   -  - -                                      METS/Exercise Tolerance:     Hematologic:       Musculoskeletal:       GI/Hepatic:       Renal/Genitourinary:     (+) renal disease, type: CRI,            Endo:       Psychiatric/Substance Use:       Infectious Disease:       Malignancy:       Other:              Physical Exam  Airway  Mallampati: II  TM distance: >3 FB  Neck ROM: full  Mouth opening: >= 4 cm    Cardiovascular   Rhythm: regular  Rate: normal rate     Dental   (+) Completely normal teeth      Pulmonary Breath sounds clear to auscultation        Neurological   She appears awake, alert and oriented x3.    Other Findings       OUTSIDE LABS:  CBC:   Lab Results   Component Value Date    WBC 6.4 03/24/2025    WBC 4.5 01/18/2024    HGB 15.0 03/24/2025    HGB 14.0 01/18/2024    HCT 45.5 03/24/2025    HCT 42.8 01/18/2024     03/24/2025     01/18/2024     BMP:   Lab Results   Component Value Date     03/24/2025     01/18/2024    POTASSIUM 3.8 03/24/2025    POTASSIUM 4.0 01/18/2024    CHLORIDE 101 03/24/2025    CHLORIDE 103 01/18/2024    CO2 22 03/24/2025    CO2 26 01/18/2024    BUN 18.0 03/24/2025    BUN 14.3 01/18/2024    CR 0.96 (H) 03/24/2025    CR 1.04 (H) 01/18/2024     (H) 03/24/2025     (H) 01/18/2024     COAGS:   Lab Results   Component Value Date    PTT 28 07/13/2007    INR 1.02 03/28/2012     POC:   Lab Results   Component Value Date    HCG negative 03/28/2012     HEPATIC:   Lab Results   Component Value Date    ALBUMIN 4.7 03/24/2025    PROTTOTAL 7.1 03/24/2025    ALT 20 03/24/2025    AST 18 03/24/2025    ALKPHOS 57 03/24/2025    BILITOTAL 1.7 (H) 03/24/2025     OTHER:   Lab Results   Component Value Date    A1C 6.1 (H) 03/24/2025    JUSTO 9.7 03/24/2025    PHOS 2.9 08/09/2011    MAG 2.1  "06/21/2007    LIPASE 148 03/10/2022    TSH 1.45 08/23/2023       Anesthesia Plan    ASA Status:  2      NPO Status: NPO Appropriate   Anesthesia Type: MAC.  Airway: natural airway.  Induction: intravenous.  Maintenance: TIVA.   Techniques and Equipment:       - Monitoring Plan: standard ASA monitoring     Consents    Anesthesia Plan(s) and associated risks, benefits, and realistic alternatives discussed. Questions answered and patient/representative(s) expressed understanding.     - Discussed:     - Discussed with:  Patient        - Pt is DNR/DNI Status: no DNR     Blood Consent:      - Discussed with: not discussed.     Postoperative Care    Pain management: non-narcotic analgesics, plan for postoperative opioid use.     Comments:                   Carlos Hernandez MD    I have reviewed the pertinent notes and labs in the chart from the past 30 days and (re)examined the patient.  Any updates or changes from those notes are reflected in this note.    Clinically Significant Risk Factors Present on Admission                   # Hypertension: Noted on problem list           # Overweight: Estimated body mass index is 29.12 kg/m  as calculated from the following:    Height as of this encounter: 1.651 m (5' 5\").    Weight as of this encounter: 79.4 kg (175 lb).                    "

## 2025-08-05 ENCOUNTER — OFFICE VISIT (OUTPATIENT)
Dept: FAMILY MEDICINE | Facility: CLINIC | Age: 65
End: 2025-08-05
Payer: COMMERCIAL

## 2025-08-05 VITALS
OXYGEN SATURATION: 97 % | WEIGHT: 178.5 LBS | TEMPERATURE: 98.1 F | HEART RATE: 73 BPM | RESPIRATION RATE: 16 BRPM | SYSTOLIC BLOOD PRESSURE: 137 MMHG | DIASTOLIC BLOOD PRESSURE: 87 MMHG | HEIGHT: 64 IN | BODY MASS INDEX: 30.48 KG/M2

## 2025-08-05 DIAGNOSIS — Z01.818 PREOP GENERAL PHYSICAL EXAM: Primary | ICD-10-CM

## 2025-08-05 DIAGNOSIS — E78.5 HYPERLIPIDEMIA LDL GOAL <130: ICD-10-CM

## 2025-08-05 DIAGNOSIS — N20.0 KIDNEY STONE: ICD-10-CM

## 2025-08-05 DIAGNOSIS — I10 ESSENTIAL HYPERTENSION WITH GOAL BLOOD PRESSURE LESS THAN 140/90: ICD-10-CM

## 2025-08-05 DIAGNOSIS — N18.31 CHRONIC KIDNEY DISEASE, STAGE 3A (H): ICD-10-CM

## 2025-08-05 PROBLEM — R07.9 CHEST PAIN, UNSPECIFIED TYPE: Status: RESOLVED | Noted: 2023-08-23 | Resolved: 2025-08-05

## 2025-08-05 PROBLEM — R03.0 ELEVATED BLOOD PRESSURE READING WITHOUT DIAGNOSIS OF HYPERTENSION: Status: RESOLVED | Noted: 2021-04-01 | Resolved: 2025-08-05

## 2025-08-05 LAB
ALBUMIN UR-MCNC: NEGATIVE MG/DL
ANION GAP SERPL CALCULATED.3IONS-SCNC: 9 MMOL/L (ref 7–15)
APPEARANCE UR: CLEAR
BACTERIA #/AREA URNS HPF: ABNORMAL /HPF
BILIRUB UR QL STRIP: NEGATIVE
BUN SERPL-MCNC: 16.9 MG/DL (ref 8–23)
CALCIUM SERPL-MCNC: 9.5 MG/DL (ref 8.8–10.4)
CHLORIDE SERPL-SCNC: 103 MMOL/L (ref 98–107)
COLOR UR AUTO: YELLOW
CREAT SERPL-MCNC: 1.08 MG/DL (ref 0.51–0.95)
EGFRCR SERPLBLD CKD-EPI 2021: 57 ML/MIN/1.73M2
ERYTHROCYTE [DISTWIDTH] IN BLOOD BY AUTOMATED COUNT: 12.1 % (ref 10–15)
GLUCOSE SERPL-MCNC: 124 MG/DL (ref 70–99)
GLUCOSE UR STRIP-MCNC: NEGATIVE MG/DL
HCO3 SERPL-SCNC: 27 MMOL/L (ref 22–29)
HCT VFR BLD AUTO: 44.5 % (ref 35–47)
HGB BLD-MCNC: 14.5 G/DL (ref 11.7–15.7)
HGB UR QL STRIP: ABNORMAL
KETONES UR STRIP-MCNC: NEGATIVE MG/DL
LEUKOCYTE ESTERASE UR QL STRIP: ABNORMAL
MCH RBC QN AUTO: 30.7 PG (ref 26.5–33)
MCHC RBC AUTO-ENTMCNC: 32.6 G/DL (ref 31.5–36.5)
MCV RBC AUTO: 94 FL (ref 78–100)
NITRATE UR QL: NEGATIVE
PH UR STRIP: 6 [PH] (ref 5–7)
PLATELET # BLD AUTO: 251 10E3/UL (ref 150–450)
POTASSIUM SERPL-SCNC: 3.9 MMOL/L (ref 3.4–5.3)
RBC # BLD AUTO: 4.73 10E6/UL (ref 3.8–5.2)
RBC #/AREA URNS AUTO: ABNORMAL /HPF
SODIUM SERPL-SCNC: 139 MMOL/L (ref 135–145)
SP GR UR STRIP: <=1.005 (ref 1–1.03)
UROBILINOGEN UR STRIP-ACNC: 0.2 E.U./DL
WBC # BLD AUTO: 4.8 10E3/UL (ref 4–11)
WBC #/AREA URNS AUTO: ABNORMAL /HPF

## 2025-08-05 PROCEDURE — 81001 URINALYSIS AUTO W/SCOPE: CPT

## 2025-08-05 PROCEDURE — 3075F SYST BP GE 130 - 139MM HG: CPT

## 2025-08-05 PROCEDURE — 99214 OFFICE O/P EST MOD 30 MIN: CPT

## 2025-08-05 PROCEDURE — 85027 COMPLETE CBC AUTOMATED: CPT

## 2025-08-05 PROCEDURE — 3079F DIAST BP 80-89 MM HG: CPT

## 2025-08-05 PROCEDURE — 87086 URINE CULTURE/COLONY COUNT: CPT

## 2025-08-05 PROCEDURE — 36415 COLL VENOUS BLD VENIPUNCTURE: CPT

## 2025-08-05 PROCEDURE — 80048 BASIC METABOLIC PNL TOTAL CA: CPT

## 2025-08-06 LAB — BACTERIA UR CULT: NORMAL

## 2025-08-18 ENCOUNTER — APPOINTMENT (OUTPATIENT)
Dept: GENERAL RADIOLOGY | Facility: CLINIC | Age: 65
End: 2025-08-18
Attending: UROLOGY
Payer: COMMERCIAL

## 2025-08-18 ENCOUNTER — HOSPITAL ENCOUNTER (OUTPATIENT)
Facility: CLINIC | Age: 65
Discharge: HOME | End: 2025-08-18
Attending: UROLOGY | Admitting: UROLOGY
Payer: COMMERCIAL

## 2025-08-18 ENCOUNTER — ANESTHESIA EVENT (OUTPATIENT)
Dept: SURGERY | Facility: CLINIC | Age: 65
End: 2025-08-18
Payer: COMMERCIAL

## 2025-08-18 ENCOUNTER — ANESTHESIA (OUTPATIENT)
Dept: SURGERY | Facility: CLINIC | Age: 65
End: 2025-08-18
Payer: COMMERCIAL

## 2025-08-18 VITALS
SYSTOLIC BLOOD PRESSURE: 134 MMHG | TEMPERATURE: 96.6 F | DIASTOLIC BLOOD PRESSURE: 89 MMHG | BODY MASS INDEX: 29.61 KG/M2 | HEART RATE: 60 BPM | RESPIRATION RATE: 15 BRPM | HEIGHT: 65 IN | WEIGHT: 177.7 LBS | OXYGEN SATURATION: 97 %

## 2025-08-18 DIAGNOSIS — N20.0 KIDNEY STONE ON LEFT SIDE: Primary | ICD-10-CM

## 2025-08-18 PROCEDURE — 360N000077 HC SURGERY LEVEL 4, PER MIN: Performed by: UROLOGY

## 2025-08-18 PROCEDURE — C1894 INTRO/SHEATH, NON-LASER: HCPCS | Performed by: UROLOGY

## 2025-08-18 PROCEDURE — 370N000017 HC ANESTHESIA TECHNICAL FEE, PER MIN: Performed by: UROLOGY

## 2025-08-18 PROCEDURE — 250N000013 HC RX MED GY IP 250 OP 250 PS 637: Performed by: STUDENT IN AN ORGANIZED HEALTH CARE EDUCATION/TRAINING PROGRAM

## 2025-08-18 PROCEDURE — 272N000001 HC OR GENERAL SUPPLY STERILE: Performed by: UROLOGY

## 2025-08-18 PROCEDURE — 999N000141 HC STATISTIC PRE-PROCEDURE NURSING ASSESSMENT: Performed by: UROLOGY

## 2025-08-18 PROCEDURE — C1769 GUIDE WIRE: HCPCS | Performed by: UROLOGY

## 2025-08-18 PROCEDURE — 52353 CYSTOURETERO W/LITHOTRIPSY: CPT | Mod: LT | Performed by: UROLOGY

## 2025-08-18 PROCEDURE — 272N000002 HC OR SUPPLY OTHER OPNP: Performed by: UROLOGY

## 2025-08-18 PROCEDURE — 74420 UROGRAPHY RTRGR +-KUB: CPT | Mod: 26 | Performed by: UROLOGY

## 2025-08-18 PROCEDURE — 250N000011 HC RX IP 250 OP 636: Performed by: NURSE ANESTHETIST, CERTIFIED REGISTERED

## 2025-08-18 PROCEDURE — 250N000009 HC RX 250: Performed by: UROLOGY

## 2025-08-18 PROCEDURE — 250N000013 HC RX MED GY IP 250 OP 250 PS 637: Performed by: UROLOGY

## 2025-08-18 PROCEDURE — 258N000001 HC RX 258: Performed by: UROLOGY

## 2025-08-18 PROCEDURE — 250N000011 HC RX IP 250 OP 636: Performed by: UROLOGY

## 2025-08-18 PROCEDURE — 250N000009 HC RX 250: Performed by: STUDENT IN AN ORGANIZED HEALTH CARE EDUCATION/TRAINING PROGRAM

## 2025-08-18 PROCEDURE — 82365 CALCULUS SPECTROSCOPY: CPT | Performed by: UROLOGY

## 2025-08-18 PROCEDURE — 710N000012 HC RECOVERY PHASE 2, PER MINUTE: Performed by: UROLOGY

## 2025-08-18 PROCEDURE — 999N000179 XR SURGERY CARM FLUORO LESS THAN 5 MIN W STILLS

## 2025-08-18 PROCEDURE — 258N000003 HC RX IP 258 OP 636: Performed by: NURSE ANESTHETIST, CERTIFIED REGISTERED

## 2025-08-18 PROCEDURE — 710N000009 HC RECOVERY PHASE 1, LEVEL 1, PER MIN: Performed by: UROLOGY

## 2025-08-18 PROCEDURE — 250N000009 HC RX 250: Performed by: NURSE ANESTHETIST, CERTIFIED REGISTERED

## 2025-08-18 RX ORDER — ONDANSETRON 2 MG/ML
4 INJECTION INTRAMUSCULAR; INTRAVENOUS EVERY 30 MIN PRN
Status: DISCONTINUED | OUTPATIENT
Start: 2025-08-18 | End: 2025-08-18 | Stop reason: HOSPADM

## 2025-08-18 RX ORDER — ACETAMINOPHEN 650 MG/1
650 SUPPOSITORY RECTAL ONCE
Status: COMPLETED | OUTPATIENT
Start: 2025-08-18 | End: 2025-08-18

## 2025-08-18 RX ORDER — PROPOFOL 10 MG/ML
INJECTION, EMULSION INTRAVENOUS CONTINUOUS PRN
Status: DISCONTINUED | OUTPATIENT
Start: 2025-08-18 | End: 2025-08-18

## 2025-08-18 RX ORDER — OXYCODONE HYDROCHLORIDE 5 MG/1
5 TABLET ORAL EVERY 6 HOURS PRN
Qty: 5 TABLET | Refills: 0 | Status: SHIPPED | OUTPATIENT
Start: 2025-08-18 | End: 2025-08-21

## 2025-08-18 RX ORDER — MAGNESIUM HYDROXIDE 1200 MG/15ML
LIQUID ORAL PRN
Status: DISCONTINUED | OUTPATIENT
Start: 2025-08-18 | End: 2025-08-18 | Stop reason: HOSPADM

## 2025-08-18 RX ORDER — IOPAMIDOL 612 MG/ML
INJECTION, SOLUTION INTRATHECAL PRN
Status: DISCONTINUED | OUTPATIENT
Start: 2025-08-18 | End: 2025-08-18 | Stop reason: HOSPADM

## 2025-08-18 RX ORDER — ONDANSETRON 2 MG/ML
INJECTION INTRAMUSCULAR; INTRAVENOUS PRN
Status: DISCONTINUED | OUTPATIENT
Start: 2025-08-18 | End: 2025-08-18

## 2025-08-18 RX ORDER — DEXAMETHASONE SODIUM PHOSPHATE 4 MG/ML
INJECTION, SOLUTION INTRA-ARTICULAR; INTRALESIONAL; INTRAMUSCULAR; INTRAVENOUS; SOFT TISSUE PRN
Status: DISCONTINUED | OUTPATIENT
Start: 2025-08-18 | End: 2025-08-18

## 2025-08-18 RX ORDER — LIDOCAINE HYDROCHLORIDE 20 MG/ML
INJECTION, SOLUTION INFILTRATION; PERINEURAL PRN
Status: DISCONTINUED | OUTPATIENT
Start: 2025-08-18 | End: 2025-08-18

## 2025-08-18 RX ORDER — KETOROLAC TROMETHAMINE 10 MG/1
10 TABLET, FILM COATED ORAL EVERY 6 HOURS
Qty: 20 TABLET | Refills: 0 | Status: SHIPPED | OUTPATIENT
Start: 2025-08-18 | End: 2025-08-23

## 2025-08-18 RX ORDER — HYDROMORPHONE HCL IN WATER/PF 6 MG/30 ML
0.2 PATIENT CONTROLLED ANALGESIA SYRINGE INTRAVENOUS EVERY 5 MIN PRN
Status: DISCONTINUED | OUTPATIENT
Start: 2025-08-18 | End: 2025-08-18 | Stop reason: HOSPADM

## 2025-08-18 RX ORDER — FENTANYL CITRATE 50 UG/ML
INJECTION, SOLUTION INTRAMUSCULAR; INTRAVENOUS PRN
Status: DISCONTINUED | OUTPATIENT
Start: 2025-08-18 | End: 2025-08-18

## 2025-08-18 RX ORDER — ONDANSETRON 4 MG/1
4 TABLET, ORALLY DISINTEGRATING ORAL EVERY 30 MIN PRN
Status: DISCONTINUED | OUTPATIENT
Start: 2025-08-18 | End: 2025-08-18 | Stop reason: HOSPADM

## 2025-08-18 RX ORDER — HYDROMORPHONE HCL IN WATER/PF 6 MG/30 ML
0.4 PATIENT CONTROLLED ANALGESIA SYRINGE INTRAVENOUS EVERY 5 MIN PRN
Status: DISCONTINUED | OUTPATIENT
Start: 2025-08-18 | End: 2025-08-18 | Stop reason: HOSPADM

## 2025-08-18 RX ORDER — DEXAMETHASONE SODIUM PHOSPHATE 4 MG/ML
4 INJECTION, SOLUTION INTRA-ARTICULAR; INTRALESIONAL; INTRAMUSCULAR; INTRAVENOUS; SOFT TISSUE
Status: DISCONTINUED | OUTPATIENT
Start: 2025-08-18 | End: 2025-08-18 | Stop reason: HOSPADM

## 2025-08-18 RX ORDER — FENTANYL CITRATE 50 UG/ML
50 INJECTION, SOLUTION INTRAMUSCULAR; INTRAVENOUS EVERY 5 MIN PRN
Status: DISCONTINUED | OUTPATIENT
Start: 2025-08-18 | End: 2025-08-18 | Stop reason: HOSPADM

## 2025-08-18 RX ORDER — PROPOFOL 10 MG/ML
INJECTION, EMULSION INTRAVENOUS PRN
Status: DISCONTINUED | OUTPATIENT
Start: 2025-08-18 | End: 2025-08-18

## 2025-08-18 RX ORDER — NALOXONE HYDROCHLORIDE 0.4 MG/ML
0.1 INJECTION, SOLUTION INTRAMUSCULAR; INTRAVENOUS; SUBCUTANEOUS
Status: DISCONTINUED | OUTPATIENT
Start: 2025-08-18 | End: 2025-08-18 | Stop reason: HOSPADM

## 2025-08-18 RX ORDER — OXYCODONE HYDROCHLORIDE 5 MG/1
5 TABLET ORAL
Refills: 0 | Status: COMPLETED | OUTPATIENT
Start: 2025-08-18 | End: 2025-08-18

## 2025-08-18 RX ORDER — CEFAZOLIN SODIUM/WATER 2 G/20 ML
2 SYRINGE (ML) INTRAVENOUS SEE ADMIN INSTRUCTIONS
Status: DISCONTINUED | OUTPATIENT
Start: 2025-08-18 | End: 2025-08-18 | Stop reason: HOSPADM

## 2025-08-18 RX ORDER — SODIUM CHLORIDE, SODIUM LACTATE, POTASSIUM CHLORIDE, CALCIUM CHLORIDE 600; 310; 30; 20 MG/100ML; MG/100ML; MG/100ML; MG/100ML
INJECTION, SOLUTION INTRAVENOUS CONTINUOUS PRN
Status: DISCONTINUED | OUTPATIENT
Start: 2025-08-18 | End: 2025-08-18

## 2025-08-18 RX ORDER — FENTANYL CITRATE 50 UG/ML
25 INJECTION, SOLUTION INTRAMUSCULAR; INTRAVENOUS EVERY 5 MIN PRN
Status: DISCONTINUED | OUTPATIENT
Start: 2025-08-18 | End: 2025-08-18 | Stop reason: HOSPADM

## 2025-08-18 RX ORDER — SODIUM CHLORIDE, SODIUM LACTATE, POTASSIUM CHLORIDE, CALCIUM CHLORIDE 600; 310; 30; 20 MG/100ML; MG/100ML; MG/100ML; MG/100ML
INJECTION, SOLUTION INTRAVENOUS CONTINUOUS
Status: DISCONTINUED | OUTPATIENT
Start: 2025-08-18 | End: 2025-08-18 | Stop reason: HOSPADM

## 2025-08-18 RX ORDER — CEFAZOLIN SODIUM/WATER 2 G/20 ML
2 SYRINGE (ML) INTRAVENOUS
Status: COMPLETED | OUTPATIENT
Start: 2025-08-18 | End: 2025-08-18

## 2025-08-18 RX ORDER — ACETAMINOPHEN 325 MG/1
975 TABLET ORAL ONCE
Status: COMPLETED | OUTPATIENT
Start: 2025-08-18 | End: 2025-08-18

## 2025-08-18 RX ORDER — KETOROLAC TROMETHAMINE 30 MG/ML
INJECTION, SOLUTION INTRAMUSCULAR; INTRAVENOUS PRN
Status: DISCONTINUED | OUTPATIENT
Start: 2025-08-18 | End: 2025-08-18

## 2025-08-18 RX ORDER — SCOPOLAMINE 1 MG/3D
1 PATCH, EXTENDED RELEASE TRANSDERMAL
Status: DISCONTINUED | OUTPATIENT
Start: 2025-08-18 | End: 2025-08-18 | Stop reason: HOSPADM

## 2025-08-18 RX ADMIN — PHENYLEPHRINE HYDROCHLORIDE 100 MCG: 10 INJECTION INTRAVENOUS at 12:22

## 2025-08-18 RX ADMIN — PROPOFOL 30 MG: 10 INJECTION, EMULSION INTRAVENOUS at 12:20

## 2025-08-18 RX ADMIN — KETOROLAC TROMETHAMINE 30 MG: 30 INJECTION, SOLUTION INTRAMUSCULAR at 12:56

## 2025-08-18 RX ADMIN — FENTANYL CITRATE 50 MCG: 50 INJECTION INTRAMUSCULAR; INTRAVENOUS at 12:13

## 2025-08-18 RX ADMIN — OXYCODONE HYDROCHLORIDE 5 MG: 5 TABLET ORAL at 13:45

## 2025-08-18 RX ADMIN — ACETAMINOPHEN 975 MG: 325 TABLET ORAL at 11:12

## 2025-08-18 RX ADMIN — LIDOCAINE HYDROCHLORIDE 100 MG: 20 INJECTION, SOLUTION INFILTRATION; PERINEURAL at 12:13

## 2025-08-18 RX ADMIN — ONDANSETRON 4 MG: 2 INJECTION INTRAMUSCULAR; INTRAVENOUS at 12:13

## 2025-08-18 RX ADMIN — PHENYLEPHRINE HYDROCHLORIDE 100 MCG: 10 INJECTION INTRAVENOUS at 12:30

## 2025-08-18 RX ADMIN — FENTANYL CITRATE 50 MCG: 50 INJECTION INTRAMUSCULAR; INTRAVENOUS at 12:20

## 2025-08-18 RX ADMIN — SODIUM CHLORIDE, SODIUM LACTATE, POTASSIUM CHLORIDE, AND CALCIUM CHLORIDE: .6; .31; .03; .02 INJECTION, SOLUTION INTRAVENOUS at 12:04

## 2025-08-18 RX ADMIN — MIDAZOLAM 2 MG: 1 INJECTION INTRAMUSCULAR; INTRAVENOUS at 12:11

## 2025-08-18 RX ADMIN — Medication 2 G: at 12:09

## 2025-08-18 RX ADMIN — PROPOFOL 150 MCG/KG/MIN: 10 INJECTION, EMULSION INTRAVENOUS at 12:13

## 2025-08-18 RX ADMIN — DEXAMETHASONE SODIUM PHOSPHATE 4 MG: 4 INJECTION, SOLUTION INTRA-ARTICULAR; INTRALESIONAL; INTRAMUSCULAR; INTRAVENOUS; SOFT TISSUE at 12:13

## 2025-08-18 RX ADMIN — PROPOFOL 200 MG: 10 INJECTION, EMULSION INTRAVENOUS at 12:13

## 2025-08-18 RX ADMIN — PHENYLEPHRINE HYDROCHLORIDE 100 MCG: 10 INJECTION INTRAVENOUS at 12:41

## 2025-08-18 RX ADMIN — SCOPOLAMINE 1 PATCH: 1.5 PATCH, EXTENDED RELEASE TRANSDERMAL at 11:46

## 2025-08-18 ASSESSMENT — ACTIVITIES OF DAILY LIVING (ADL)
ADLS_ACUITY_SCORE: 46

## (undated) DEVICE — ENDO ADAPTER CHECK-FLO DISP 27550-CKU

## (undated) DEVICE — SUCTION MANIFOLD NEPTUNE 2 SYS 4 PORT 0702-020-000

## (undated) DEVICE — HOLMIUM LASER TECH

## (undated) DEVICE — PACK TUR CUSTOM SBA15RUFSE

## (undated) DEVICE — SYR 20ML LL W/O NDL 302830

## (undated) DEVICE — SHEATH URETERAL ACCESS NAVIGATOR HD 12/14FRX36CM M0062502250

## (undated) DEVICE — TUBING SET THERMEDX UROLOGY SGL USE LL0006

## (undated) DEVICE — GUIDEWIRE SENSOR DUAL FLEX STR 0.035"X150CM M0066703080

## (undated) DEVICE — KIT TURNOVER FAIRVIEW SOUTHDALE HALF SP3890

## (undated) DEVICE — DRAPE COVER C-ARM SEAMLESS SNAP-KAP 03-KP26 LATEX FREE

## (undated) DEVICE — BAG DRAIN URO FOR SIEMENS 8MM ADAPTER NS CC164NS-A

## (undated) DEVICE — GUIDEWIRE AMPLATZ SUPER STIFF .035 X 145CM M0066401080

## (undated) DEVICE — ENDO SEAL BX PORT BPS-A

## (undated) DEVICE — SOLUTION IV IRRIGATION 0.9% NACL 3L R8206

## (undated) DEVICE — TUBING SUCTION MEDI-VAC SOFT 3/16"X20' N520A

## (undated) DEVICE — PAD CHUX UNDERPAD 23X24" 7136

## (undated) DEVICE — BASKET STONE RETRIEVAL NTNL ZERO TIP 1.9FRX90CM M0063901050

## (undated) DEVICE — RAD RX ISOVUE 300 (50ML) 61% IOPAMIDOL CHARGE PER ML

## (undated) DEVICE — Device

## (undated) DEVICE — SHEATH URETERAL ACCESS NAVIGATOR HD 11/13FRX36CM M0062502220

## (undated) DEVICE — LINEN TOWEL PACK X5 5464

## (undated) DEVICE — DRSG TELFA 3X8" 1238

## (undated) DEVICE — HOLMIUM LASER

## (undated) DEVICE — LASER FIBER HOLMIUM MOSES 200 D/F/L AC-10030100

## (undated) DEVICE — CVAC ASPIRATION SYSTEM CVC127020-1

## (undated) RX ORDER — OXYCODONE HYDROCHLORIDE 5 MG/1
TABLET ORAL
Status: DISPENSED
Start: 2025-08-18

## (undated) RX ORDER — ACETAMINOPHEN 325 MG/1
TABLET ORAL
Status: DISPENSED
Start: 2025-08-18

## (undated) RX ORDER — PROPOFOL 10 MG/ML
INJECTION, EMULSION INTRAVENOUS
Status: DISPENSED
Start: 2025-08-18

## (undated) RX ORDER — KETOROLAC TROMETHAMINE 30 MG/ML
INJECTION, SOLUTION INTRAMUSCULAR; INTRAVENOUS
Status: DISPENSED
Start: 2025-04-07

## (undated) RX ORDER — APREPITANT 40 MG/1
CAPSULE ORAL
Status: DISPENSED
Start: 2025-04-07

## (undated) RX ORDER — SCOPOLAMINE 1 MG/3D
PATCH, EXTENDED RELEASE TRANSDERMAL
Status: DISPENSED
Start: 2025-08-18

## (undated) RX ORDER — METOPROLOL TARTRATE 1 MG/ML
INJECTION, SOLUTION INTRAVENOUS
Status: DISPENSED
Start: 2023-08-24

## (undated) RX ORDER — ACETAMINOPHEN 325 MG/1
TABLET ORAL
Status: DISPENSED
Start: 2025-04-07

## (undated) RX ORDER — SCOPOLAMINE 1 MG/3D
PATCH, EXTENDED RELEASE TRANSDERMAL
Status: DISPENSED
Start: 2025-04-07

## (undated) RX ORDER — ATROPINE SULFATE 0.4 MG/ML
AMPUL (ML) INJECTION
Status: DISPENSED
Start: 2023-08-24

## (undated) RX ORDER — DOBUTAMINE HYDROCHLORIDE 200 MG/100ML
INJECTION INTRAVENOUS
Status: DISPENSED
Start: 2023-08-24

## (undated) RX ORDER — DEXAMETHASONE SODIUM PHOSPHATE 4 MG/ML
INJECTION, SOLUTION INTRA-ARTICULAR; INTRALESIONAL; INTRAMUSCULAR; INTRAVENOUS; SOFT TISSUE
Status: DISPENSED
Start: 2025-08-18

## (undated) RX ORDER — FENTANYL CITRATE 50 UG/ML
INJECTION, SOLUTION INTRAMUSCULAR; INTRAVENOUS
Status: DISPENSED
Start: 2025-08-18

## (undated) RX ORDER — ONDANSETRON 2 MG/ML
INJECTION INTRAMUSCULAR; INTRAVENOUS
Status: DISPENSED
Start: 2025-04-07

## (undated) RX ORDER — ONDANSETRON 2 MG/ML
INJECTION INTRAMUSCULAR; INTRAVENOUS
Status: DISPENSED
Start: 2025-08-18

## (undated) RX ORDER — FUROSEMIDE 10 MG/ML
INJECTION INTRAMUSCULAR; INTRAVENOUS
Status: DISPENSED
Start: 2025-04-07

## (undated) RX ORDER — FENTANYL CITRATE 50 UG/ML
INJECTION, SOLUTION INTRAMUSCULAR; INTRAVENOUS
Status: DISPENSED
Start: 2025-04-07

## (undated) RX ORDER — KETOROLAC TROMETHAMINE 30 MG/ML
INJECTION, SOLUTION INTRAMUSCULAR; INTRAVENOUS
Status: DISPENSED
Start: 2025-08-18